# Patient Record
Sex: FEMALE | Race: WHITE
[De-identification: names, ages, dates, MRNs, and addresses within clinical notes are randomized per-mention and may not be internally consistent; named-entity substitution may affect disease eponyms.]

---

## 2017-06-06 NOTE — PCM.HP
H&P History of Present Illness





- General


Date of Service: 06/06/17


Source of Information: Provider


History Limitations: Reports: No Limitations





- History of Present Illness


Initial Comments - Free Text/Narative: 











84 year old SN[facility] resident apparently has had at least three episodes of N/V, can 

not exclude hematemesis.  No apparent fever or chills were noted;  appears to 

have associated with abdominal discomfort.  Information obtained using EMR, 

patient could not provide additional information.  There has been a lack of 

appetite.  A change in mental status with increasing lethargy.  Daughter of the 

patient requested minimal approach with treatment of UTI and dehydration for 

now.  Does not want a general surgery consult for now re: GI bleed.


Onset of Symptoms: Reports: Sudden


Symptom Onset Date: 06/06/17


Duration of Symptoms: Reports: Hour(s):, Getting Worse


Location: Reports: Abdomen


Severity: Moderate


Improves with: Reports: None


Worsens with: Reports: None


Associated Symptoms: Reports: Loss of Appetite, Nausea/Vomiting, Weakness





- Related Data


Allergies/Adverse Reactions: 


 Allergies











Allergy/AdvReac Type Severity Reaction Status Date / Time


 


hydrocodone Allergy  Vomiting Verified 04/25/14 08:33


 


meperidine [From Demerol] Allergy  Cannot Verified 06/06/17 15:11





   Remember  


 


tramadol Allergy  Dizziness Verified 04/25/14 08:33











Home Medications: 


 Home Meds





Acetaminophen [Tylenol Extra Strength] 500 mg PO Q4H PRN 04/25/14 [History]


Ascorbic Acid [Vitamin C] 500 mg PO DAILY 04/25/14 [History]


B2/Vit A,C & E/Lut/Zeaxanth/Mn [Icaps] 1 each PO DAILY 04/25/14 [History]


Cholecalciferol (Vitamin D3) [Vitamin D3] 1,000 units PO DAILY 04/25/14 [History

]


Cyanocobalamin (Vitamin B12) [Vitamin B12] 1,000 mcg PO DAILY 04/25/14 [History]


Ferrous Sulfate [Ferosul] 325 mg PO BID 04/25/14 [History]


Gabapentin 1 tab PO DAILY 04/25/14 [History]


Propranolol [Inderal] 10 mg PO BID 04/25/14 [History]


Citalopram Hydrobromide [Celexa] 20 mg PO BEDTIME 08/22/14 [History]


Acetaminophen [Tylenol] 650 mg PO BID 06/06/17 [History]


Memantine HCl [Namenda XR] 14 mg PO DAILY 06/06/17 [History]


Ondansetron [Zofran ODT] 4 mg PO Q4H PRN 06/06/17 [History]


Polyethylene Glycol 3350 [Miralax] 17 gram PO DAILY 06/06/17 [History]


Ranitidine HCl [Zantac] 150 mg PO DAILY 06/06/17 [History]


Sennosides/Docusate Sodium [Senna-Docusate Sodium] 8.6 - 50 mg PO BID 06/06/17 [

History]











Past Medical History


HEENT History: Reports: Macular Degeneration


Cardiovascular History: Reports: Hypertension


Gastrointestinal History: Reports: GERD


Genitourinary History: Reports: Chronic Renal Insuffiency


Psychiatric History: Reports: Anxiety, Dementia, Depression, Other (See Below)


Other Psychiatric History: no behavioral disturbances.


Hematologic History: Reports: Anemia





- Past Surgical History


GI Surgical History: Reports: Other (See Below)


Other GI Surgeries/Procedures: colitis





Social & Family History





- Tobacco Use


Smoking Status *Q: Never Smoker


Second Hand Smoke Exposure: No





- Alcohol Use


Days Per Week of Alcohol Use: 0





- Recreational Drug Use


Recreational Drug Use: No





- Living Situation & Occupation


Living situation: Reports: Extended Care Facility





H&P Review of Systems





- Review of Systems:


Review Of Systems: See Below


General: Reports: Weakness


HEENT: Reports: No Symptoms


Pulmonary: Reports: No Symptoms


Cardiovascular: Reports: Lightheadedness


Gastrointestinal: Reports: Abdominal Pain, Decreased Appetite, Nausea, Vomiting


Genitourinary: Reports: No Symptoms


Musculoskeletal: Reports: No Symptoms


Skin: Reports: No Symptoms


Psychiatric: Reports: No Symptoms


Neurological: Reports: No Symptoms


Hematologic/Lymphatic: Reports: No Symptoms


Immunologic: Reports: No Symptoms





Exam





- Exam


Exam: See Below





- Vital Signs


Vital Signs: 


 Last Vital Signs











Temp  36.1 C   06/06/17 15:03


 


Pulse  98   06/06/17 17:30


 


Resp  16   06/06/17 17:30


 


BP  137/78   06/06/17 17:30


 


Pulse Ox  97   06/06/17 17:30











Weight: 63.503 kg





- Exam


Quality Assessment: Supplemental Oxygen, DVT Prophylaxis


General: Sedated


HEENT: Nares Patent, Normal Nasal Septum, Pupils Equal, Pupils Reactive, PERRLA


Neck: Trachea Midline


Lungs: Normal Respiratory Effort, Decreased Breath Sounds


Cardiovascular: Regular Rate


Abdomen: Normal Bowel Sounds, Soft


 (Female) Exam: Deferred


Rectal (Female) Exam: Deferred


Back Exam: Normal Inspection


Extremities: Normal Pulses


Skin: Warm


Neurological: Cranial Nerves Intact


Neuro Extensive - Motor, Sensory, Reflexes: CN II-XII Intact





- Patient Data


Result Diagrams: 


 06/06/17 15:00





 06/06/17 15:00





*Q Meaningful Use (ADM)





- VTE *Q


VTE Criteria *Q: 








- Stroke *Q


Stroke Criteria *Q: 








- AMI *Q


AMI Criteria *Q: 








- Problem List


(1) Dehydration


SNOMED Code(s): 94248117


   ICD Code: E86.0 - DEHYDRATION   Status: Acute   Priority: High   Current 

Visit: Yes   





(2) UTI, Urinary tract infectious disease


SNOMED Code(s): 25882241


   ICD Code: N39.0 - URINARY TRACT INFECTION, SITE NOT SPECIFIED   Status: 

Acute   Priority: Medium   Current Visit: Yes   





(3) Hyperkalemia


SNOMED Code(s): 92025875


   ICD Code: E87.5 - HYPERKALEMIA   Status: Acute   Priority: High   Current 

Visit: No   





(4) Renal failure syndrome


SNOMED Code(s): 24747624


   ICD Code: N19 - UNSPECIFIED KIDNEY FAILURE   Status: Acute   Priority: High 

  Current Visit: No   





(5) Dementia


SNOMED Code(s): 49172031


   ICD Code: F03.90 - UNSPECIFIED DEMENTIA WITHOUT BEHAVIORAL DISTURBANCE   

Status: Chronic   Priority: Medium   Current Visit: No   Onset Date: 08/22/14   


Problem List Initiated/Reviewed/Updated: Yes


Orders Last 24hrs: 


 Active Orders 24 hr











 Category Date Time Status


 


 Antiembolic Devices [RC] PER UNIT ROUTINE Care  06/06/17 20:13 Ordered


 


 Aspiration Precautions [RC] ASDIRECTED Care  06/06/17 20:12 Ordered


 


 Bedrest [RC] ASDIRECTED Care  06/06/17 20:12 Ordered


 


 Vital Signs [RC] PER UNIT ROUTINE Care  06/06/17 20:11 Ordered


 


 NPO [Nothing Per Oral Diet] [DIET] Diet  06/06/17 Dinner Ordered


 


 CBC W/O DIFF,HEMOGRAM [HEME] MOTH@0700 Lab  06/08/17 07:00 Ordered


 


 CBC W/O DIFF,HEMOGRAM [HEME] MOTH@0700 Lab  06/12/17 07:00 Ordered


 


 CBC W/O DIFF,HEMOGRAM [HEME] MOTH@0700 Lab  06/15/17 07:00 Ordered


 


 CBC W/O DIFF,HEMOGRAM [HEME] MOTH@0700 Lab  06/19/17 07:00 Ordered


 


 CBC W/O DIFF,HEMOGRAM [HEME] MOTH@0700 Lab  06/22/17 07:00 Ordered


 


 CBC W/O DIFF,HEMOGRAM [HEME] MOTH@0700 Lab  06/26/17 07:00 Ordered


 


 Citalopram Hydrobromide [Celexa] Med  06/06/17 21:00 Ordered





 20 mg PO BEDTIME   


 


 Enoxaparin [Lovenox] Med  06/07/17 09:00 Ordered





 30 mg SUBCUT DAILY   


 


 Ferrous Sulfate Med  06/06/17 21:00 Ordered





 325 mg PO BID   


 


 Gabapentin [Neurontin] Med  06/07/17 09:00 Ordered





 1 tab PO DAILY   


 


 Memantine HCl Med  06/06/17 20:15 Ordered





 14 mg PO DAILY   


 


 Pantoprazole [ProTONIX IV***] Med  06/06/17 20:15 Ordered





 40 mg IVPUSH Q12H   


 


 Propranolol [Inderal] Med  06/06/17 21:00 Ordered





 10 mg PO BID   


 


 WENDY Hose [Antiembolic Hose] [OM.PC] Routine Oth  06/06/17 20:13 Ordered


 


 Code Status [Resuscitation Status] Routine Resus Stat  06/06/17 20:12 Ordered








 Medication Orders





Enoxaparin Sodium (Lovenox)  30 mg SUBCUT DAILY ANA


Ferrous Sulfate (Ferrous Sulfate)  325 mg PO BID ANA


Gabapentin (Neurontin)   mg PO DAILY ANA


Sodium Chloride (Normal Saline)  1,000 mls @ 100 mls/hr IV ONETIME ONE


   Stop: 06/07/17 01:13


   Last Admin: 06/06/17 15:45  Dose: 100 mls/hr


Non-Formulary Medication (Memantine Hcl)  14 mg PO DAILY ANA


Non-Formulary Medication (Citalopram Hydrobromide [Celexa])  20 mg PO BEDTIME 

ANA


Pantoprazole Sodium (Protonix Iv***)  40 mg IVPUSH Q12H ANA


Propranolol HCl (Inderal)  10 mg PO BID ANA


Sodium Chloride (Saline Flush)  10 ml FLUSH ASDIRECTED PRN


   PRN Reason: Keep Vein Open


   Last Admin: 06/06/17 15:00  Dose: 10 ml


Sodium Chloride (Saline Flush)  10 ml FLUSH ONETIME PRN


   PRN Reason: IV FLUSH








Assessment/Plan Comment:: 











Impression:





AMS with history of dementia





AUTI





Dehydration





CKD, stage III-IV





Abdominal pain with episodes of nausea, emesis is unknown,


presumed to be coffee ground based on history.

















Plan:





Continue IVF


Protonix


NGT if needed, will keep NPO except meds and ice chips


Declines Gen Surg eval at this time


Continue Rocephin


UC/BC are pending


SW/PT/OT as tolerated


DVT/GI prophylaxis


DNR/DNI





LOS expected <96 hours

## 2017-06-06 NOTE — EDM.PDOC
ED HPI GENERAL MEDICAL PROBLEM





- General


Chief Complaint: Abdominal Pain


Stated Complaint: DARK COLORED VOMIT


Time Seen by Provider: 06/06/17 15:00


Source of Information: Reports: Nursing Home Records


History Limitations: Reports: Other (patient has severe dementia and does not 

respond appropriately to questioning)





- History of Present Illness


INITIAL COMMENTS - FREE TEXT/NARRATIVE: 





84-year-old female presents for evaluation and treatment of possible 

hematemesis. History is provided by nursing home records as the patient has 

severe dementia and is unable to verbalize appropriately. Reports that she's 

had 3 episodes of dark brown/red vomiting today. Last bowel movement was 

yesterday. Reportedly this was a small bowel movement. She has refused all food 

and medications today. The question if she has some abdominal discomfort. No 

fevers.





Patient is a DNR/DNI.





- Related Data


 Allergies











Allergy/AdvReac Type Severity Reaction Status Date / Time


 


hydrocodone Allergy  Vomiting Verified 04/25/14 08:33


 


meperidine [From Demerol] Allergy  Cannot Verified 06/06/17 15:11





   Remember  


 


tramadol Allergy  Dizziness Verified 04/25/14 08:33











Home Meds: 


 Home Meds





Acetaminophen [Tylenol Extra Strength] 500 mg PO Q4H PRN 04/25/14 [History]


Ascorbic Acid [Vitamin C] 500 mg PO DAILY 04/25/14 [History]


B2/Vit A,C & E/Lut/Zeaxanth/Mn [Icaps] 1 each PO DAILY 04/25/14 [History]


Cholecalciferol (Vitamin D3) [Vitamin D3] 1,000 units PO DAILY 04/25/14 [History

]


Cyanocobalamin (Vitamin B12) [Vitamin B12] 1,000 mcg PO DAILY 04/25/14 [History]


Ferrous Sulfate [Ferosul] 325 mg PO BID 04/25/14 [History]


Gabapentin 1 tab PO DAILY 04/25/14 [History]


Propranolol [Inderal] 10 mg PO BID 04/25/14 [History]


Citalopram Hydrobromide [Celexa] 20 mg PO BEDTIME 08/22/14 [History]


Acetaminophen [Tylenol] 650 mg PO BID 06/06/17 [History]


Memantine HCl [Namenda XR] 14 mg PO DAILY 06/06/17 [History]


Ondansetron [Zofran ODT] 4 mg PO Q4H PRN 06/06/17 [History]


Polyethylene Glycol 3350 [Miralax] 17 gram PO DAILY 06/06/17 [History]


Ranitidine HCl [Zantac] 150 mg PO DAILY 06/06/17 [History]


Sennosides/Docusate Sodium [Senna-Docusate Sodium] 8.6 - 50 mg PO BID 06/06/17 [

History]











Social & Family History





- Tobacco Use


Smoking Status *Q: Never Smoker


Second Hand Smoke Exposure: No





- Alcohol Use


Days Per Week of Alcohol Use: 0





- Recreational Drug Use


Recreational Drug Use: No





- Living Situation & Occupation


Living situation: Reports: Extended Care Facility





ED ROS GENERAL





- Review of Systems


Review Of Systems: See Below


Constitutional: Denies: Fever


GI/Abdominal: Reports: Hematemesis (reports annmarie/brown emesis x 3 per nursing 

home), Nausea, Vomiting (x3 today).  Denies: Diarrhea





ED EXAM, GI/ABD





- Physical Exam


Exam: See Below


Exam Limited By: Other (patient has severe dementia)


General Appearance: Alert, Thin


Throat/Mouth: Normal Inspection, Normal Voice, No Airway Compromise


Respiratory/Chest: No Respiratory Distress, Lungs Clear, Normal Breath Sounds


Cardiovascular: Normal Peripheral Pulses, Regular Rate, Rhythm, No Edema, No 

Murmur


GI/Abdominal: Normal Bowel Sounds, Soft, Tenderness (minor tenderness to 

palpitation and then by the patient grimacing to the left lower quadrant.).  No

: Distention


Neurological: Alert


Psychiatric: Normal Affect, Normal Mood


Skin Exam: Warm, Dry, Normal Color





Course





- Vital Signs


Last Recorded V/S: 


 Last Vital Signs











Temp  36.1 C   06/06/17 15:03


 


Pulse  98   06/06/17 17:30


 


Resp  16   06/06/17 17:30


 


BP  137/78   06/06/17 17:30


 


Pulse Ox  97   06/06/17 17:30














- Orders/Labs/Meds


Orders: 


 Active Orders 24 hr











 Category Date Time Status


 


 Cardiac Monitoring [RC] .AS DIRECTED Care  06/06/17 15:17 Active


 


 Insert Marroquin Catheter [Insert Urinary Catheter] [OM.PC] Care  06/06/17 16:05 

Ordered





 Q24H   


 


 Peripheral IV Care [RC] .AS DIRECTED Care  06/06/17 15:13 Active


 


 Urinary Catheter Assessment [RC] ASDIRECTED Care  06/06/17 16:22 Active


 


 Abdomen 2V AP Flat Upright [CR] Stat Exams  06/06/17 15:15 Taken


 


 Chest 1V Frontal [CR] Stat Exams  06/06/17 15:17 Taken


 


 CULTURE BLOOD [BC] Stat Lab  06/06/17 16:55 Ordered


 


 CULTURE URINE [RM] Stat Lab  06/06/17 16:53 Ordered


 


 Sodium Chloride 0.9% [Normal Saline] 1,000 ml Med  06/06/17 15:14 Active





 IV ONETIME   


 


 Sodium Chloride 0.9% [Saline Flush] Med  06/06/17 15:13 Active





 10 ml FLUSH ASDIRECTED PRN   


 


 Sodium Chloride 0.9% [Saline Flush] Med  06/06/17 16:30 Active





 10 ml FLUSH ONETIME PRN   


 


 Blood Culture x2 Reflex Set [OM.PC] Stat Oth  06/06/17 16:55 Ordered


 


 Peripheral IV Insertion Adult [OM.PC] Routine Oth  06/06/17 15:12 Ordered








 Medication Orders





Sodium Chloride (Normal Saline)  1,000 mls @ 100 mls/hr IV ONETIME ONE


   Stop: 06/07/17 01:13


   Last Admin: 06/06/17 15:45  Dose: 100 mls/hr


Sodium Chloride (Saline Flush)  10 ml FLUSH ASDIRECTED PRN


   PRN Reason: Keep Vein Open


   Last Admin: 06/06/17 15:00  Dose: 10 ml


Sodium Chloride (Saline Flush)  10 ml FLUSH ONETIME PRN


   PRN Reason: IV FLUSH








Labs: 


 Laboratory Tests











  06/06/17 06/06/17 06/06/17 Range/Units





  15:00 15:00 15:00 


 


WBC   13.96 H   (3.98-10.04)  K/mm3


 


RBC   3.94 L   (3.98-5.22)  M/mm3


 


Hgb   12.0   (11.2-15.7)  gm/L


 


Hct   35.6   (34.1-44.9)  %


 


MCV   90.4   (79.4-94.8)  fl


 


MCH   30.5   (25.6-32.2)  pg


 


MCHC   33.7   (32.2-35.5)  g/dl


 


RDW Std Deviation   41.5   (36.4-46.3)  fL


 


Plt Count   327   (182-369)  K/mm3


 


MPV   10.7   (9.4-12.3)  fl


 


Neut % (Auto)   69.4   (34.0-71.1)  %


 


Lymph % (Auto)   22.8   (19.3-51.7)  %


 


Mono % (Auto)   7.2   (4.7-12.5)  %


 


Eos % (Auto)   0.3 L   (0.7-5.8)  


 


Baso % (Auto)   0.1   (0.1-1.2)  %


 


Neut # (Auto)   9.69 H   (1.56-6.13)  K/mm3


 


Lymph # (Auto)   3.18   (1.18-3.74)  K/mm3


 


Mono # (Auto)   1.00 H   (0.24-0.36)  K/mm3


 


Eos # (Auto)   0.04   (0.04-0.36)  K/mm3


 


Baso # (Auto)   0.02   (0.01-0.08)  K/mm3


 


Sodium  142    (136-145)  mEq/L


 


Potassium  4.3    (3.5-5.1)  mEq/L


 


Chloride  104    ()  mEq/L


 


Carbon Dioxide  22    (21-32)  mEq/L


 


Anion Gap  20.3 H    (5-15)  


 


BUN  23 H    (7-18)  mg/dL


 


Creatinine  1.6 H    (0.55-1.02)  mg/dL


 


Est Cr Clr Drug Dosing  21.65    mL/min


 


Estimated GFR (MDRD)  31    (>60)  mL/min


 


BUN/Creatinine Ratio  14.4    (14-18)  


 


Glucose  147 H    ()  mg/dL


 


Lactic Acid     (0.4-2.0)  mmol/L


 


Calcium  9.7    (8.5-10.1)  mg/dL


 


Total Bilirubin  1.0    (0.2-1.0)  mg/dL


 


AST  20    (15-37)  U/L


 


ALT  36    (14-59)  U/L


 


Alkaline Phosphatase  119 H    ()  U/L


 


C-Reactive Protein  2.2 H*    (<1.0)  mg/dL


 


B-Natriuretic Peptide    181 H  (0-100)  pg/mL


 


Total Protein  7.7    (6.4-8.2)  g/dl


 


Albumin  4.1    (3.4-5.0)  g/dl


 


Globulin  3.6    gm/dL


 


Albumin/Globulin Ratio  1.1    (1-2)  


 


Urine Color     (Yellow)  


 


Urine Appearance     (Clear)  


 


Urine pH     (5.0-8.0)  


 


Ur Specific Gravity     (1.005-1.030)  


 


Urine Protein     (Negative)  


 


Urine Glucose (UA)     (Negative)  


 


Urine Ketones     (Negative)  


 


Urine Occult Blood     (Negative)  


 


Urine Nitrite     (Negative)  


 


Urine Bilirubin     (Negative)  


 


Urine Urobilinogen     (0.2-1.0)  


 


Ur Leukocyte Esterase     (Negative)  


 


Urine RBC     (0-5)  /hpf


 


Urine WBC     (0-5)  /hpf


 


Ur Epithelial Cells     


 


Ur Squamous Epith Cells     (0-5)  /hpf


 


Urine Bacteria     (FEW)  /hpf


 


Urine Mucus     (FEW)  /hpf


 


Ketones     (0.0-0.3)  mM














  06/06/17 06/06/17 06/06/17 Range/Units





  15:00 16:10 16:43 


 


WBC     (3.98-10.04)  K/mm3


 


RBC     (3.98-5.22)  M/mm3


 


Hgb     (11.2-15.7)  gm/L


 


Hct     (34.1-44.9)  %


 


MCV     (79.4-94.8)  fl


 


MCH     (25.6-32.2)  pg


 


MCHC     (32.2-35.5)  g/dl


 


RDW Std Deviation     (36.4-46.3)  fL


 


Plt Count     (182-369)  K/mm3


 


MPV     (9.4-12.3)  fl


 


Neut % (Auto)     (34.0-71.1)  %


 


Lymph % (Auto)     (19.3-51.7)  %


 


Mono % (Auto)     (4.7-12.5)  %


 


Eos % (Auto)     (0.7-5.8)  


 


Baso % (Auto)     (0.1-1.2)  %


 


Neut # (Auto)     (1.56-6.13)  K/mm3


 


Lymph # (Auto)     (1.18-3.74)  K/mm3


 


Mono # (Auto)     (0.24-0.36)  K/mm3


 


Eos # (Auto)     (0.04-0.36)  K/mm3


 


Baso # (Auto)     (0.01-0.08)  K/mm3


 


Sodium     (136-145)  mEq/L


 


Potassium     (3.5-5.1)  mEq/L


 


Chloride     ()  mEq/L


 


Carbon Dioxide     (21-32)  mEq/L


 


Anion Gap     (5-15)  


 


BUN     (7-18)  mg/dL


 


Creatinine     (0.55-1.02)  mg/dL


 


Est Cr Clr Drug Dosing     mL/min


 


Estimated GFR (MDRD)     (>60)  mL/min


 


BUN/Creatinine Ratio     (14-18)  


 


Glucose     ()  mg/dL


 


Lactic Acid    2.4 H  (0.4-2.0)  mmol/L


 


Calcium     (8.5-10.1)  mg/dL


 


Total Bilirubin     (0.2-1.0)  mg/dL


 


AST     (15-37)  U/L


 


ALT     (14-59)  U/L


 


Alkaline Phosphatase     ()  U/L


 


C-Reactive Protein     (<1.0)  mg/dL


 


B-Natriuretic Peptide     (0-100)  pg/mL


 


Total Protein     (6.4-8.2)  g/dl


 


Albumin     (3.4-5.0)  g/dl


 


Globulin     gm/dL


 


Albumin/Globulin Ratio     (1-2)  


 


Urine Color   Yellow   (Yellow)  


 


Urine Appearance   Cloudy H   (Clear)  


 


Urine pH   7.5   (5.0-8.0)  


 


Ur Specific Gravity   1.020   (1.005-1.030)  


 


Urine Protein   2+ H   (Negative)  


 


Urine Glucose (UA)   Negative   (Negative)  


 


Urine Ketones   Negative   (Negative)  


 


Urine Occult Blood   Trace-lysed H   (Negative)  


 


Urine Nitrite   Positive H   (Negative)  


 


Urine Bilirubin   Negative   (Negative)  


 


Urine Urobilinogen   1.0   (0.2-1.0)  


 


Ur Leukocyte Esterase   1+ H   (Negative)  


 


Urine RBC   0-5   (0-5)  /hpf


 


Urine WBC   30-40 H   (0-5)  /hpf


 


Ur Epithelial Cells   Not Reportable   


 


Ur Squamous Epith Cells   0-5   (0-5)  /hpf


 


Urine Bacteria   Many H   (FEW)  /hpf


 


Urine Mucus   Few   (FEW)  /hpf


 


Ketones  0.25    (0.0-0.3)  mM











Meds: 


Medications











Generic Name Dose Route Start Last Admin





  Trade Name Freq  PRN Reason Stop Dose Admin


 


Sodium Chloride  1,000 mls @ 100 mls/hr  06/06/17 15:14  06/06/17 15:45





  Normal Saline  IV  06/07/17 01:13  100 mls/hr





  ONETIME ONE   Administration


 


Sodium Chloride  10 ml  06/06/17 15:13  06/06/17 15:00





  Saline Flush  FLUSH   10 ml





  ASDIRECTED PRN   Administration





  Keep Vein Open   


 


Sodium Chloride  10 ml  06/06/17 16:30  





  Saline Flush  FLUSH   





  ONETIME PRN   





  IV FLUSH   














Discontinued Medications














Generic Name Dose Route Start Last Admin





  Trade Name Lindsay  PRN Reason Stop Dose Admin


 


Ceftriaxone Sodium 1 gm/  100 mls @ 200 mls/hr  06/06/17 17:11  06/06/17 17:39





  Sodium Chloride  IV  06/06/17 17:40  200 mls/hr





  ONETIME ONE   Administration


 


Iopamidol  100 ml  06/06/17 16:30  





  Isovue-300 (61%)  IVPUSH  06/06/17 16:31  





  ONETIME ONE   


 


Lorazepam  0.5 mg  06/06/17 15:22  06/06/17 15:47





  Ativan  IVPUSH  06/06/17 15:23  0.5 mg





  ONETIME ONE   Administration


 


Lorazepam  0.5 mg  06/06/17 16:28  06/06/17 16:36





  Ativan  IVPUSH  06/06/17 16:29  0.5 mg





  ONETIME ONE   Administration


 


Ondansetron HCl  4 mg  06/06/17 15:15  06/06/17 15:55





  Zofran  IVPUSH  06/06/17 15:16  4 mg





  ONETIME ONE   Administration


 


Pantoprazole Sodium  40 mg  06/06/17 15:15  06/06/17 15:50





  Protonix Iv***  IVPUSH  06/06/17 15:16  40 mg





  ONETIME ONE   Administration














- Re-Assessments/Exams


Free Text/Narrative Re-Assessment/Exam: 





06/06/17 16:20


I spoke with the patient's daughter, Penny at [phone], regarding the 

patient. At this point it is not clear the exact etiology of her emesis this 

morning or if there was indeed blood. 





I reviewed the lab results.


Labs:


WBC elevated at 13.96, hgb is 12.0 and plts are 327


CRP is 2.2


lactic acid is 2.4


BNP is elevated at 181





We're waiting delay.





Discussed further intervention. We could CT her abdomen and pelvis. I am unsure 

how she will handle this. She has receive 0.5 mg IV Ativan thus far which has 

significantly calmed her. We could put an NG tube and small amount of saline 

and confirmed if this was blood. She may require an EGD for further 

intervention. The daughter does not want her under any sedation. She would like 

us to proceed with the CT but does not want an NG tube or an EGD at this time. 

I will call her with an update we receive more information.





06/06/17 18:01


UA has 2+ protein , trace lysed blood, + itrites, 1+ leuks and many bacteria. 





I reviewed the CT results with the daughter. Her urine has returned and suggest 

infections. Urine culture sent. Lab was only able to obtain one blood culture. 

I feel that she has a urinary tract infection which was likely causing the 

vomiting. She has not vomited since entering the ER and therefore I am unable 

to confirm if she was vomiting blood earlier. Her daughter would like us to 

admit for a urinary tract infection and treat with fluids and antibiotics at 

this time.





I spoke with Dr. Spicer regarding the patient, agrees to admission. Will admit 

to med/surg for dehydration and UTI. 





Departure





- Departure


Time of Disposition: 18:56


Disposition: Admitted As Inpatient 66


Condition: poor


Clinical Impression: 


 UTI, Urinary tract infectious disease, Dehydration








- Discharge Information


Forms:  ED Department Discharge


Additional Instructions: 


Patient admitted to med/surg for a UTI and dehydration. Dr. Spicer accepting. 





- My Orders


Last 24 Hours: 


My Active Orders





06/06/17 15:12


Peripheral IV Insertion Adult [OM.PC] Routine 





06/06/17 15:13


Peripheral IV Care [RC] .AS DIRECTED 


Sodium Chloride 0.9% [Saline Flush]   10 ml FLUSH ASDIRECTED PRN 





06/06/17 15:14


Sodium Chloride 0.9% [Normal Saline] 1,000 ml IV ONETIME 





06/06/17 15:15


Abdomen 2V AP Flat Upright [CR] Stat 





06/06/17 15:17


Cardiac Monitoring [RC] .AS DIRECTED 


Chest 1V Frontal [CR] Stat 





06/06/17 16:05


Insert Marroquin Catheter [Insert Urinary Catheter] [OM.PC] Q24H 





06/06/17 16:22


Urinary Catheter Assessment [RC] ASDIRECTED 





06/06/17 16:30


Sodium Chloride 0.9% [Saline Flush]   10 ml FLUSH ONETIME PRN 





06/06/17 16:53


CULTURE URINE [RM] Stat 





06/06/17 16:55


CULTURE BLOOD [BC] Stat 


Blood Culture x2 Reflex Set [OM.PC] Stat 














- Assessment/Plan


Last 24 Hours: 


My Active Orders





06/06/17 15:12


Peripheral IV Insertion Adult [OM.PC] Routine 





06/06/17 15:13


Peripheral IV Care [RC] .AS DIRECTED 


Sodium Chloride 0.9% [Saline Flush]   10 ml FLUSH ASDIRECTED PRN 





06/06/17 15:14


Sodium Chloride 0.9% [Normal Saline] 1,000 ml IV ONETIME 





06/06/17 15:15


Abdomen 2V AP Flat Upright [CR] Stat 





06/06/17 15:17


Cardiac Monitoring [RC] .AS DIRECTED 


Chest 1V Frontal [CR] Stat 





06/06/17 16:05


Insert Marroquin Catheter [Insert Urinary Catheter] [OM.PC] Q24H 





06/06/17 16:22


Urinary Catheter Assessment [RC] ASDIRECTED 





06/06/17 16:30


Sodium Chloride 0.9% [Saline Flush]   10 ml FLUSH ONETIME PRN 





06/06/17 16:53


CULTURE URINE [RM] Stat 





06/06/17 16:55


CULTURE BLOOD [BC] Stat 


Blood Culture x2 Reflex Set [OM.PC] Stat

## 2017-06-06 NOTE — CT
CT abdomen and pelvis

 

Technique: Multiple axial sections were obtained from above the dome 

of the diaphragm inferiorly through the pubic symphysis.  Intravenous 

contrast was utilized.  Delayed images were also obtained through the 

abdomen and pelvis.  No oral contrast has been given.

 

Findings: Moderately large hiatal hernia is seen.  Slight interstitial

 fibrosis is noted within the left lung base.

 

Cyst identified within the left lobe of the liver measuring 

approximately 3.7 cm.  Several very minimal low density areas are seen

 within the right lobe believed to represent very minimal cysts.  

 

Spleen is normal.  Adrenal glands show no nodule.  

 

Cyst identified within the left kidney measuring 7.6 cm.  Several 

smaller cysts are also seen within both kidneys.  Small fatty lesion 

is noted within the left kidney measuring about 5 mm compatible with 

angiomyolipoma which is incidental.  Delayed images shows contrast 

within the bladder as well as within nondilated ureters.  

 

Pancreas is within normal limits but atrophic.  Aorta shows 

atherosclerotic change which continues into the iliac vessels with no 

aneurysm.  Gallbladder shows no calcified gallstones.  No 

retroperitoneal adenopathy or mesenteric abnormalities are seen.  

 

No pelvic mass or adenopathy is seen.  Appendix not visualized with 

certainty.  No inflammatory change or free fluid is seen.

 

Bone window settings were reviewed which appear within normal limits 

for the patient's age.

 

Impression:

1.  Cyst within the liver.  Cysts within both kidneys.  Small and 

incidental angiomyolipoma within the left kidney.

2.  Moderately large hiatal hernia.

3.  Other incidental findings.  Nothing acute is appreciated on CT 

study of the abdomen and pelvis.

 

Diagnostic code #2

## 2017-06-07 NOTE — CR
Chest: Portable view of the chest was obtained.

 

Comparison: Previous chest x-ray of 08/26/14.

 

Heart size is slightly prominent with left ventricular configuration. 

 Mild tortuosity of the thoracic aorta is seen.  Lungs are clear.  

Mild scoliosis is present within the spine.  Bony structures are 

osteopenic.

 

Impression:

1.  Incidental findings as noted above.  Nothing acute is appreciated 

on portable chest x-ray.

 

Diagnostic code #2

## 2017-06-07 NOTE — PCM.PN
- General Info


Date of Service: 06/07/17


Subjective Update: 


Follow Up





Functional Status: Reports: pain controlled, urinating.  Denies: ambulating, 

new symptoms





- Review of Systems


General: Denies: Fever, Chills


HEENT: Denies: contact lenses


Pulmonary: Denies: shortness of breath, pleuritic chest pain


Cardiovascular: Denies: No Symptoms, Chest Pain, Palpitations


Gastrointestinal: Reports: Difficulty swallowing (has underlying dysphagia).  

Denies: Abdominal pain, Nausea, Vomiting


Genitourinary: Reports: no symptoms


Musculoskeletal: Reports: no symptoms


Skin: Denies: cyanosis, diaphoresis, bruising, pruritis


Neurological: Reports: Confusion, Pre-Existing Deficit.  Denies: Difficulty 

Walking, Weakness, Gait Disturbance


Psychiatric: Denies: depression, anxiety, agitation


Systems Review Comment:: 


No overnight or acute issues. Her UA cx shows a GNR organism. She has no acute 

issues. She is hypotensive this am with a BP of 85/46 mmHg. She is afebrile.








- Patient Data


Vitals - most recent: 


 Last Vital Signs











Temp  36.7 C   06/07/17 08:26


 


Pulse  59 L  06/07/17 08:26


 


Resp  14   06/07/17 08:26


 


BP  85/46 L  06/07/17 08:26


 


Pulse Ox  100   06/07/17 08:26











Weight - most recent: 47.763 kg


I&O - last 24 hours: 


 Intake & Output











 06/06/17 06/07/17 06/07/17





 22:59 06:59 14:59


 


Intake Total  1122 


 


Balance  1122 











Lab Results last 24 hrs: 


 Laboratory Results - last 24 hr











  06/06/17 06/07/17 Range/Units





  22:45 05:57 


 


Sodium   142  (136-145)  mEq/L


 


Potassium   3.8  (3.5-5.1)  mEq/L


 


Chloride   109 H  ()  mEq/L


 


Carbon Dioxide   22  (21-32)  mEq/L


 


Anion Gap   14.8  (5-15)  


 


BUN   20 H  (7-18)  mg/dL


 


Creatinine   1.3 H  (0.55-1.02)  mg/dL


 


Est Cr Clr Drug Dosing   24.29  mL/min


 


Estimated GFR (MDRD)   39  (>60)  mL/min


 


BUN/Creatinine Ratio   15.4  (14-18)  


 


Glucose   102  ()  mg/dL


 


Calcium   8.4 L  (8.5-10.1)  mg/dL


 


Magnesium   1.9  (1.8-2.4)  mg/dl


 


Troponin I   < 0.017  (0.00-0.056)  ng/mL


 


C-Reactive Protein   1.8 H*  (<1.0)  mg/dL


 


Triglycerides   145  (<150)  mg/dL


 


Cholesterol   129  (<200)  mg/dL


 


LDL Cholesterol Direct   68  (<100)  mg/dL


 


HDL Cholesterol   44.0  (40-59)  mg/dL


 


TSH 3rd Generation   1.199  (0.358-3.74)  uIU/mL


 


MRSA (PCR)  Negative   











Med Orders - Current: 


 Current Medications





Acetaminophen (Tylenol Solution)  650 mg PO Q6H PRN


   PRN Reason: Fever


Citalopram Hydrobromide (Celexa)  20 mg PO BEDTIME [facility]


   Last Admin: 06/06/17 22:28 Dose:  Not Given


Enoxaparin Sodium (Lovenox)  30 mg SUBCUT DAILY [facility]


Ferrous Sulfate (Ferrous Sulfate)  325 mg PO BID [facility]


   Last Admin: 06/06/17 22:28 Dose:  Not Given


Gabapentin (Neurontin)  600 mg PO DAILY [facility]


Ceftriaxone Sodium 1 gm/ (Sodium Chloride)  100 mls @ 200 mls/hr IV Q24H [facility]


Promethazine HCl 12.5 mg/ (Sodium Chloride)  50.5 mls @ 100 mls/hr IV Q6H PRN


   PRN Reason: Nausea/Vomiting


Sodium Chloride (Normal Saline)  1,000 mls @ 75 mls/hr IV ASDIRECTED [facility]


   Last Admin: 06/07/17 02:38 Dose:  75 mls/hr


Memantine Extended (Release 14mg)  14 mg PO DAILY [facility]


   Last Admin: 06/06/17 22:27 Dose:  Not Given


Ondansetron HCl (Zofran)  4 mg IVPUSH Q8H PRN


   PRN Reason: Nausea/Vomiting


Pantoprazole Sodium (Protonix Iv***)  40 mg IVPUSH Q12H [facility]


   Last Admin: 06/06/17 21:00 Dose:  40 mg


Propranolol HCl (Inderal)  10 mg PO BID [facility]


   Last Admin: 06/06/17 22:28 Dose:  Not Given


Sodium Chloride (Saline Flush)  10 ml FLUSH ASDIRECTED PRN


   PRN Reason: Keep Vein Open


   Last Admin: 06/06/17 15:00 Dose:  10 ml





Discontinued Medications





Sodium Chloride (Normal Saline)  1,000 mls @ 100 mls/hr IV ONETIME ONE


   Stop: 06/07/17 01:13


   Last Admin: 06/06/17 15:45 Dose:  100 mls/hr


Ceftriaxone Sodium 1 gm/ (Sodium Chloride)  100 mls @ 200 mls/hr IV ONETIME ONE


   Stop: 06/06/17 17:40


   Last Admin: 06/06/17 17:39 Dose:  200 mls/hr


Sodium Chloride (Normal Saline)  800 mls @ 100 mls/hr IV ASDIRECTED [facility]


   Stop: 06/06/17 23:13


Iopamidol (Isovue-300 (61%))  100 ml IVPUSH ONETIME ONE


   Stop: 06/06/17 16:31


   Last Admin: 06/06/17 20:19 Dose:  Not Given


Lorazepam (Ativan)  0.5 mg IVPUSH ONETIME ONE


   Stop: 06/06/17 15:23


   Last Admin: 06/06/17 15:47 Dose:  0.5 mg


Lorazepam (Ativan)  0.5 mg IVPUSH ONETIME ONE


   Stop: 06/06/17 16:29


   Last Admin: 06/06/17 16:36 Dose:  0.5 mg


Ondansetron HCl (Zofran)  4 mg IVPUSH ONETIME ONE


   Stop: 06/06/17 15:16


   Last Admin: 06/06/17 15:55 Dose:  4 mg


Pantoprazole Sodium (Protonix Iv***)  40 mg IVPUSH ONETIME ONE


   Stop: 06/06/17 15:16


   Last Admin: 06/06/17 15:50 Dose:  40 mg


Sodium Chloride (Saline Flush)  10 ml FLUSH ONETIME PRN


   PRN Reason: IV FLUSH











- Exam


Quality Assessment: No: supplemental oxygen


General: alert, no acute distress


HEENT: Pupils equal, Pupils reactive


Neck: supple, trachea midline, no JVD, no thyromegaly


Lungs: Clear to auscultation, Normal respiratory effort, Decreased breath sounds


Cardiovascular: Regular Rate


Abdomen: bowel sounds present, soft, no tenderness, no distension


 (Female) Exam: Deferred


Back Exam: Normal Inspection, Decreased Range of Motion


Extremities: no edema, normal pulses, no tenderness/swelling, no clubbing, no 

cyanosis, no calf tenderness


Peripheral Pulses: 2+: Dorsalis Pedis (L), Dorsalis Pedis (R)


Skin: warm, dry, intact


Neurological: no new focal deficit


Psy/Mental Status: alert, normal affect, normal mood





- Problem List Review


Problem List Initiated/Reviewed/Updated: Yes





- Plan


Plan:: 


Assessment/Plan:


Acute: 


AMS with history of Severe Dementia


   - She seems to be at baseline





AUTI


   - Risk factor: Urinary Incontinence


   - UA cx pos for GNR


   - She is responding to treatment 


   - CRP is 1.7 (2.2)


   - Continue current treatment with IV Rocephin





Moderately Large Hiatal Hernia


   - She is currently on IV Protonix BID


   - She was on H2B for home maintenance, will resume





Hypotension


   - 85/46 mmHg this am 


   - She appears clinically stable





CT scan Abdominal/Pelvic Findings


   - Cysts in Liver and Kidneys


 


Resolved:


S/p Dehydration


S/p Coffee Ground Emesis with Gastritis/GERD as risk factors, family refused 

any further work up to include possible EGD





Chronic:


HTN


CKD, Stage III


Anxiety and Depression


MAGGIE


Hx/o Gastritis


Severe Dementia 


Urinary Incontinence


Non-Verbal


Hx/o Gastritis/GERD


Vit D Deficiency


Constipation


Dysphagia 3





Plan:


She appears clinically stable and comfortable


SLP eval for swallowing


Resume po meal after above eval


Family declines Gen Surg eval at this time


Continue current treatment 


Repeat manual BP, if remains low consider volume resuscitative measures


BB with parameters


PT/OT as tolerated


DVT/GI prophylaxis


Code status: DNR/DNI





D/c pending identification of UA culture and sensitivity

## 2017-06-07 NOTE — CR
Abdomen: Supine and decubitus views of the abdomen were obtained.

 

Comparison: Previous abdominal x-ray of 08/24/14.

 

Bowel gas pattern is normal.  Mild compression deformity is noted 

within L4 which is an interval change from prior abdominal x-ray.  

Calcifications within the pelvis are likely due to phleboliths.  Bony 

structures are osteoporotic.

 

Impression:

1.  Mild compression deformity of L4 which is an interval change from 

prior abdominal x-ray.

2.  Osteopenia.  Normal-appearing bowel gas pattern with no free air.

 

Diagnostic code #3

## 2017-06-08 NOTE — PCM.PN
- General Info


Date of Service: 06/08/17


Subjective Update: 


Follow Up





Functional Status: Reports: pain controlled, urinating.  Denies: tolerating diet

, ambulating, new symptoms





- Review of Systems


General: Denies: Fever, Chills


HEENT: Reports: no symptoms


Pulmonary: Denies: shortness of breath


Cardiovascular: Denies: Chest Pain


Gastrointestinal: Denies: Abdominal pain, Nausea, Vomiting


Genitourinary: Reports: no symptoms


Musculoskeletal: Reports: no symptoms


Skin: Reports: no symptoms


Neurological: Reports: Confusion, Difficulty Walking, Weakness, Gait Disturbance


Psychiatric: Denies: depression, anxiety, agitation, hallucinations


Systems Review Comment:: 


No overnight or acute issues. She is refusing all her pills, and SLP eval. She 

carries a severe form of dementia. She is afebrile w/o leukocytosis. Her UA 

shows E. coli sensitive to Rocephin.








- Patient Data


Vitals - most recent: 


 Last Vital Signs











Temp  36.8 C   06/08/17 02:05


 


Pulse  66   06/08/17 02:05


 


Resp  18   06/08/17 02:05


 


BP  121/53 L  06/08/17 02:05


 


Pulse Ox  97   06/08/17 02:05











Weight - most recent: 47.4 kg


I&O - last 24 hours: 


 Intake & Output











 06/07/17 06/07/17 06/08/17





 14:59 22:59 06:59


 


Intake Total  1000 966


 


Balance  1000 966











Lab Results last 24 hrs: 


 Laboratory Results - last 24 hr











  06/07/17 Range/Units





  05:57 


 


Sodium  142  (136-145)  mEq/L


 


Potassium  3.8  (3.5-5.1)  mEq/L


 


Chloride  109 H  ()  mEq/L


 


Carbon Dioxide  22  (21-32)  mEq/L


 


Anion Gap  14.8  (5-15)  


 


BUN  20 H  (7-18)  mg/dL


 


Creatinine  1.3 H  (0.55-1.02)  mg/dL


 


Est Cr Clr Drug Dosing  24.29  mL/min


 


Estimated GFR (MDRD)  39  (>60)  mL/min


 


BUN/Creatinine Ratio  15.4  (14-18)  


 


Glucose  102  ()  mg/dL


 


Calcium  8.4 L  (8.5-10.1)  mg/dL


 


Magnesium  1.9  (1.8-2.4)  mg/dl


 


Troponin I  < 0.017  (0.00-0.056)  ng/mL


 


C-Reactive Protein  1.8 H*  (<1.0)  mg/dL


 


Triglycerides  145  (<150)  mg/dL


 


Cholesterol  129  (<200)  mg/dL


 


LDL Cholesterol Direct  68  (<100)  mg/dL


 


HDL Cholesterol  44.0  (40-59)  mg/dL


 


TSH 3rd Generation  1.199  (0.358-3.74)  uIU/mL











Med Orders - Current: 


 Current Medications





Acetaminophen (Tylenol Solution)  650 mg PO Q6H PRN


   PRN Reason: Fever


Citalopram Hydrobromide (Celexa)  20 mg PO BEDTIME [facility]


   Last Admin: 06/07/17 20:40 Dose:  Not Given


Enoxaparin Sodium (Lovenox)  30 mg SUBCUT DAILY [facility]


   Last Admin: 06/07/17 10:01 Dose:  30 mg


Famotidine (Pepcid)  20 mg PO DAILY [facility]


Ferrous Sulfate (Ferrous Sulfate)  325 mg PO BID [facility]


   Last Admin: 06/07/17 20:40 Dose:  Not Given


Gabapentin (Neurontin)  600 mg PO DAILY [facility]


   Last Admin: 06/07/17 10:33 Dose:  Not Given


Ceftriaxone Sodium 1 gm/ (Sodium Chloride)  100 mls @ 200 mls/hr IV Q24H [facility]


   Last Admin: 06/07/17 15:25 Dose:  200 mls/hr


Promethazine HCl 12.5 mg/ (Sodium Chloride)  50.5 mls @ 100 mls/hr IV Q6H PRN


   PRN Reason: Nausea/Vomiting


Sodium Chloride (Normal Saline)  1,000 mls @ 75 mls/hr IV ASDIRECTED [facility]


   Last Admin: 06/08/17 04:33 Dose:  75 mls/hr


Memantine Extended (Release 14mg)  14 mg PO DAILY [facility]


   Last Admin: 06/06/17 22:27 Dose:  Not Given


Ondansetron HCl (Zofran)  4 mg IVPUSH Q8H PRN


   PRN Reason: Nausea/Vomiting


Pantoprazole Sodium (Protonix Iv***)  40 mg IVPUSH Q12H [facility]


   Last Admin: 06/07/17 20:31 Dose:  40 mg


Propranolol HCl (Inderal)  10 mg PO BID [facility]


   Last Admin: 06/07/17 20:40 Dose:  Not Given


Sodium Chloride (Saline Flush)  10 ml FLUSH ASDIRECTED PRN


   PRN Reason: Keep Vein Open


   Last Admin: 06/06/17 15:00 Dose:  10 ml





Discontinued Medications





Sodium Chloride (Normal Saline)  1,000 mls @ 100 mls/hr IV ONETIME ONE


   Stop: 06/07/17 01:13


   Last Admin: 06/06/17 15:45 Dose:  100 mls/hr


Ceftriaxone Sodium 1 gm/ (Sodium Chloride)  100 mls @ 200 mls/hr IV ONETIME ONE


   Stop: 06/06/17 17:40


   Last Admin: 06/06/17 17:39 Dose:  200 mls/hr


Sodium Chloride (Normal Saline)  800 mls @ 100 mls/hr IV ASDIRECTED [facility]


   Stop: 06/06/17 23:13


Iopamidol (Isovue-300 (61%))  100 ml IVPUSH ONETIME ONE


   Stop: 06/06/17 16:31


   Last Admin: 06/06/17 20:19 Dose:  Not Given


Iopamidol (Isovue-300 (61%))  100 ml IVPUSH ONETIME ONE


   Stop: 06/07/17 09:46


   Last Admin: 06/06/17 17:00 Dose:  80 ml


Lorazepam (Ativan)  0.5 mg IVPUSH ONETIME ONE


   Stop: 06/06/17 15:23


   Last Admin: 06/06/17 15:47 Dose:  0.5 mg


Lorazepam (Ativan)  0.5 mg IVPUSH ONETIME ONE


   Stop: 06/06/17 16:29


   Last Admin: 06/06/17 16:36 Dose:  0.5 mg


Ondansetron HCl (Zofran)  4 mg IVPUSH ONETIME ONE


   Stop: 06/06/17 15:16


   Last Admin: 06/06/17 15:55 Dose:  4 mg


Pantoprazole Sodium (Protonix Iv***)  40 mg IVPUSH ONETIME ONE


   Stop: 06/06/17 15:16


   Last Admin: 06/06/17 15:50 Dose:  40 mg


Sodium Chloride (Saline Flush)  10 ml FLUSH ONETIME PRN


   PRN Reason: IV FLUSH


Sodium Chloride (Saline Flush)  10 ml FLUSH ONETIME PRN


   PRN Reason: IV FLUSH


   Stop: 06/07/17 11:00


   Last Admin: 06/06/17 17:05 Dose:  10 ml











- Exam


General: alert, no acute distress


HEENT: Pupils equal, Pupils reactive


Lungs: Clear to auscultation, Normal respiratory effort


Cardiovascular: Regular Rate, Regular Rhythm


Abdomen: bowel sounds present, soft, no tenderness, no distension


 (Female) Exam: Deferred


Back Exam: Normal Inspection, Decreased Range of Motion


Extremities: no edema, normal pulses, no tenderness/swelling, no clubbing, no 

cyanosis, no calf tenderness


Peripheral Pulses: 2+: Dorsalis Pedis (L), Dorsalis Pedis (R)


Skin: warm, dry, intact


Neurological: no new focal deficit


Psy/Mental Status: alert, normal affect, normal mood





- Problem List Review


Problem List Initiated/Reviewed/Updated: Yes





- My Orders


Last 24 Hours: 


My Active Orders





06/07/17 11:59


Consult to Speech Language Pathology [SLP Evaluation and Treatment] [CONS] 

Routine 





06/08/17 09:00


Famotidine [Pepcid]   20 mg PO DAILY 














- Plan


Plan:: 


Assessment/Plan:


Acute: 


AMS with history of Severe Dementia


   - She seems to be at baseline





AUTI


   - Risk factor: Urinary Incontinence


   - UA cx pos for E. coli


   - She is responding to treatment 


   - CRP is 1.7 (2.2)


   - Continue current treatment with IV Rocephin





Moderately Large Hiatal Hernia


   - She is currently on IV Protonix BID


   - She was on H2B for home maintenance, will resume





Anemia


   - Hgb 12 ---> 9.4


   - This could be 2/2 Hemodilution


   - Again, family is refusing further GI work up





CT scan Abdominal/Pelvic Findings


   - Cysts in Liver and Kidneys


 


Resolved:


S/p Dehydration


S/p Coffee Ground Emesis with Gastritis/GERD as risk factors, family refused 

any further work up to include possible EGD


S/p Hypotension


   - 85/46 mmHg this am 


   - She appears clinically stable





Chronic:


HTN


CKD, Stage III


Anxiety and Depression


MAGGIE


Hx/o Gastritis


Severe Dementia 


Urinary Incontinence


Non-Verbal


Hx/o Gastritis/GERD


Vit D Deficiency


Constipation


Dysphagia 3





Plan:


She remains clinically stable and comfortable


SLP eval for swallowing


Resume po meal after above eval


Continue current treatment 


PT/OT as tolerated


DVT/GI prophylaxis


Code status: DNR/DNI


Possible discharge in 1-2 days

## 2017-06-09 NOTE — [id number]
**Discharge Summary





- Hospital Course


Brief History: This is an 84-year-old white female with past medical history of 

advanced dementia, iron deficiency anemia, hypertension, chronic diarrhea, 

gastritis, GERD, urinary urinary incontinence, generalized pain, anxiety, 

depression, and vitamin C deficiency, who comes in to the emergency department 

with complains of 3 episode of coffee ground emesis associated with worsening 

mental status and was found to have urinary tract infection.





- Discharge Data


Discharge Date: 06/09/17


Discharge Disposition: DC/Tfer to [facility] [place] [address]


Condition: Good





- Discharge Diagnosis/Problem(s)


(1) UTI, Urinary tract infectious disease


SNOMED Code(s): 69198359


   ICD Code: N39.0 - URINARY TRACT INFECTION, SITE NOT SPECIFIED   Status: 

Acute   Priority: Medium   





(2) Hiatal hernia with gastroesophageal reflux


SNOMED Code(s): 004930460


   ICD Code: K21.9 - GASTRO-ESOPHAGEAL REFLUX DISEASE WITHOUT ESOPHAGITIS; 

K44.9 - DIAPHRAGMATIC HERNIA WITHOUT OBSTRUCTION OR GANGRENE   Status: Chronic 

  





(3) Dementia


SNOMED Code(s): 31945023


   ICD Code: F03.90 - UNSPECIFIED DEMENTIA WITHOUT BEHAVIORAL DISTURBANCE   

Status: Chronic   Priority: Medium   Onset Date: 08/22/14   





(4) Anemia


SNOMED Code(s): 705570167


   ICD Code: D64.9 - ANEMIA, UNSPECIFIED   Status: Acute   


Qualifiers: 


   Anemia type: iron deficiency   Iron deficiency anemia type: unspecified iron 

deficiency   Qualified Code(s): D50.9 - Iron deficiency anemia, unspecified   





(5) Coffee ground emesis


SNOMED Code(s): 831021939, 488816250


   ICD Code: K92.0 - HEMATEMESIS   Status: Resolved   





(6) Dehydration


SNOMED Code(s): 03852832


   ICD Code: E86.0 - DEHYDRATION   Status: Resolved   Priority: High   





(7) Comfort measures only status


SNOMED Code(s): 70255066601342


   ICD Code: Z51.5 - ENCOUNTER FOR PALLIATIVE CARE   Status: Acute   





- Patient Summary/Data


Operative Procedure(s) Performed: None


Complications: None


Consults: 


 Consultations





06/07/17 09:00


Consult to  [CONS] Routine 


OT Evaluation and Treatment [CONS] Routine 


PT Evaluation and Treatment [CONS] Routine 





06/07/17 11:59


Consult to Speech Language Pathology [SLP Evaluation and Treatment] [CONS] 

Routine 











Recommended Follow-up Testing/Procedures: 


BMP and Mg next week for e-lyte abnormality





Hospital Course: 


Patient was primarily admitted for medical management of urinary tract 

infection along with dehydration. She was provided supportive care and 

appropriate antibiotic. Blood culture was negative for any organism growth. 

However her urine was positive for UTI and she grew Escherichia coli on her 

culture. Once her sensitivity came back, her antibiotic was then descalated. 

She will be discharged with additional course to complete her treatment.





As for her coffee-ground emesis, patient carried a history of iron deficiency 

anemia with chronic gastritis and peptic ulcers disease. However her daughter 

deferred any further workup related to her anemia.





Her hospital course was uncomplicated. However in the setting of her severe 

dementia the patient refused to eat and take her scheduled pills. Speech 

language pathologist was consulted to assess her swallowing. Multiple attempts 

were tried unfortunately without any success. Patient had suffered dehydration 

along with electrolyte imbalances due to poor oral intake. We discussed this 

with her daughter, and finally on the day of discharge, she was made comfort 

care only.





Patient is now ready for discharge. Her vitals are stable. However her overall 

prognosis remains poor in the setting of her advanced dementia. PEG tube 

placement was offered her daughter refused any kind of interventions. Therefore 

she will now be discharged back to local nursing home. Her primary care 

physician has been contacted and discharge care plan was conveyed to him.





At this point the patient will now be comfort care measures. Comfort care 

orders will be done by her primary care physician as soon as she gets to the 

facility.








- Patient Instructions


Diet: Usual Diet as Tolerated


Activity: As Tolerated


Driving: Do Not Drive


Showering/Bathing: May Shower, No Tub Bathing/Swimming


Notify Provider of: Fever, Increased Pain, Nausea and/or Vomiting


Other/Special Instructions: - Please take all medication as directed.  - BMP 

and Mg level check next week for electrolyte abnormality.  - Keep you follow up 

appointment with your doctor.  - Call your doctor for any questions or concerns





- Discharge Plan


Prescriptions/Med Rec: 


Cefuroxime [Ceftin] 250 mg PO BID #6 tablet


Lactobacillus Cmb#7/Fos/Inulin [Probiotic Complex Tablet] 1 each PO BID #6 

tablet


Home Medications: 


 Home Meds





Acetaminophen [Tylenol Extra Strength] 500 mg PO Q4H PRN 04/25/14 [History]


Ascorbic Acid [Vitamin C] 500 mg PO DAILY 04/25/14 [History]


B2/Vit A,C & E/Lut/Zeaxanth/Mn [Icaps] 1 each PO DAILY 04/25/14 [History]


Cholecalciferol (Vitamin D3) [Vitamin D3] 1,000 units PO DAILY 04/25/14 [History

]


Cyanocobalamin (Vitamin B12) [Vitamin B12] 1,000 mcg PO DAILY 04/25/14 [History]


Ferrous Sulfate [Ferosul] 325 mg PO BID 04/25/14 [History]


Gabapentin 1 tab PO DAILY 04/25/14 [History]


Propranolol [Inderal] 10 mg PO BID 04/25/14 [History]


Citalopram Hydrobromide [Celexa] 20 mg PO BEDTIME 08/22/14 [History]


Acetaminophen [Tylenol] 650 mg PO BID 06/06/17 [History]


Memantine HCl [Namenda XR] 14 mg PO DAILY 06/06/17 [History]


Ondansetron [Zofran ODT] 4 mg PO Q4H PRN 06/06/17 [History]


Polyethylene Glycol 3350 [Miralax] 17 gram PO DAILY 06/06/17 [History]


Ranitidine HCl [Zantac] 150 mg PO DAILY 06/06/17 [History]


Sennosides/Docusate Sodium [Senna-Docusate Sodium] 8.6 - 50 mg PO BID 06/06/17 [

History]


Cefuroxime [Ceftin] 250 mg PO BID #6 tablet 06/09/17 [Rx]


Lactobacillus Cmb#7/Fos/Inulin [Probiotic Complex Tablet] 1 each PO BID #6 

tablet 06/09/17 [Rx]








Patient Handouts:  Urinary Tract Infection, Adult, Easy-to-Read


Referrals: 


Alexander Kline MD [Primary Care Provider] - 





- Discharge Summary/Plan Comment


DC Time >30 min.: Yes (45 mins)


Discharge Summary/Plan Comment: 


Discharge to local NH








- General Info


Date of Service: 06/09/17


Subjective Update: 


Follow Up





Functional Status: Reports: pain controlled.  Denies: tolerating diet, 

ambulating, urinating, new symptoms





- Review of Systems


General: Denies: Fever, Weakness, Fatigue, Malaise, Chills


HEENT: Reports: no symptoms


Pulmonary: Denies: shortness of breath


Cardiovascular: Denies: Chest Pain


Gastrointestinal: Denies: Abdominal pain, Nausea, Vomiting


Genitourinary: Reports: no symptoms


Musculoskeletal: Reports: no symptoms


Skin: Reports: no symptoms


Neurological: Reports: Confusion, Pre-Existing Deficit


Psychiatric: Denies: depression, anxiety


Systems Review Comment: 


No overnight or acute issues. She is essentially the same. Her UA is pos for E. 

coli.








- Patient Data


Vitals - Most Recent: 


 Last Vital Signs











Temp  37.0 C   06/09/17 07:35


 


Pulse  76   06/09/17 08:08


 


Resp  12   06/09/17 07:35


 


BP  113/82   06/09/17 08:08


 


Pulse Ox  95   06/09/17 07:35











Weight - Most Recent: 47.627 kg


I&O - Last 24 hours: 


 Intake & Output











 06/08/17 06/09/17 06/09/17





 22:59 06:59 14:59


 


Intake Total 1250 351 


 


Balance 1250 351 











Lab Results - Last 24 hrs: 


 Laboratory Results - last 24 hr











  06/09/17 Range/Units





  06:13 


 


Sodium  141  (136-145)  mEq/L


 


Potassium  2.9 L  (3.5-5.1)  mEq/L


 


Chloride  109 H  ()  mEq/L


 


Carbon Dioxide  18 L  (21-32)  mEq/L


 


Anion Gap  16.9 H  (5-15)  


 


BUN  10  (7-18)  mg/dL


 


Creatinine  1.1 H  (0.55-1.02)  mg/dL


 


Est Cr Clr Drug Dosing  28.62  mL/min


 


Estimated GFR (MDRD)  47  (>60)  mL/min


 


BUN/Creatinine Ratio  9.1 L  (14-18)  


 


Glucose  103  ()  mg/dL


 


Calcium  8.2 L  (8.5-10.1)  mg/dL


 


Magnesium  2.1  (1.8-2.4)  mg/dl


 


C-Reactive Protein  0.9  (<1.0)  mg/dL











Med Orders - Current: 


 Current Medications





Acetaminophen (Tylenol Solution)  650 mg PO Q6H PRN


   PRN Reason: Fever


Citalopram Hydrobromide (Celexa)  20 mg PO BEDTIME [facility]


   Last Admin: 06/08/17 20:03 Dose:  Not Given


Enoxaparin Sodium (Lovenox)  30 mg SUBCUT DAILY [facility]


   Last Admin: 06/09/17 08:17 Dose:  30 mg


Famotidine (Pepcid)  20 mg PO DAILY [facility]


   Last Admin: 06/09/17 08:08 Dose:  Not Given


Ferrous Sulfate (Ferrous Sulfate)  325 mg PO BID [facility]


   Last Admin: 06/09/17 08:08 Dose:  Not Given


Gabapentin (Neurontin)  600 mg PO DAILY [facility]


   Last Admin: 06/09/17 08:08 Dose:  Not Given


Ceftriaxone Sodium 1 gm/ (Sodium Chloride)  100 mls @ 200 mls/hr IV Q24H [facility]


   Last Admin: 06/08/17 16:03 Dose:  200 mls/hr


Promethazine HCl 12.5 mg/ (Sodium Chloride)  50.5 mls @ 100 mls/hr IV Q6H PRN


   PRN Reason: Nausea/Vomiting


Dextrose/Sodium Chloride (Dextrose 5%-Normal Saline)  1,000 mls @ 75 mls[facility]hr [facility] 

[facility] [place]


   Last Admin: 06/08/17 22:45 Dose:  75 mls/hr


Potassium Chloride 10 meq/ (Premix)  100 mls @ 100 mls/hr IV Q1H [facility]


   Stop: 06/09/17 13:44


   Last Admin: 06/09/17 09:35 Dose:  100 mls/hr


Magnesium Sulfate (Pharmacy To Dose - Magnesium Replacement)  1 dose .XX 

ASDIRECTED [facility]


Memantine Extended (Release 14mg)  14 mg PO DAILY [facility]


   Last Admin: 06/06/17 22:27 Dose:  Not Given


Ondansetron HCl (Zofran)  4 mg IVPUSH Q8H PRN


   PRN Reason: Nausea/Vomiting


Potassium Chloride (Pharmacy To Dose - Potassium Replacement)  1 dose .XX 

ASDIRECTED [facility]


Propranolol HCl (Inderal)  10 mg PO BID [facility]


   Last Admin: 06/09/17 08:08 Dose:  Not Given





Discontinued Medications





Sodium Chloride (Normal Saline)  1,000 mls @ 100 mls/hr IV ONETIME ONE


   Stop: 06/07/17 01:13


   Last Admin: 06/06/17 15:45 Dose:  100 mls/hr


Ceftriaxone Sodium 1 gm/ (Sodium Chloride)  100 mls @ 200 mls/hr IV ONETIME ONE


   Stop: 06/06/17 17:40


   Last Admin: 06/06/17 17:39 Dose:  200 mls/hr


Sodium Chloride (Normal Saline)  800 mls @ 100 mls/hr IV ASDIRECTED [facility]


   Stop: 06/06/17 23:13


Sodium Chloride (Normal Saline)  1,000 mls @ 75 mls/hr IV ASDIRECTED [facility]


   Last Admin: 06/08/17 17:50 Dose:  75 mls/hr


Magnesium Sulfate 2 gm/ Premix  50 mls @ 25 mls/hr IV ONETIME ONE


   Stop: 06/08/17 10:59


   Last Admin: 06/08/17 10:18 Dose:  25 mls/hr


Potassium Chloride 10 meq/ (Premix)  100 mls @ 100 mls/hr IV Q1H [facility]


   Stop: 06/08/17 13:59


   Last Admin: 06/08/17 16:23 Dose:  100 mls/hr


Iopamidol (Isovue-300 (61%))  100 ml IVPUSH ONETIME ONE


   Stop: 06/06/17 16:31


   Last Admin: 06/06/17 20:19 Dose:  Not Given


Iopamidol (Isovue-300 (61%))  100 ml IVPUSH ONETIME ONE


   Stop: 06/07/17 09:46


   Last Admin: 06/06/17 17:00 Dose:  80 ml


Lorazepam (Ativan)  0.5 mg IVPUSH ONETIME ONE


   Stop: 06/06/17 15:23


   Last Admin: 06/06/17 15:47 Dose:  0.5 mg


Lorazepam (Ativan)  0.5 mg IVPUSH ONETIME ONE


   Stop: 06/06/17 16:29


   Last Admin: 06/06/17 16:36 Dose:  0.5 mg


Ondansetron HCl (Zofran)  4 mg IVPUSH ONETIME ONE


   Stop: 06/06/17 15:16


   Last Admin: 06/06/17 15:55 Dose:  4 mg


Pantoprazole Sodium (Protonix Iv***)  40 mg IVPUSH ONETIME ONE


   Stop: 06/06/17 15:16


   Last Admin: 06/06/17 15:50 Dose:  40 mg


Pantoprazole Sodium (Protonix Iv***)  40 mg IVPUSH Q12H [facility]


   Last Admin: 06/08/17 20:02 Dose:  40 mg


Potassium Chloride (Pharmacy To Dose - Potassium Replacement)  1 dose .XX 

ASDIRECTED [facility]


Potassium Chloride (Potassium Chloride)  40 meq PO Q4H [facility]


   Stop: 06/08/17 13:01


   Last Admin: 06/08/17 10:21 Dose:  Not Given


Sodium Chloride (Saline Flush)  10 ml FLUSH ASDIRECTED PRN


   PRN Reason: Keep Vein Open


   Last Admin: 06/06/17 15:00 Dose:  10 ml


Sodium Chloride (Saline Flush)  10 ml FLUSH ONETIME PRN


   PRN Reason: IV FLUSH


Sodium Chloride (Saline Flush)  10 ml FLUSH ONETIME PRN


   PRN Reason: IV FLUSH


   Stop: 06/07/17 11:00


   Last Admin: 06/06/17 17:05 Dose:  10 ml











- Exam


General: Reports: no acute distress, other (Awake).  Denies: cooperative


HEENT: Reports: Pupils equal, Pupils reactive


Neck: Reports: supple, trachea midline, no JVD


Lungs: Reports: Clear to auscultation, Normal respiratory effort


Cardiovascular: Reports: Regular Rate, Regular Rhythm


Abdomen: Reports: bowel sounds present, soft, no tenderness, no distension


 (Female) Exam: Deferred


Rectal (Female) Exam: Deferred


Back Exam: Reports: Normal Inspection, Decreased Range of Motion


Extremities: Reports: no edema, normal pulses, no tenderness/swelling, no 

clubbing, no cyanosis, no calf tenderness


Skin: Reports: warm, dry, intact


Neurological: Reports: no new focal deficit


Psy/Mental Status: Reports: alert, normal affect, normal mood





*Q Meaningful Use (DIS)





- VTE *Q


VTE Criteria *Q: 








- Stroke *Q


Stroke Criteria *Q: 








- AMI *Q


AMI Criteria *Q:

## 2021-04-06 ENCOUNTER — HOSPITAL ENCOUNTER (INPATIENT)
Dept: HOSPITAL 41 - JD.ED | Age: 86
LOS: 3 days | Discharge: HOME | DRG: 872 | End: 2021-04-09
Attending: FAMILY MEDICINE | Admitting: FAMILY MEDICINE
Payer: MEDICARE

## 2021-04-06 DIAGNOSIS — A41.51: ICD-10-CM

## 2021-04-06 DIAGNOSIS — I50.9: ICD-10-CM

## 2021-04-06 DIAGNOSIS — R73.9: ICD-10-CM

## 2021-04-06 DIAGNOSIS — F03.90: ICD-10-CM

## 2021-04-06 DIAGNOSIS — A41.9: Primary | ICD-10-CM

## 2021-04-06 DIAGNOSIS — T17.990A: ICD-10-CM

## 2021-04-06 DIAGNOSIS — Z88.6: ICD-10-CM

## 2021-04-06 DIAGNOSIS — N17.9: ICD-10-CM

## 2021-04-06 DIAGNOSIS — D63.1: ICD-10-CM

## 2021-04-06 DIAGNOSIS — F32.9: ICD-10-CM

## 2021-04-06 DIAGNOSIS — Z79.899: ICD-10-CM

## 2021-04-06 DIAGNOSIS — E87.0: ICD-10-CM

## 2021-04-06 DIAGNOSIS — E55.9: ICD-10-CM

## 2021-04-06 DIAGNOSIS — I12.9: ICD-10-CM

## 2021-04-06 DIAGNOSIS — Z20.822: ICD-10-CM

## 2021-04-06 DIAGNOSIS — R73.03: ICD-10-CM

## 2021-04-06 DIAGNOSIS — J98.11: ICD-10-CM

## 2021-04-06 DIAGNOSIS — K21.9: ICD-10-CM

## 2021-04-06 DIAGNOSIS — N30.00: ICD-10-CM

## 2021-04-06 DIAGNOSIS — H35.30: ICD-10-CM

## 2021-04-06 DIAGNOSIS — D64.9: ICD-10-CM

## 2021-04-06 DIAGNOSIS — F41.9: ICD-10-CM

## 2021-04-06 DIAGNOSIS — Z66: ICD-10-CM

## 2021-04-06 DIAGNOSIS — N18.9: ICD-10-CM

## 2021-04-06 DIAGNOSIS — R65.20: ICD-10-CM

## 2021-04-06 DIAGNOSIS — I13.0: ICD-10-CM

## 2021-04-06 LAB
HBA1C BLD-MCNC: 8.5 %
SARS-COV-2 RNA RESP QL NAA+PROBE: NEGATIVE

## 2021-04-06 PROCEDURE — 87040 BLOOD CULTURE FOR BACTERIA: CPT

## 2021-04-06 PROCEDURE — 96365 THER/PROPH/DIAG IV INF INIT: CPT

## 2021-04-06 PROCEDURE — 83735 ASSAY OF MAGNESIUM: CPT

## 2021-04-06 PROCEDURE — 92610 EVALUATE SWALLOWING FUNCTION: CPT

## 2021-04-06 PROCEDURE — 87086 URINE CULTURE/COLONY COUNT: CPT

## 2021-04-06 PROCEDURE — 84443 ASSAY THYROID STIM HORMONE: CPT

## 2021-04-06 PROCEDURE — 82962 GLUCOSE BLOOD TEST: CPT

## 2021-04-06 PROCEDURE — 86140 C-REACTIVE PROTEIN: CPT

## 2021-04-06 PROCEDURE — 83605 ASSAY OF LACTIC ACID: CPT

## 2021-04-06 PROCEDURE — U0002 COVID-19 LAB TEST NON-CDC: HCPCS

## 2021-04-06 PROCEDURE — 84100 ASSAY OF PHOSPHORUS: CPT

## 2021-04-06 PROCEDURE — 80053 COMPREHEN METABOLIC PANEL: CPT

## 2021-04-06 PROCEDURE — 36415 COLL VENOUS BLD VENIPUNCTURE: CPT

## 2021-04-06 PROCEDURE — C9113 INJ PANTOPRAZOLE SODIUM, VIA: HCPCS

## 2021-04-06 PROCEDURE — 51702 INSERT TEMP BLADDER CATH: CPT

## 2021-04-06 PROCEDURE — 81001 URINALYSIS AUTO W/SCOPE: CPT

## 2021-04-06 PROCEDURE — 71045 X-RAY EXAM CHEST 1 VIEW: CPT

## 2021-04-06 PROCEDURE — 99285 EMERGENCY DEPT VISIT HI MDM: CPT

## 2021-04-06 PROCEDURE — 83036 HEMOGLOBIN GLYCOSYLATED A1C: CPT

## 2021-04-06 PROCEDURE — 85025 COMPLETE CBC W/AUTO DIFF WBC: CPT

## 2021-04-06 PROCEDURE — 84145 PROCALCITONIN (PCT): CPT

## 2021-04-06 PROCEDURE — 0240U: CPT

## 2021-04-06 PROCEDURE — 87641 MR-STAPH DNA AMP PROBE: CPT

## 2021-04-06 PROCEDURE — 94640 AIRWAY INHALATION TREATMENT: CPT

## 2021-04-06 PROCEDURE — 82009 KETONE BODYS QUAL: CPT

## 2021-04-06 PROCEDURE — 94761 N-INVAS EAR/PLS OXIMETRY MLT: CPT

## 2021-04-06 PROCEDURE — 83880 ASSAY OF NATRIURETIC PEPTIDE: CPT

## 2021-04-06 PROCEDURE — 80048 BASIC METABOLIC PNL TOTAL CA: CPT

## 2021-04-06 RX ADMIN — HEPARIN SODIUM SCH UNITS: 5000 INJECTION, SOLUTION INTRAVENOUS; SUBCUTANEOUS at 18:05

## 2021-04-06 NOTE — CR
Chest: Portable view of the chest was obtained.

 

Comparison: Prior chest x-ray of 06/06/17.

 

Heart size and mediastinum are within normal limits for portable 

technique.  Lungs show no definite acute parenchymal change.  Bony 

structures are osteopenic.

 

Impression:

1.  Nothing acute is definitely seen on portable chest x-ray.

 

Diagnostic code #2

## 2021-04-06 NOTE — PCM.HP.2
H&P History of Present Illness





- General


Date of Service: 04/06/21


Admit Problem/Dx: 


                           Admission Diagnosis/Problem





Admission Diagnosis/Problem      Sepsis











- History of Present Illness


Initial Comments - Free Text/Narative: 





88-year-old female with severe dementia presents via EMS from her residence at 

[facility] with a fever, decreased oral intake, and congestion 

starting this morning.  Per her daughter and the emergency department notes this

morning she had decreasing appetite and oral intake, increased heart rate and 

fever.  Patient was sent to the emergency department where she was found to have

a fever of 102, heart rate of 110, respiratory rate of 36, and oxygen 

saturations of 91% on 4 L.  Patient is nonverbal and unable to communicate 

secondary to severe dementia at baseline.


In the emergency department patient was found to have a severe UTI with WBCs 

greater than 100 on UA.  Chest x-ray was clear.  Other labs of significance were

normal WBC of 8.35 with no left shift.  Anion gap was significantly elevated at 

21 and kidney function was significantly reduced with an estimated GFR of 21, 

creatinine 2.2, BUN 43.  She was acidotic with a bicarb of 20.  Lactic acid was 

5.2.  Of note her glucose was 489.  Her daughter states that they just recently 

diagnosed her with prediabetes and apparently she was started on Glucophage.  

Patient was given a fluid bolus of 30 mL/kg and started on Rocephin 2 g.  Repeat

lactic acid 3 hours later was 2.8.





- Related Data


Allergies/Adverse Reactions: 


                                    Allergies











Allergy/AdvReac Type Severity Reaction Status Date / Time


 


meperidine [From Demerol] Allergy  Cannot Verified 06/06/17 15:11





   Remember  


 


hydrocodone AdvReac  Vomiting Verified 06/08/17 08:43


 


tramadol AdvReac  Dizziness Verified 06/08/17 08:43











Home Medications: 


                                    Home Meds





Acetaminophen 500 mg PO TID PRN 04/06/21 [History]


Citalopram Hydrobromide [Citalopram HBr] 10 mg PO BEDTIME 04/06/21 [History]


Cyanocobalamin (Vitamin B-12) [B-12] 1,000 mcg PO BEDTIME 04/06/21 [History]


Docusate Sodium/Sennosides [Senna Plus] 1 tab PO BID 04/06/21 [History]


Ferrous Sulfate 325 mg PO BEDTIME 04/06/21 [History]


Gabapentin 600 mg PO BEDTIME 04/06/21 [History]


Lactobacillus Acidophilus/Fos [Acidophilus Probiotic Tablet] 1 tab PO BID 

04/06/21 [History]


Magnesium Hydroxide [Milk of Magnesia] 30 ml PO TID PRN 04/06/21 [History]


Memantine [Namenda Xr] 14 mg PO DAILY 04/06/21 [History]


Omeprazole 20 mg PO ACBREAKFAST 04/06/21 [History]


Ondansetron [Zofran ODT] 4 mg PO Q4H PRN 04/06/21 [History]


bisacodyL [Dulcolax] 10 mg PO DAILY PRN 04/06/21 [History]


bisacodyL [Dulcolax] 10 mg RECTAL DAILY PRN 04/06/21 [History]


metFORMIN [Glucophage] 250 mg PO BID 04/06/21 [History]


polyethylene glycoL 3350 [MiraLAX] 17 gm PO DAILY 04/06/21 [History]











Past Medical History


HEENT History: Reports: Macular Degeneration


Cardiovascular History: Reports: Hypertension


Respiratory History: Reports: None


Gastrointestinal History: Reports: GERD


Other Gastrointestinal History: noninfective gastroenteritis, colitis, diarrhea,

 nausea


Genitourinary History: Reports: Chronic Renal Insuffiency


OB/GYN History: Reports: Pregnancy


Psychiatric History: Reports: Anxiety, Dementia, Depression, Other (See Below)


Other Psychiatric History: no behavioral disturbances.


Endocrine/Metabolic History: Reports: Vitamin D Deficiency


Hematologic History: Reports: Anemia





- Past Surgical History


GI Surgical History: Reports: Other (See Below)


Other GI Surgeries/Procedures: colitis


Female  Surgical History: Reports: Mastectomy


Other Female  Surgeries/Procedures: Left sided mastectomy





Social & Family History





- Family History


Family Medical History: No Pertinent Family History





- Tobacco Use


Tobacco Use Status *Q: Never Tobacco User





- Caffeine Use


Caffeine Use: Reports: None





- Recreational Drug Use


Recreational Drug Use: No





- Living Situation & Occupation


Living situation: Reports: Extended Care Facility





H&P Review of Systems





- Review of Systems:


Review Of Systems: Unable To Obtain


Reason Not Obtained: Secondary to severe dementia





Exam





- Exam


Exam: See Below





- Vital Signs


Vital Signs: 


                                Last Vital Signs











Temp  98.1 F   04/06/21 15:45


 


Pulse  92   04/06/21 16:30


 


Resp  18   04/06/21 15:45


 


BP  150/75 H  04/06/21 16:30


 


Pulse Ox  90 L  04/06/21 15:45











Weight: 131 lb 11.2 oz





- Exam


Quality Assessment: Supplemental Oxygen


General: Alert, Oriented, 4


HEENT: Conjunctiva Clear, Mucosa Moist & Pink, Normal Nasal Septum


Lungs: Clear to Auscultation, Decreased Breath Sounds.  No: Normal Respiratory 

Effort (Increased rate and effort)


Cardiovascular: Regular Rate, Regular Rhythm


GI/Abdominal Exam: Normal Bowel Sounds, Soft, Non-Tender, No Organomegaly, No 

Distention, No Abnormal Bruit


Extremities: Normal Inspection, Normal Range of Motion, Non-Tender, No Pedal 

Edema, Normal Capillary Refill


Peripheral Pulses: 2+: Posterior Tibial (L), Posterior Tibial (R), Dorsalis 

Pedis (L), Dorsalis Pedis (R)


Skin: Warm, Dry, Intact


Psychiatric: Alert, Normal Affect, Normal Mood





- Patient Data


Lab Results Last 24 hrs: 


                         Laboratory Results - last 24 hr











  04/06/21 04/06/21 04/06/21 Range/Units





  12:55 12:55 12:55 


 


WBC  8.35    (3.98-10.04)  K/mm3


 


RBC  4.54    (3.98-5.22)  M/mm3


 


Hgb  13.4  D    (11.2-15.7)  gm/dl


 


Hct  43.5    (34.1-44.9)  %


 


MCV  95.8 H D    (79.4-94.8)  fl


 


MCH  29.5    (25.6-32.2)  pg


 


MCHC  30.8 L    (32.2-35.5)  g/dl


 


RDW Std Deviation  50.5 H    (36.4-46.3)  fL


 


Plt Count  345  D    (182-369)  K/mm3


 


MPV  11.9    (9.4-12.3)  fl


 


Neut % (Auto)  65.6    (34.0-71.1)  %


 


Lymph % (Auto)  18.8 L    (19.3-51.7)  %


 


Mono % (Auto)  14.6 H    (4.7-12.5)  %


 


Eos % (Auto)  0 L    (0.7-5.8)  


 


Baso % (Auto)  0.4    (0.1-1.2)  %


 


Neut # (Auto)  5.48    (1.56-6.13)  K/mm3


 


Lymph # (Auto)  1.57    (1.18-3.74)  K/mm3


 


Mono # (Auto)  1.22 H    (0.24-0.36)  K/mm3


 


Eos # (Auto)  0.00 L    (0.04-0.36)  K/mm3


 


Baso # (Auto)  0.03    (0.01-0.08)  K/mm3


 


Sodium   145   (136-145)  mEq/L


 


Potassium   4.2   (3.5-5.1)  mEq/L


 


Chloride   108 H   ()  mEq/L


 


Carbon Dioxide   20 L   (21-32)  mEq/L


 


Anion Gap   21.2 H   (5-15)  


 


BUN   43 H D   (7-18)  mg/dL


 


Creatinine   2.2 H   (0.55-1.02)  mg/dL


 


Est Cr Clr Drug Dosing   15.26   mL/min


 


Estimated GFR (MDRD)   21   (>60)  mL/min


 


BUN/Creatinine Ratio   19.5 H   (14-18)  


 


Glucose   489 H   ()  mg/dL


 


Lactic Acid     (0.4-2.0)  mmol/L


 


Calcium   9.3   (8.5-10.1)  mg/dL


 


Total Bilirubin   1.1 H   (0.2-1.0)  mg/dL


 


AST   30   (15-37)  U/L


 


ALT   60 H   (14-59)  U/L


 


Alkaline Phosphatase   104   ()  U/L


 


C-Reactive Protein   19.2 H*   (<1.0)  mg/dL


 


NT-Pro-B Natriuret Pep    8711 H  (0-450)  pg/mL


 


Total Protein   8.5 H   (6.4-8.2)  g/dl


 


Albumin   3.4   (3.4-5.0)  g/dl


 


Globulin   5.1   gm/dL


 


Albumin/Globulin Ratio   0.7 L   (1-2)  


 


Urine Color     (Yellow)  


 


Urine Appearance     (Clear)  


 


Urine pH     (5.0-8.0)  


 


Ur Specific Gravity     (1.005-1.030)  


 


Urine Protein     (Negative)  


 


Urine Glucose (UA)     (Negative)  


 


Urine Ketones     (Negative)  


 


Urine Occult Blood     (Negative)  


 


Urine Nitrite     (Negative)  


 


Urine Bilirubin     (Negative)  


 


Urine Urobilinogen     (0.2-1.0)  


 


Ur Leukocyte Esterase     (Negative)  


 


Urine RBC     (0-5)  /hpf


 


Urine WBC     (0-5)  /hpf


 


Ur Squamous Epith Cells     (0-5)  /hpf


 


Amorphous Sediment     (NOT SEEN)  /hpf


 


Urine Bacteria     (FEW)  /hpf


 


Urine Mucus     (FEW)  /hpf


 


Influenza Type A RNA     (NEGATIVE)  


 


Influenza Type B RNA     (NEGATIVE)  


 


SARS-CoV-2 RNA (YASH)     (NEGATIVE)  














  04/06/21 04/06/21 04/06/21 Range/Units





  12:55 13:08 13:10 


 


WBC     (3.98-10.04)  K/mm3


 


RBC     (3.98-5.22)  M/mm3


 


Hgb     (11.2-15.7)  gm/dl


 


Hct     (34.1-44.9)  %


 


MCV     (79.4-94.8)  fl


 


MCH     (25.6-32.2)  pg


 


MCHC     (32.2-35.5)  g/dl


 


RDW Std Deviation     (36.4-46.3)  fL


 


Plt Count     (182-369)  K/mm3


 


MPV     (9.4-12.3)  fl


 


Neut % (Auto)     (34.0-71.1)  %


 


Lymph % (Auto)     (19.3-51.7)  %


 


Mono % (Auto)     (4.7-12.5)  %


 


Eos % (Auto)     (0.7-5.8)  


 


Baso % (Auto)     (0.1-1.2)  %


 


Neut # (Auto)     (1.56-6.13)  K/mm3


 


Lymph # (Auto)     (1.18-3.74)  K/mm3


 


Mono # (Auto)     (0.24-0.36)  K/mm3


 


Eos # (Auto)     (0.04-0.36)  K/mm3


 


Baso # (Auto)     (0.01-0.08)  K/mm3


 


Sodium     (136-145)  mEq/L


 


Potassium     (3.5-5.1)  mEq/L


 


Chloride     ()  mEq/L


 


Carbon Dioxide     (21-32)  mEq/L


 


Anion Gap     (5-15)  


 


BUN     (7-18)  mg/dL


 


Creatinine     (0.55-1.02)  mg/dL


 


Est Cr Clr Drug Dosing     mL/min


 


Estimated GFR (MDRD)     (>60)  mL/min


 


BUN/Creatinine Ratio     (14-18)  


 


Glucose     ()  mg/dL


 


Lactic Acid  5.2 H*    (0.4-2.0)  mmol/L


 


Calcium     (8.5-10.1)  mg/dL


 


Total Bilirubin     (0.2-1.0)  mg/dL


 


AST     (15-37)  U/L


 


ALT     (14-59)  U/L


 


Alkaline Phosphatase     ()  U/L


 


C-Reactive Protein     (<1.0)  mg/dL


 


NT-Pro-B Natriuret Pep     (0-450)  pg/mL


 


Total Protein     (6.4-8.2)  g/dl


 


Albumin     (3.4-5.0)  g/dl


 


Globulin     gm/dL


 


Albumin/Globulin Ratio     (1-2)  


 


Urine Color    Dark yellow  (Yellow)  


 


Urine Appearance    Cloudy H  (Clear)  


 


Urine pH    6.0  (5.0-8.0)  


 


Ur Specific Gravity    > or = 1.030  (1.005-1.030)  


 


Urine Protein    3+ H  (Negative)  


 


Urine Glucose (UA)    2+ H  (Negative)  


 


Urine Ketones    Trace H  (Negative)  


 


Urine Occult Blood    3+ H  (Negative)  


 


Urine Nitrite    Negative  (Negative)  


 


Urine Bilirubin    1+ H  (Negative)  


 


Urine Urobilinogen    1.0  (0.2-1.0)  


 


Ur Leukocyte Esterase    3+ H  (Negative)  


 


Urine RBC    20-30 H  (0-5)  /hpf


 


Urine WBC    >100 H  (0-5)  /hpf


 


Ur Squamous Epith Cells    0-5  (0-5)  /hpf


 


Amorphous Sediment    Moderate H  (NOT SEEN)  /hpf


 


Urine Bacteria    Many H  (FEW)  /hpf


 


Urine Mucus    Not seen  (FEW)  /hpf


 


Influenza Type A RNA   Negative   (NEGATIVE)  


 


Influenza Type B RNA   Negative   (NEGATIVE)  


 


SARS-CoV-2 RNA (YASH)   Negative   (NEGATIVE)  














  04/06/21 Range/Units





  16:02 


 


WBC   (3.98-10.04)  K/mm3


 


RBC   (3.98-5.22)  M/mm3


 


Hgb   (11.2-15.7)  gm/dl


 


Hct   (34.1-44.9)  %


 


MCV   (79.4-94.8)  fl


 


MCH   (25.6-32.2)  pg


 


MCHC   (32.2-35.5)  g/dl


 


RDW Std Deviation   (36.4-46.3)  fL


 


Plt Count   (182-369)  K/mm3


 


MPV   (9.4-12.3)  fl


 


Neut % (Auto)   (34.0-71.1)  %


 


Lymph % (Auto)   (19.3-51.7)  %


 


Mono % (Auto)   (4.7-12.5)  %


 


Eos % (Auto)   (0.7-5.8)  


 


Baso % (Auto)   (0.1-1.2)  %


 


Neut # (Auto)   (1.56-6.13)  K/mm3


 


Lymph # (Auto)   (1.18-3.74)  K/mm3


 


Mono # (Auto)   (0.24-0.36)  K/mm3


 


Eos # (Auto)   (0.04-0.36)  K/mm3


 


Baso # (Auto)   (0.01-0.08)  K/mm3


 


Sodium   (136-145)  mEq/L


 


Potassium   (3.5-5.1)  mEq/L


 


Chloride   ()  mEq/L


 


Carbon Dioxide   (21-32)  mEq/L


 


Anion Gap   (5-15)  


 


BUN   (7-18)  mg/dL


 


Creatinine   (0.55-1.02)  mg/dL


 


Est Cr Clr Drug Dosing   mL/min


 


Estimated GFR (MDRD)   (>60)  mL/min


 


BUN/Creatinine Ratio   (14-18)  


 


Glucose   ()  mg/dL


 


Lactic Acid  2.8 H*  (0.4-2.0)  mmol/L


 


Calcium   (8.5-10.1)  mg/dL


 


Total Bilirubin   (0.2-1.0)  mg/dL


 


AST   (15-37)  U/L


 


ALT   (14-59)  U/L


 


Alkaline Phosphatase   ()  U/L


 


C-Reactive Protein   (<1.0)  mg/dL


 


NT-Pro-B Natriuret Pep   (0-450)  pg/mL


 


Total Protein   (6.4-8.2)  g/dl


 


Albumin   (3.4-5.0)  g/dl


 


Globulin   gm/dL


 


Albumin/Globulin Ratio   (1-2)  


 


Urine Color   (Yellow)  


 


Urine Appearance   (Clear)  


 


Urine pH   (5.0-8.0)  


 


Ur Specific Gravity   (1.005-1.030)  


 


Urine Protein   (Negative)  


 


Urine Glucose (UA)   (Negative)  


 


Urine Ketones   (Negative)  


 


Urine Occult Blood   (Negative)  


 


Urine Nitrite   (Negative)  


 


Urine Bilirubin   (Negative)  


 


Urine Urobilinogen   (0.2-1.0)  


 


Ur Leukocyte Esterase   (Negative)  


 


Urine RBC   (0-5)  /hpf


 


Urine WBC   (0-5)  /hpf


 


Ur Squamous Epith Cells   (0-5)  /hpf


 


Amorphous Sediment   (NOT SEEN)  /hpf


 


Urine Bacteria   (FEW)  /hpf


 


Urine Mucus   (FEW)  /hpf


 


Influenza Type A RNA   (NEGATIVE)  


 


Influenza Type B RNA   (NEGATIVE)  


 


SARS-CoV-2 RNA (YASH)   (NEGATIVE)  











Result Diagrams: 


                                 04/06/21 12:55





                                 04/06/21 12:55





Sepsis Event Note





- Evaluation


Sepsis Screening Result: Severe Sepsis Risk





- Focused Exam


Vital Signs: 


                                   Vital Signs











  Temp Temp Pulse Pulse Resp BP Pulse Ox


 


 04/06/21 16:30    92    150/75 H 


 


 04/06/21 16:10    89    153/79 H 


 


 04/06/21 15:59    88    147/71 H 


 


 04/06/21 15:45   98.1 F  96   18  149/79 H  90 L


 


 04/06/21 15:05  102.4 F H      


 


 04/06/21 12:42   102.4 F H   110 H  36 H  146/80 H  91 L














- Problem List


(1) Severe sepsis


SNOMED Code(s): 21711768


   ICD Code: A41.9 - SEPSIS, UNSPECIFIED ORGANISM; R65.20 - SEVERE SEPSIS 

WITHOUT SEPTIC SHOCK   Status: Acute   Current Visit: Yes   





(2) Acute on chronic renal failure


SNOMED Code(s): 002500646


   ICD Code: N17.9 - ACUTE KIDNEY FAILURE, UNSPECIFIED; N18.9 - CHRONIC KIDNEY 

DISEASE, UNSPECIFIED   Status: Acute   Current Visit: Yes   





(3) Hyperglycemia


SNOMED Code(s): 18240498


   ICD Code: R73.9 - HYPERGLYCEMIA, UNSPECIFIED   Status: Acute   Current Visit:

 Yes   





(4) Advanced dementia


SNOMED Code(s): 55033757


   ICD Code: F03.90 - UNSPECIFIED DEMENTIA WITHOUT BEHAVIORAL DISTURBANCE   

Status: Acute   Current Visit: Yes   





(5) Renal insufficiency


SNOMED Code(s): 283734121, 115562601


   ICD Code: N28.9 - DISORDER OF KIDNEY AND URETER, UNSPECIFIED   Status: Acute 

  Current Visit: Yes   


Problem List Initiated/Reviewed/Updated: Yes


Orders Last 24hrs: 


                               Active Orders 24 hr











 Category Date Time Status


 


 Admission Status [Patient Status] [ADT] Routine ADT  04/06/21 14:16 Active


 


 Antiembolic Devices [RC] PER UNIT ROUTINE Care  04/06/21 17:06 Ordered


 


 Bedrest Bedside Commode [RC] ASDIRECTED Care  04/06/21 16:55 Ordered


 


 Insert Marroquin Catheter [Insert Urinary Catheter] [OM.PC] Care  04/06/21 13:10 

Ordered





 Stat   


 


 Oxygen Therapy Adult [Oxygen Therapy, ED] [RC] Care  04/06/21 12:42 Active





 ASDIRECTED   


 


 Oxygen Therapy [RC] PRN Care  04/06/21 16:56 Ordered


 


 Peripheral IV Care [RC] .AS DIRECTED Care  04/06/21 13:07 Active


 


 RT Aerosol Therapy [RC] ASDIRECTED Care  04/06/21 17:06 Ordered


 


 Urinary Catheter Assessment [RC] ASDIRECTED Care  04/06/21 13:10 Active


 


 VTE/DVT Education [RC] PER UNIT ROUTINE Care  04/06/21 16:56 Ordered


 


 Vital Signs [RC] Q4H Care  04/06/21 16:56 Ordered


 


 Regular Diet [DIET] Diet  04/06/21 Dinner Ordered


 


 CULTURE BLOOD [BC] Stat Lab  04/06/21 13:04 Ordered


 


 CULTURE BLOOD [BC] Stat Lab  04/06/21 13:04 Ordered


 


 CULTURE URINE [RM] Stat Lab  04/06/21 13:10 Received


 


 Acetaminophen [TylenoL] Med  04/06/21 17:04 Ordered





 650 mg PO Q4H PRN   


 


 Acetaminophen [Tylenol] Med  04/06/21 17:04 Ordered





 650 mg RECTAL Q4H PRN   


 


 Albuterol [Proventil Neb Soln] Med  04/06/21 17:04 Ordered





 2.5 mg NEB Q2H PRN   


 


 Albuterol/Ipratropium [DuoNeb 3.0-0.5 MG/3 ML] Med  04/06/21 17:04 Ordered





 3 ml NEB Q4H PRN   


 


 Heparin Sodium Med  04/06/21 17:15 Ordered





 5,000 units SUBCUT Q8H   


 


 Insulin Lispro [HumaLOG] Med  04/06/21 17:15 Once





 8 unit SUBCUT ONETIME ONE   


 


 Omeprazole Med  04/07/21 06:00 Ordered





 20 mg PO ACBREAKFAST   


 


 Sodium Chloride 0.9% [Normal Saline] 1,000 ml Med  04/06/21 16:45 Active





 IV ASDIRECTED   


 


 Sodium Chloride 0.9% [Saline Flush] Med  04/06/21 13:07 Active





 10 ml FLUSH ASDIRECTED PRN   


 


 cefTRIAXone [Rocephin] 2 gm Med  04/07/21 13:00 Ordered





 Sodium Chloride 0.9% [Normal Saline] 100 ml   





 IV Q24H   


 


 Blood Culture x2 Reflex Set [OM.PC] Stat Oth  04/06/21 13:04 Ordered


 


 Isolation [COMM] Routine Oth  04/06/21 16:23 Ordered


 


 Peripheral IV Insertion Adult [OM.PC] Routine Oth  04/06/21 13:07 Ordered


 


 Sequential Compression Device [OM.PC] Per Unit Routine Ot  04/06/21 16:59 

Ordered


 


 Resuscitation Status Routine Resus Stat  04/06/21 16:55 Ordered








                                Medication Orders





Acetaminophen (Acetaminophen 325 Mg Tab)  650 mg PO Q4H PRN


   PRN Reason: Pain (Mild 1-3)/fever


Acetaminophen (Acetaminophen 650 Mg Supp)  650 mg RECTAL Q4H PRN


   PRN Reason: Pain (mild 1-3)


Albuterol (Albuterol 0.083% 2.5 Mg/3 Ml Neb Soln)  2.5 mg NEB Q2H PRN


   PRN Reason: Shortness Of Breath/wheezing


Albuterol/Ipratropium (Albuterol/Ipratropium 3.0-0.5 Mg/3 Ml Neb Soln)  3 ml NEB

 Q4H PRN


   PRN Reason: Shortness Of Breath/wheezing


Heparin Sodium (Porcine) (Heparin Sodium 5,000 Units/Ml Vial)  5,000 units 

SUBCUT Q8H [facility]


Sodium Chloride (Normal Saline)  1,000 mls @ 125 mls/hr IV ASDIRECTED ANA


Ceftriaxone Sodium 2 gm/ (Sodium Chloride)  100 mls @ 200 mls/hr IV Q24H [facility]


Insulin Human Lispro (Insulin Lispro 100 Unit/Ml)  8 unit SUBCUT ONETIME ONE


   Stop: 04/06/21 17:16


Pantoprazole Sodium (Pantoprazole 40 Mg Tab.Cr)  40 mg PO DAILY@0700 [facility]


Sodium Chloride (Sodium Chloride 0.9% 10 Ml Syringe)  10 ml FLUSH ASDIRECTED PRN


   PRN Reason: Keep Vein Open


   Last Admin: 04/06/21 13:11  Dose: 10 ml


   Documented by: MADDIE








Assessment/Plan Comment:: 





Assessment


88-year-old female with severe dementia presents with severe sepsis secondary to

 UTI.


Severe sepsis


Complicated UTI


* On presentation to the emergency department patient was febrile, tachycardic, 

  tachypneic, and hypoxemia


* Initial lactic acid was 5.2 with a repeat of 2.8 3 hours later and after fluid

   bolus.


* Acute on chronic renal insufficiency, lactic acid, elevated BNP showing organ 

  dysfunction


* Anion gap elevated at 21 and serum bicarb of 20


* Normal white count of 8.35, C-reactive protein 19.2


* UA: WBC greater than 100


* Started on Rocephin in the ER after getting blood cultures and urine culture


* Given 30 mL/kg IV fluid bolus


Plan


* Admit to ICU


* Follow lactic acid


* Normal saline at 125 mL/h


* Rocephin 2 g every 24 hours


* Await blood and urine cultures


* Discussed with daughter high mortality rate with severe sepsis and 88-year-old

   with multiple medical problems.  We will keep her informed of any 

  complications occur during treatment.


Acute on chronic renal insufficiency


* Secondary to sepsis


* Creatinine 2.2 and estimated GFR of 21


* No recent renal function available, but in June 2017 estimated GFR was 47


Plan


* Continue IV fluids


* Follow kidney function closely


* Renally dose medications and avoid nephrotoxic medications


Hyperglycemia, history of prediabetes


* Blood sugar 489 on presentation


* Unknown hemoglobin A1c


* Fluid bolus done in ER


Plan


* Sliding scale insulin


* Bedside glucose monitoring 4 times daily


* Get hemoglobin A1c


CHF


* proBNP is 8711


* Chest x-ray shows no acute findings


* proBNP likely artificially elevated secondary to renal dysfunction, but still 

  significantly elevated


* Fluid bolus secondary to sepsis going to exacerbate CHF


* Patient is at high risk for pulmonary edema


Plan


* Currently on 4 L nasal cannula


* Marroquin catheter for strict I's and O's will for sepsis and CHF


* Monitor respiratory status closely





Chronic: Hypertension, GERD, colitis, chronic renal insufficiency, anxiety, 

dementia, depression, vitamin D deficiencies, behavioral disturbances, anemia





VTE prophylaxis with heparin


CODE STATUS: DNR/DNI


Prognosis is very poor.  Discussed with daughter who is POA and she voices 

understanding.





- Mortality Measure


Prognosis:: Poor (High risk for mortality)

## 2021-04-06 NOTE — EDM.PDOC
ED HPI GENERAL MEDICAL PROBLEM





- General


Chief Complaint: Respiratory Problem


Stated Complaint: [facility]


Time Seen by Provider: 04/06/21 12:48


Source of Information: Reports: EMS, Family, Nursing Home Records


History Limitations: Reports: Altered Mental Status





- History of Present Illness


INITIAL COMMENTS - FREE TEXT/NARRATIVE: 





The patient presents by [facility] from [facility] for a 

fever and congestion.  The patient was doing fine yesterday.  This morning she 

did not want to eat.  She has advanced dementia and she does not talk and she is

mostly in bed.  She then developed some course lung sounds and a fever.  Her 

oxygen saturations were low and she needed some oxygen.  She has no cough and no

vomiting.


Onset: Gradual


Duration: Hour(s):


Severity: Moderate


Improves with: Reports: None


Worsens with: Reports: None


Associated Symptoms: Reports: Fever/Chills, Shortness of Breath.  Denies: 

Nausea/Vomiting





- Related Data


                                    Allergies











Allergy/AdvReac Type Severity Reaction Status Date / Time


 


meperidine [From Demerol] Allergy  Cannot Verified 06/06/17 15:11





   Remember  


 


hydrocodone AdvReac  Vomiting Verified 06/08/17 08:43


 


tramadol AdvReac  Dizziness Verified 06/08/17 08:43














Past Medical History


HEENT History: Reports: Macular Degeneration


Cardiovascular History: Reports: Hypertension


Respiratory History: Reports: None


Gastrointestinal History: Reports: GERD


Other Gastrointestinal History: noninfective gastroenteritis, colitis, diarrhea,

nausea


Genitourinary History: Reports: Chronic Renal Insuffiency


OB/GYN History: Reports: Pregnancy


Psychiatric History: Reports: Anxiety, Dementia, Depression, Other (See Below)


Other Psychiatric History: no behavioral disturbances.


Endocrine/Metabolic History: Reports: Vitamin D Deficiency


Hematologic History: Reports: Anemia





- Past Surgical History


GI Surgical History: Reports: Other (See Below)


Other GI Surgeries/Procedures: colitis





Social & Family History





- Family History


Family Medical History: Unobtainable





- Tobacco Use


Tobacco Use Status *Q: Never Tobacco User





- Caffeine Use


Caffeine Use: Reports: None





- Recreational Drug Use


Recreational Drug Use: No





- Living Situation & Occupation


Living situation: Reports: Extended Care Facility





ED ROS GENERAL





- Review of Systems


Review Of Systems: See Below


Constitutional: Reports: Fever


HEENT: Reports: No Symptoms


Respiratory: Reports: Shortness of Breath, Other (course lung sounds)


Cardiovascular: Reports: No Symptoms


Endocrine: Reports: No Symptoms


GI/Abdominal: Reports: No Symptoms





ED EXAM, GENERAL





- Physical Exam


Exam: See Below


Exam Limited By: Altered Mental Status


General Appearance: No Apparent Distress, Other (Sleepy)


Ears: Normal External Exam


Nose: Normal Inspection


Head: Atraumatic, Normocephalic


Neck: Normal Inspection


Respiratory/Chest: No Respiratory Distress, Rhonchi


Cardiovascular: Regular Rate, Rhythm, No Edema, No Murmur


GI/Abdominal: Soft, Non-Tender, No Organomegaly, No Mass


Extremities: Normal Inspection


Neurological: Other (sleepy)





Course





- Vital Signs


Last Recorded V/S: 


                                Last Vital Signs











Temp  102.4 F H  04/06/21 12:42


 


Pulse  110 H  04/06/21 12:42


 


Resp  36 H  04/06/21 12:42


 


BP  146/80 H  04/06/21 12:42


 


Pulse Ox  91 L  04/06/21 12:42














- Orders/Labs/Meds


Orders: 


                               Active Orders 24 hr











 Category Date Time Status


 


 Insert Marroquin Catheter [Insert Urinary Catheter] [OM.PC] Care  04/06/21 13:10 

Ordered





 Stat   


 


 Peripheral IV Care [RC] .AS DIRECTED Care  04/06/21 13:07 Active


 


 Urinary Catheter Assessment [RC] ASDIRECTED Care  04/06/21 13:10 Active


 


 CULTURE BLOOD [BC] Stat Lab  04/06/21 13:04 Ordered


 


 CULTURE BLOOD [BC] Stat Lab  04/06/21 13:04 Ordered


 


 CULTURE URINE [RM] Stat Lab  04/06/21 13:10 Received


 


 REFLEX LACTIC ACID YES OR NO [CHEM] Routine Lab  04/06/21 13:55 Received


 


 Sodium Chloride 0.9% [Normal Saline] 1,000 ml Med  04/06/21 13:15 Active





 IV ASDIRECTED   


 


 Sodium Chloride 0.9% [Normal Saline] 1,000 ml Med  04/06/21 13:56 Active





 IV ONETIME   


 


 Sodium Chloride 0.9% [Saline Flush] Med  04/06/21 13:07 Active





 10 ml FLUSH ASDIRECTED PRN   


 


 Blood Culture x2 Reflex Set [OM.PC] Stat Oth  04/06/21 13:04 Ordered


 


 Peripheral IV Insertion Adult [OM.PC] Routine Oth  04/06/21 13:07 Ordered








                                Medication Orders





Sodium Chloride (Normal Saline)  1,000 mls @ 125 mls/hr IV ASDIRECTED ANA


   Last Infusion: 04/06/21 14:00  Dose: 999 mls/hr


   Documented by: MADDIE


   Admin: 04/06/21 13:11  Dose: 125 mls/hr


   Documented by: MADDIE


Sodium Chloride (Normal Saline)  1,000 mls @ 1,000 mls/hr IV ONETIME ONE


   Stop: 04/06/21 14:55


Sodium Chloride (Sodium Chloride 0.9% 10 Ml Syringe)  10 ml FLUSH ASDIRECTED PRN


   PRN Reason: Keep Vein Open


   Last Admin: 04/06/21 13:11  Dose: 10 ml


   Documented by: MADDIE








Labs: 


                                Laboratory Tests











  04/06/21 04/06/21 04/06/21 Range/Units





  12:55 12:55 12:55 


 


WBC  8.35    (3.98-10.04)  K/mm3


 


RBC  4.54    (3.98-5.22)  M/mm3


 


Hgb  13.4  D    (11.2-15.7)  gm/dl


 


Hct  43.5    (34.1-44.9)  %


 


MCV  95.8 H D    (79.4-94.8)  fl


 


MCH  29.5    (25.6-32.2)  pg


 


MCHC  30.8 L    (32.2-35.5)  g/dl


 


RDW Std Deviation  50.5 H    (36.4-46.3)  fL


 


Plt Count  345  D    (182-369)  K/mm3


 


MPV  11.9    (9.4-12.3)  fl


 


Neut % (Auto)  65.6    (34.0-71.1)  %


 


Lymph % (Auto)  18.8 L    (19.3-51.7)  %


 


Mono % (Auto)  14.6 H    (4.7-12.5)  %


 


Eos % (Auto)  0 L    (0.7-5.8)  


 


Baso % (Auto)  0.4    (0.1-1.2)  %


 


Neut # (Auto)  5.48    (1.56-6.13)  K/mm3


 


Lymph # (Auto)  1.57    (1.18-3.74)  K/mm3


 


Mono # (Auto)  1.22 H    (0.24-0.36)  K/mm3


 


Eos # (Auto)  0.00 L    (0.04-0.36)  K/mm3


 


Baso # (Auto)  0.03    (0.01-0.08)  K/mm3


 


Sodium   145   (136-145)  mEq/L


 


Potassium   4.2   (3.5-5.1)  mEq/L


 


Chloride   108 H   ()  mEq/L


 


Carbon Dioxide   20 L   (21-32)  mEq/L


 


Anion Gap   21.2 H   (5-15)  


 


BUN   43 H D   (7-18)  mg/dL


 


Creatinine   2.2 H   (0.55-1.02)  mg/dL


 


Est Cr Clr Drug Dosing   15.26   mL/min


 


Estimated GFR (MDRD)   21   (>60)  mL/min


 


BUN/Creatinine Ratio   19.5 H   (14-18)  


 


Glucose   489 H   ()  mg/dL


 


Lactic Acid     (0.4-2.0)  mmol/L


 


Calcium   9.3   (8.5-10.1)  mg/dL


 


Total Bilirubin   1.1 H   (0.2-1.0)  mg/dL


 


AST   30   (15-37)  U/L


 


ALT   60 H   (14-59)  U/L


 


Alkaline Phosphatase   104   ()  U/L


 


C-Reactive Protein   19.2 H*   (<1.0)  mg/dL


 


NT-Pro-B Natriuret Pep    8711 H  (0-450)  pg/mL


 


Total Protein   8.5 H   (6.4-8.2)  g/dl


 


Albumin   3.4   (3.4-5.0)  g/dl


 


Globulin   5.1   gm/dL


 


Albumin/Globulin Ratio   0.7 L   (1-2)  


 


Urine Color     (Yellow)  


 


Urine Appearance     (Clear)  


 


Urine pH     (5.0-8.0)  


 


Ur Specific Gravity     (1.005-1.030)  


 


Urine Protein     (Negative)  


 


Urine Glucose (UA)     (Negative)  


 


Urine Ketones     (Negative)  


 


Urine Occult Blood     (Negative)  


 


Urine Nitrite     (Negative)  


 


Urine Bilirubin     (Negative)  


 


Urine Urobilinogen     (0.2-1.0)  


 


Ur Leukocyte Esterase     (Negative)  


 


Urine RBC     (0-5)  /hpf


 


Urine WBC     (0-5)  /hpf


 


Ur Squamous Epith Cells     (0-5)  /hpf


 


Amorphous Sediment     (NOT SEEN)  /hpf


 


Urine Bacteria     (FEW)  /hpf


 


Urine Mucus     (FEW)  /hpf


 


Influenza Type A RNA     (NEGATIVE)  


 


Influenza Type B RNA     (NEGATIVE)  


 


SARS-CoV-2 RNA (YASH)     (NEGATIVE)  














  04/06/21 04/06/21 04/06/21 Range/Units





  12:55 13:08 13:10 


 


WBC     (3.98-10.04)  K/mm3


 


RBC     (3.98-5.22)  M/mm3


 


Hgb     (11.2-15.7)  gm/dl


 


Hct     (34.1-44.9)  %


 


MCV     (79.4-94.8)  fl


 


MCH     (25.6-32.2)  pg


 


MCHC     (32.2-35.5)  g/dl


 


RDW Std Deviation     (36.4-46.3)  fL


 


Plt Count     (182-369)  K/mm3


 


MPV     (9.4-12.3)  fl


 


Neut % (Auto)     (34.0-71.1)  %


 


Lymph % (Auto)     (19.3-51.7)  %


 


Mono % (Auto)     (4.7-12.5)  %


 


Eos % (Auto)     (0.7-5.8)  


 


Baso % (Auto)     (0.1-1.2)  %


 


Neut # (Auto)     (1.56-6.13)  K/mm3


 


Lymph # (Auto)     (1.18-3.74)  K/mm3


 


Mono # (Auto)     (0.24-0.36)  K/mm3


 


Eos # (Auto)     (0.04-0.36)  K/mm3


 


Baso # (Auto)     (0.01-0.08)  K/mm3


 


Sodium     (136-145)  mEq/L


 


Potassium     (3.5-5.1)  mEq/L


 


Chloride     ()  mEq/L


 


Carbon Dioxide     (21-32)  mEq/L


 


Anion Gap     (5-15)  


 


BUN     (7-18)  mg/dL


 


Creatinine     (0.55-1.02)  mg/dL


 


Est Cr Clr Drug Dosing     mL/min


 


Estimated GFR (MDRD)     (>60)  mL/min


 


BUN/Creatinine Ratio     (14-18)  


 


Glucose     ()  mg/dL


 


Lactic Acid  5.2 H*    (0.4-2.0)  mmol/L


 


Calcium     (8.5-10.1)  mg/dL


 


Total Bilirubin     (0.2-1.0)  mg/dL


 


AST     (15-37)  U/L


 


ALT     (14-59)  U/L


 


Alkaline Phosphatase     ()  U/L


 


C-Reactive Protein     (<1.0)  mg/dL


 


NT-Pro-B Natriuret Pep     (0-450)  pg/mL


 


Total Protein     (6.4-8.2)  g/dl


 


Albumin     (3.4-5.0)  g/dl


 


Globulin     gm/dL


 


Albumin/Globulin Ratio     (1-2)  


 


Urine Color    Dark yellow  (Yellow)  


 


Urine Appearance    Cloudy H  (Clear)  


 


Urine pH    6.0  (5.0-8.0)  


 


Ur Specific Gravity    > or = 1.030  (1.005-1.030)  


 


Urine Protein    3+ H  (Negative)  


 


Urine Glucose (UA)    2+ H  (Negative)  


 


Urine Ketones    Trace H  (Negative)  


 


Urine Occult Blood    3+ H  (Negative)  


 


Urine Nitrite    Negative  (Negative)  


 


Urine Bilirubin    1+ H  (Negative)  


 


Urine Urobilinogen    1.0  (0.2-1.0)  


 


Ur Leukocyte Esterase    3+ H  (Negative)  


 


Urine RBC    20-30 H  (0-5)  /hpf


 


Urine WBC    >100 H  (0-5)  /hpf


 


Ur Squamous Epith Cells    0-5  (0-5)  /hpf


 


Amorphous Sediment    Moderate H  (NOT SEEN)  /hpf


 


Urine Bacteria    Many H  (FEW)  /hpf


 


Urine Mucus    Not seen  (FEW)  /hpf


 


Influenza Type A RNA   Negative   (NEGATIVE)  


 


Influenza Type B RNA   Negative   (NEGATIVE)  


 


SARS-CoV-2 RNA (YASH)   Negative   (NEGATIVE)  











Meds: 


Medications











Generic Name Dose Route Start Last Admin





  Trade Name Freq  PRN Reason Stop Dose Admin


 


Sodium Chloride  1,000 mls @ 125 mls/hr  04/06/21 13:15  04/06/21 14:00





  Normal Saline  IV   999 mls/hr





  ASDIRECTED ANA   Infusion


 


Sodium Chloride  1,000 mls @ 1,000 mls/hr  04/06/21 13:56 





  Normal Saline  IV  04/06/21 14:55 





  ONETIME ONE  


 


Sodium Chloride  10 ml  04/06/21 13:07  04/06/21 13:11





  Sodium Chloride 0.9% 10 Ml Syringe  FLUSH   10 ml





  ASDIRECTED PRN   Administration





  Keep Vein Open  














Discontinued Medications














Generic Name Dose Route Start Last Admin





  Trade Name Freq  PRN Reason Stop Dose Admin


 


Acetaminophen  650 mg  04/06/21 14:29 





  Acetaminophen 650 Mg Supp  RECTAL  04/06/21 14:30 





  NOW ONE  


 


Ceftriaxone Sodium 2 gm/  100 mls @ 200 mls/hr  04/06/21 13:05  04/06/21 13:11





  Sodium Chloride  IV  04/06/21 13:34  200 mls/hr





  ONETIME ONE   Administration














- Re-Assessments/Exams


Free Text/Narrative Re-Assessment/Exam: 





04/06/21 14:35


I am worried the patient is septic.  I ordered and IV NS 1L bolus, CXR, UA, 

labs, blood cultures, lactic acid, rocephin 2 grams IV and tylenol 650mg ME.





Her CBC is negative.  Her anion gap is elevated at 21.2.  Her creatinine is 

elevated 2.2.  Her glucose is elevated at 489.  Her lactic acid is elevated at 

5.2.  Her total bilis is elevated at 1.1.  Her ALT is elevated at 60.  Her CRP 

is elevated at 19.2.  Her BNP is elevated at 8,711.  Her UA shows a UTI.  She 

has a UTI and sepsis.  She has severe sepsis.  Antibiotics have been started 

after cultures.  I ordered a 30ml/kg bolus.  I called Dr Carias and he agreed 

to the admission.





04/06/21 14:42


After the fluid bolus I did a sepsis focused exam and the patient responded well

 to fluids.  Her BP has been good the whole time.  I will be repeating her 

lactic acid.





Departure





- Departure


Time of Disposition: 14:45


Disposition: Admitted As Inpatient 66


Condition: Serious


Clinical Impression: 


 Renal insufficiency





UTI (urinary tract infection)


Qualifiers:


 Urinary tract infection type: acute cystitis Hematuria presence: without 

hematuria Qualified Code(s): N30.00 - Acute cystitis without hematuria





Sepsis


Qualifiers:


 Sepsis type: sepsis due to unspecified organism Sepsis acute organ dysfunction 

status: without acute organ dysfunction Qualified Code(s): A41.9 - Sepsis, 

unspecified organism








- Discharge Information





Sepsis Event Note (ED)





- Evaluation


Sepsis Screening Result: Possible Sepsis Risk





- Focused Exam


Vital Signs: 


                                   Vital Signs











  Temp Pulse Resp BP Pulse Ox


 


 04/06/21 12:42  102.4 F H  110 H  36 H  146/80 H  91 L














- My Orders


Last 24 Hours: 


My Active Orders





04/06/21 13:04


CULTURE BLOOD [BC] Stat 


CULTURE BLOOD [BC] Stat 


Blood Culture x2 Reflex Set [OM.PC] Stat 





04/06/21 13:07


Peripheral IV Care [RC] .AS DIRECTED 


Sodium Chloride 0.9% [Saline Flush]   10 ml FLUSH ASDIRECTED PRN 


Peripheral IV Insertion Adult [OM.PC] Routine 





04/06/21 13:10


Insert Marroquin Catheter [Insert Urinary Catheter] [OM.PC] Stat 


Urinary Catheter Assessment [RC] ASDIRECTED 


CULTURE URINE [RM] Stat 





04/06/21 13:15


Sodium Chloride 0.9% [Normal Saline] 1,000 ml IV ASDIRECTED 





04/06/21 13:55


REFLEX LACTIC ACID YES OR NO [CHEM] Routine 





04/06/21 13:56


Sodium Chloride 0.9% [Normal Saline] 1,000 ml IV ONETIME 














- Assessment/Plan


Last 24 Hours: 


My Active Orders





04/06/21 13:04


CULTURE BLOOD [BC] Stat 


CULTURE BLOOD [BC] Stat 


Blood Culture x2 Reflex Set [OM.PC] Stat 





04/06/21 13:07


Peripheral IV Care [RC] .AS DIRECTED 


Sodium Chloride 0.9% [Saline Flush]   10 ml FLUSH ASDIRECTED PRN 


Peripheral IV Insertion Adult [OM.PC] Routine 





04/06/21 13:10


Insert Marroquin Catheter [Insert Urinary Catheter] [OM.PC] Stat 


Urinary Catheter Assessment [RC] ASDIRECTED 


CULTURE URINE [RM] Stat 





04/06/21 13:15


Sodium Chloride 0.9% [Normal Saline] 1,000 ml IV ASDIRECTED 





04/06/21 13:55


REFLEX LACTIC ACID YES OR NO [CHEM] Routine 





04/06/21 13:56


Sodium Chloride 0.9% [Normal Saline] 1,000 ml IV ONETIME

## 2021-04-07 RX ADMIN — INSULIN LISPRO SCH UNIT: 100 INJECTION, SOLUTION INTRAVENOUS; SUBCUTANEOUS at 18:16

## 2021-04-07 RX ADMIN — HEPARIN SODIUM SCH UNITS: 5000 INJECTION, SOLUTION INTRAVENOUS; SUBCUTANEOUS at 18:08

## 2021-04-07 RX ADMIN — INSULIN LISPRO SCH UNIT: 100 INJECTION, SOLUTION INTRAVENOUS; SUBCUTANEOUS at 12:16

## 2021-04-07 RX ADMIN — INSULIN LISPRO SCH: 100 INJECTION, SOLUTION INTRAVENOUS; SUBCUTANEOUS at 08:18

## 2021-04-07 RX ADMIN — INSULIN LISPRO SCH UNIT: 100 INJECTION, SOLUTION INTRAVENOUS; SUBCUTANEOUS at 06:12

## 2021-04-07 RX ADMIN — INSULIN LISPRO SCH UNIT: 100 INJECTION, SOLUTION INTRAVENOUS; SUBCUTANEOUS at 21:24

## 2021-04-07 RX ADMIN — HEPARIN SODIUM SCH UNITS: 5000 INJECTION, SOLUTION INTRAVENOUS; SUBCUTANEOUS at 06:12

## 2021-04-07 NOTE — PCM.PN
- General Info


Date of Service: 04/07/21


Admission Dx/Problem (Free Text): 


                           Admission Diagnosis/Problem





Admission Diagnosis/Problem      Sepsis








Subjective Update: 





Improved overnight.  She had one low blood pressure which appears to be an 

outlier.  After fluids and antibiotics her lactic acid did decrease to 2.0.  

Blood sugars have improved down to mid 100s to 200s.  Anion gap has dropped to 

15.6 and kidney function has improved.  Patient appears to be more awake and al

ert today.  





- Review of Systems


General: Reports: Other (Nonverbal)





- Patient Data


Vitals - Most Recent: 


                                Last Vital Signs











Temp  97.2 F   04/07/21 12:00


 


Pulse  80   04/07/21 10:00


 


Resp  15   04/07/21 12:00


 


BP  84/52 L  04/07/21 12:00


 


Pulse Ox  91 L  04/07/21 12:00











Weight - Most Recent: 131 lb 11.196 oz


I&O - Last 24 Hours: 


                                 Intake & Output











 04/06/21 04/07/21 04/07/21





 22:59 06:59 14:59


 


Intake Total  2041 1300


 


Output Total 305 580 875


 


Balance -305 1461 425











Lab Results Last 24 Hours: 


                         Laboratory Results - last 24 hr











  04/06/21 04/06/21 04/06/21 Range/Units





  12:55 12:55 12:55 


 


WBC  8.35    (3.98-10.04)  K/mm3


 


RBC  4.54    (3.98-5.22)  M/mm3


 


Hgb  13.4  D    (11.2-15.7)  gm/dl


 


Hct  43.5    (34.1-44.9)  %


 


MCV  95.8 H D    (79.4-94.8)  fl


 


MCH  29.5    (25.6-32.2)  pg


 


MCHC  30.8 L    (32.2-35.5)  g/dl


 


RDW Std Deviation  50.5 H    (36.4-46.3)  fL


 


Plt Count  345  D    (182-369)  K/mm3


 


MPV  11.9    (9.4-12.3)  fl


 


Neut % (Auto)  65.6    (34.0-71.1)  %


 


Lymph % (Auto)  18.8 L    (19.3-51.7)  %


 


Mono % (Auto)  14.6 H    (4.7-12.5)  %


 


Eos % (Auto)  0 L    (0.7-5.8)  


 


Baso % (Auto)  0.4    (0.1-1.2)  %


 


Neut # (Auto)  5.48    (1.56-6.13)  K/mm3


 


Lymph # (Auto)  1.57    (1.18-3.74)  K/mm3


 


Mono # (Auto)  1.22 H    (0.24-0.36)  K/mm3


 


Eos # (Auto)  0.00 L    (0.04-0.36)  K/mm3


 


Baso # (Auto)  0.03    (0.01-0.08)  K/mm3


 


Sodium   145   (136-145)  mEq/L


 


Potassium   4.2   (3.5-5.1)  mEq/L


 


Chloride   108 H   ()  mEq/L


 


Carbon Dioxide   20 L   (21-32)  mEq/L


 


Anion Gap   21.2 H   (5-15)  


 


BUN   43 H D   (7-18)  mg/dL


 


Creatinine   2.2 H   (0.55-1.02)  mg/dL


 


Est Cr Clr Drug Dosing   15.26   mL/min


 


Estimated GFR (MDRD)   21   (>60)  mL/min


 


BUN/Creatinine Ratio   19.5 H   (14-18)  


 


Glucose   489 H   ()  mg/dL


 


POC Glucose     ()  mg/dL


 


Hemoglobin A1c    ( - 5.6) %


 


Lactic Acid     (0.4-2.0)  mmol/L


 


Calcium   9.3   (8.5-10.1)  mg/dL


 


Magnesium     (1.8-2.4)  mg/dl


 


Total Bilirubin   1.1 H   (0.2-1.0)  mg/dL


 


AST   30   (15-37)  U/L


 


ALT   60 H   (14-59)  U/L


 


Alkaline Phosphatase   104   ()  U/L


 


C-Reactive Protein   19.2 H*   (<1.0)  mg/dL


 


NT-Pro-B Natriuret Pep    8711 H  (0-450)  pg/mL


 


Total Protein   8.5 H   (6.4-8.2)  g/dl


 


Albumin   3.4   (3.4-5.0)  g/dl


 


Globulin   5.1   gm/dL


 


Albumin/Globulin Ratio   0.7 L   (1-2)  


 


TSH 3rd Generation     (0.358-3.74)  uIU/mL


 


Urine Color     (Yellow)  


 


Urine Appearance     (Clear)  


 


Urine pH     (5.0-8.0)  


 


Ur Specific Gravity     (1.005-1.030)  


 


Urine Protein     (Negative)  


 


Urine Glucose (UA)     (Negative)  


 


Urine Ketones     (Negative)  


 


Urine Occult Blood     (Negative)  


 


Urine Nitrite     (Negative)  


 


Urine Bilirubin     (Negative)  


 


Urine Urobilinogen     (0.2-1.0)  


 


Ur Leukocyte Esterase     (Negative)  


 


Urine RBC     (0-5)  /hpf


 


Urine WBC     (0-5)  /hpf


 


Ur Squamous Epith Cells     (0-5)  /hpf


 


Amorphous Sediment     (NOT SEEN)  /hpf


 


Urine Bacteria     (FEW)  /hpf


 


Urine Mucus     (FEW)  /hpf


 


Ketones     (0.0-0.3)  mM


 


Influenza Type A RNA     (NEGATIVE)  


 


Influenza Type B RNA     (NEGATIVE)  


 


SARS-CoV-2 RNA (YASH)     (NEGATIVE)  














  04/06/21 04/06/21 04/06/21 Range/Units





  12:55 12:55 12:55 


 


WBC     (3.98-10.04)  K/mm3


 


RBC     (3.98-5.22)  M/mm3


 


Hgb     (11.2-15.7)  gm/dl


 


Hct     (34.1-44.9)  %


 


MCV     (79.4-94.8)  fl


 


MCH     (25.6-32.2)  pg


 


MCHC     (32.2-35.5)  g/dl


 


RDW Std Deviation     (36.4-46.3)  fL


 


Plt Count     (182-369)  K/mm3


 


MPV     (9.4-12.3)  fl


 


Neut % (Auto)     (34.0-71.1)  %


 


Lymph % (Auto)     (19.3-51.7)  %


 


Mono % (Auto)     (4.7-12.5)  %


 


Eos % (Auto)     (0.7-5.8)  


 


Baso % (Auto)     (0.1-1.2)  %


 


Neut # (Auto)     (1.56-6.13)  K/mm3


 


Lymph # (Auto)     (1.18-3.74)  K/mm3


 


Mono # (Auto)     (0.24-0.36)  K/mm3


 


Eos # (Auto)     (0.04-0.36)  K/mm3


 


Baso # (Auto)     (0.01-0.08)  K/mm3


 


Sodium    Cancelled  (136-145)  mEq/L


 


Potassium    Cancelled  (3.5-5.1)  mEq/L


 


Chloride    Cancelled  ()  mEq/L


 


Carbon Dioxide    Cancelled  (21-32)  mEq/L


 


Anion Gap    Cancelled  (5-15)  


 


BUN    Cancelled  (7-18)  mg/dL


 


Creatinine    Cancelled  (0.55-1.02)  mg/dL


 


Est Cr Clr Drug Dosing    Cancelled  mL/min


 


Estimated GFR (MDRD)    Cancelled  (>60)  mL/min


 


BUN/Creatinine Ratio    Cancelled  (14-18)  


 


Glucose    Cancelled  ()  mg/dL


 


POC Glucose     ()  mg/dL


 


Hemoglobin A1c    ( - 5.6) %


 


Lactic Acid  5.2 H*    (0.4-2.0)  mmol/L


 


Calcium    Cancelled  (8.5-10.1)  mg/dL


 


Magnesium     (1.8-2.4)  mg/dl


 


Total Bilirubin     (0.2-1.0)  mg/dL


 


AST     (15-37)  U/L


 


ALT     (14-59)  U/L


 


Alkaline Phosphatase     ()  U/L


 


C-Reactive Protein     (<1.0)  mg/dL


 


NT-Pro-B Natriuret Pep     (0-450)  pg/mL


 


Total Protein     (6.4-8.2)  g/dl


 


Albumin     (3.4-5.0)  g/dl


 


Globulin     gm/dL


 


Albumin/Globulin Ratio     (1-2)  


 


TSH 3rd Generation    3.245  (0.358-3.74)  uIU/mL


 


Urine Color     (Yellow)  


 


Urine Appearance     (Clear)  


 


Urine pH     (5.0-8.0)  


 


Ur Specific Gravity     (1.005-1.030)  


 


Urine Protein     (Negative)  


 


Urine Glucose (UA)     (Negative)  


 


Urine Ketones     (Negative)  


 


Urine Occult Blood     (Negative)  


 


Urine Nitrite     (Negative)  


 


Urine Bilirubin     (Negative)  


 


Urine Urobilinogen     (0.2-1.0)  


 


Ur Leukocyte Esterase     (Negative)  


 


Urine RBC     (0-5)  /hpf


 


Urine WBC     (0-5)  /hpf


 


Ur Squamous Epith Cells     (0-5)  /hpf


 


Amorphous Sediment     (NOT SEEN)  /hpf


 


Urine Bacteria     (FEW)  /hpf


 


Urine Mucus     (FEW)  /hpf


 


Ketones   0.67   (0.0-0.3)  mM


 


Influenza Type A RNA     (NEGATIVE)  


 


Influenza Type B RNA     (NEGATIVE)  


 


SARS-CoV-2 RNA (YASH)     (NEGATIVE)  














  04/06/21 04/06/21 04/06/21 Range/Units





  12:55 13:08 13:10 


 


WBC     (3.98-10.04)  K/mm3


 


RBC     (3.98-5.22)  M/mm3


 


Hgb     (11.2-15.7)  gm/dl


 


Hct     (34.1-44.9)  %


 


MCV     (79.4-94.8)  fl


 


MCH     (25.6-32.2)  pg


 


MCHC     (32.2-35.5)  g/dl


 


RDW Std Deviation     (36.4-46.3)  fL


 


Plt Count     (182-369)  K/mm3


 


MPV     (9.4-12.3)  fl


 


Neut % (Auto)     (34.0-71.1)  %


 


Lymph % (Auto)     (19.3-51.7)  %


 


Mono % (Auto)     (4.7-12.5)  %


 


Eos % (Auto)     (0.7-5.8)  


 


Baso % (Auto)     (0.1-1.2)  %


 


Neut # (Auto)     (1.56-6.13)  K/mm3


 


Lymph # (Auto)     (1.18-3.74)  K/mm3


 


Mono # (Auto)     (0.24-0.36)  K/mm3


 


Eos # (Auto)     (0.04-0.36)  K/mm3


 


Baso # (Auto)     (0.01-0.08)  K/mm3


 


Sodium     (136-145)  mEq/L


 


Potassium     (3.5-5.1)  mEq/L


 


Chloride     ()  mEq/L


 


Carbon Dioxide     (21-32)  mEq/L


 


Anion Gap     (5-15)  


 


BUN     (7-18)  mg/dL


 


Creatinine     (0.55-1.02)  mg/dL


 


Est Cr Clr Drug Dosing     mL/min


 


Estimated GFR (MDRD)     (>60)  mL/min


 


BUN/Creatinine Ratio     (14-18)  


 


Glucose     ()  mg/dL


 


POC Glucose     ()  mg/dL


 


Hemoglobin A1c  8.5 H   ( - 5.6) %


 


Lactic Acid     (0.4-2.0)  mmol/L


 


Calcium     (8.5-10.1)  mg/dL


 


Magnesium     (1.8-2.4)  mg/dl


 


Total Bilirubin     (0.2-1.0)  mg/dL


 


AST     (15-37)  U/L


 


ALT     (14-59)  U/L


 


Alkaline Phosphatase     ()  U/L


 


C-Reactive Protein     (<1.0)  mg/dL


 


NT-Pro-B Natriuret Pep     (0-450)  pg/mL


 


Total Protein     (6.4-8.2)  g/dl


 


Albumin     (3.4-5.0)  g/dl


 


Globulin     gm/dL


 


Albumin/Globulin Ratio     (1-2)  


 


TSH 3rd Generation     (0.358-3.74)  uIU/mL


 


Urine Color    Dark yellow  (Yellow)  


 


Urine Appearance    Cloudy H  (Clear)  


 


Urine pH    6.0  (5.0-8.0)  


 


Ur Specific Gravity    > or = 1.030  (1.005-1.030)  


 


Urine Protein    3+ H  (Negative)  


 


Urine Glucose (UA)    2+ H  (Negative)  


 


Urine Ketones    Trace H  (Negative)  


 


Urine Occult Blood    3+ H  (Negative)  


 


Urine Nitrite    Negative  (Negative)  


 


Urine Bilirubin    1+ H  (Negative)  


 


Urine Urobilinogen    1.0  (0.2-1.0)  


 


Ur Leukocyte Esterase    3+ H  (Negative)  


 


Urine RBC    20-30 H  (0-5)  /hpf


 


Urine WBC    >100 H  (0-5)  /hpf


 


Ur Squamous Epith Cells    0-5  (0-5)  /hpf


 


Amorphous Sediment    Moderate H  (NOT SEEN)  /hpf


 


Urine Bacteria    Many H  (FEW)  /hpf


 


Urine Mucus    Not seen  (FEW)  /hpf


 


Ketones     (0.0-0.3)  mM


 


Influenza Type A RNA   Negative   (NEGATIVE)  


 


Influenza Type B RNA   Negative   (NEGATIVE)  


 


SARS-CoV-2 RNA (YASH)   Negative   (NEGATIVE)  














  04/06/21 04/06/21 04/06/21 Range/Units





  16:02 19:28 19:34 


 


WBC     (3.98-10.04)  K/mm3


 


RBC     (3.98-5.22)  M/mm3


 


Hgb     (11.2-15.7)  gm/dl


 


Hct     (34.1-44.9)  %


 


MCV     (79.4-94.8)  fl


 


MCH     (25.6-32.2)  pg


 


MCHC     (32.2-35.5)  g/dl


 


RDW Std Deviation     (36.4-46.3)  fL


 


Plt Count     (182-369)  K/mm3


 


MPV     (9.4-12.3)  fl


 


Neut % (Auto)     (34.0-71.1)  %


 


Lymph % (Auto)     (19.3-51.7)  %


 


Mono % (Auto)     (4.7-12.5)  %


 


Eos % (Auto)     (0.7-5.8)  


 


Baso % (Auto)     (0.1-1.2)  %


 


Neut # (Auto)     (1.56-6.13)  K/mm3


 


Lymph # (Auto)     (1.18-3.74)  K/mm3


 


Mono # (Auto)     (0.24-0.36)  K/mm3


 


Eos # (Auto)     (0.04-0.36)  K/mm3


 


Baso # (Auto)     (0.01-0.08)  K/mm3


 


Sodium     (136-145)  mEq/L


 


Potassium     (3.5-5.1)  mEq/L


 


Chloride     ()  mEq/L


 


Carbon Dioxide     (21-32)  mEq/L


 


Anion Gap     (5-15)  


 


BUN     (7-18)  mg/dL


 


Creatinine     (0.55-1.02)  mg/dL


 


Est Cr Clr Drug Dosing     mL/min


 


Estimated GFR (MDRD)     (>60)  mL/min


 


BUN/Creatinine Ratio     (14-18)  


 


Glucose     ()  mg/dL


 


POC Glucose    376 H  ()  mg/dL


 


Hemoglobin A1c    ( - 5.6) %


 


Lactic Acid  2.8 H*  4.1 H*   (0.4-2.0)  mmol/L


 


Calcium     (8.5-10.1)  mg/dL


 


Magnesium     (1.8-2.4)  mg/dl


 


Total Bilirubin     (0.2-1.0)  mg/dL


 


AST     (15-37)  U/L


 


ALT     (14-59)  U/L


 


Alkaline Phosphatase     ()  U/L


 


C-Reactive Protein     (<1.0)  mg/dL


 


NT-Pro-B Natriuret Pep     (0-450)  pg/mL


 


Total Protein     (6.4-8.2)  g/dl


 


Albumin     (3.4-5.0)  g/dl


 


Globulin     gm/dL


 


Albumin/Globulin Ratio     (1-2)  


 


TSH 3rd Generation     (0.358-3.74)  uIU/mL


 


Urine Color     (Yellow)  


 


Urine Appearance     (Clear)  


 


Urine pH     (5.0-8.0)  


 


Ur Specific Gravity     (1.005-1.030)  


 


Urine Protein     (Negative)  


 


Urine Glucose (UA)     (Negative)  


 


Urine Ketones     (Negative)  


 


Urine Occult Blood     (Negative)  


 


Urine Nitrite     (Negative)  


 


Urine Bilirubin     (Negative)  


 


Urine Urobilinogen     (0.2-1.0)  


 


Ur Leukocyte Esterase     (Negative)  


 


Urine RBC     (0-5)  /hpf


 


Urine WBC     (0-5)  /hpf


 


Ur Squamous Epith Cells     (0-5)  /hpf


 


Amorphous Sediment     (NOT SEEN)  /hpf


 


Urine Bacteria     (FEW)  /hpf


 


Urine Mucus     (FEW)  /hpf


 


Ketones     (0.0-0.3)  mM


 


Influenza Type A RNA     (NEGATIVE)  


 


Influenza Type B RNA     (NEGATIVE)  


 


SARS-CoV-2 RNA (YASH)     (NEGATIVE)  














  04/06/21 04/06/21 04/06/21 Range/Units





  22:56 23:03 23:03 


 


WBC     (3.98-10.04)  K/mm3


 


RBC     (3.98-5.22)  M/mm3


 


Hgb     (11.2-15.7)  gm/dl


 


Hct     (34.1-44.9)  %


 


MCV     (79.4-94.8)  fl


 


MCH     (25.6-32.2)  pg


 


MCHC     (32.2-35.5)  g/dl


 


RDW Std Deviation     (36.4-46.3)  fL


 


Plt Count     (182-369)  K/mm3


 


MPV     (9.4-12.3)  fl


 


Neut % (Auto)     (34.0-71.1)  %


 


Lymph % (Auto)     (19.3-51.7)  %


 


Mono % (Auto)     (4.7-12.5)  %


 


Eos % (Auto)     (0.7-5.8)  


 


Baso % (Auto)     (0.1-1.2)  %


 


Neut # (Auto)     (1.56-6.13)  K/mm3


 


Lymph # (Auto)     (1.18-3.74)  K/mm3


 


Mono # (Auto)     (0.24-0.36)  K/mm3


 


Eos # (Auto)     (0.04-0.36)  K/mm3


 


Baso # (Auto)     (0.01-0.08)  K/mm3


 


Sodium    151 H  (136-145)  mEq/L


 


Potassium    3.3 L  (3.5-5.1)  mEq/L


 


Chloride    116 H  ()  mEq/L


 


Carbon Dioxide    20 L  (21-32)  mEq/L


 


Anion Gap    18.3 H  (5-15)  


 


BUN    36 H  (7-18)  mg/dL


 


Creatinine    1.5 H  (0.55-1.02)  mg/dL


 


Est Cr Clr Drug Dosing    20.50  mL/min


 


Estimated GFR (MDRD)    33  (>60)  mL/min


 


BUN/Creatinine Ratio    24.0 H  (14-18)  


 


Glucose    268 H  ()  mg/dL


 


POC Glucose  309 H    ()  mg/dL


 


Hemoglobin A1c    ( - 5.6) %


 


Lactic Acid   2.7 H*   (0.4-2.0)  mmol/L


 


Calcium    7.5 L D  (8.5-10.1)  mg/dL


 


Magnesium     (1.8-2.4)  mg/dl


 


Total Bilirubin     (0.2-1.0)  mg/dL


 


AST     (15-37)  U/L


 


ALT     (14-59)  U/L


 


Alkaline Phosphatase     ()  U/L


 


C-Reactive Protein     (<1.0)  mg/dL


 


NT-Pro-B Natriuret Pep     (0-450)  pg/mL


 


Total Protein     (6.4-8.2)  g/dl


 


Albumin     (3.4-5.0)  g/dl


 


Globulin     gm/dL


 


Albumin/Globulin Ratio     (1-2)  


 


TSH 3rd Generation     (0.358-3.74)  uIU/mL


 


Urine Color     (Yellow)  


 


Urine Appearance     (Clear)  


 


Urine pH     (5.0-8.0)  


 


Ur Specific Gravity     (1.005-1.030)  


 


Urine Protein     (Negative)  


 


Urine Glucose (UA)     (Negative)  


 


Urine Ketones     (Negative)  


 


Urine Occult Blood     (Negative)  


 


Urine Nitrite     (Negative)  


 


Urine Bilirubin     (Negative)  


 


Urine Urobilinogen     (0.2-1.0)  


 


Ur Leukocyte Esterase     (Negative)  


 


Urine RBC     (0-5)  /hpf


 


Urine WBC     (0-5)  /hpf


 


Ur Squamous Epith Cells     (0-5)  /hpf


 


Amorphous Sediment     (NOT SEEN)  /hpf


 


Urine Bacteria     (FEW)  /hpf


 


Urine Mucus     (FEW)  /hpf


 


Ketones     (0.0-0.3)  mM


 


Influenza Type A RNA     (NEGATIVE)  


 


Influenza Type B RNA     (NEGATIVE)  


 


SARS-CoV-2 RNA (YASH)     (NEGATIVE)  














  04/07/21 04/07/21 04/07/21 Range/Units





  02:00 02:06 05:40 


 


WBC    6.38  (3.98-10.04)  K/mm3


 


RBC    3.57 L  (3.98-5.22)  M/mm3


 


Hgb    10.6 L D  (11.2-15.7)  gm/dl


 


Hct    34.5  (34.1-44.9)  %


 


MCV    96.6 H  (79.4-94.8)  fl


 


MCH    29.7  (25.6-32.2)  pg


 


MCHC    30.7 L  (32.2-35.5)  g/dl


 


RDW Std Deviation    48.9 H  (36.4-46.3)  fL


 


Plt Count    213  D  (182-369)  K/mm3


 


MPV    12.0  (9.4-12.3)  fl


 


Neut % (Auto)    62.7  (34.0-71.1)  %


 


Lymph % (Auto)    24.6  (19.3-51.7)  %


 


Mono % (Auto)    11.9  (4.7-12.5)  %


 


Eos % (Auto)    0.2 L  (0.7-5.8)  


 


Baso % (Auto)    0.3  (0.1-1.2)  %


 


Neut # (Auto)    4.00  (1.56-6.13)  K/mm3


 


Lymph # (Auto)    1.57  (1.18-3.74)  K/mm3


 


Mono # (Auto)    0.76 H  (0.24-0.36)  K/mm3


 


Eos # (Auto)    0.01 L  (0.04-0.36)  K/mm3


 


Baso # (Auto)    0.02  (0.01-0.08)  K/mm3


 


Sodium     (136-145)  mEq/L


 


Potassium     (3.5-5.1)  mEq/L


 


Chloride     ()  mEq/L


 


Carbon Dioxide     (21-32)  mEq/L


 


Anion Gap     (5-15)  


 


BUN     (7-18)  mg/dL


 


Creatinine     (0.55-1.02)  mg/dL


 


Est Cr Clr Drug Dosing     mL/min


 


Estimated GFR (MDRD)     (>60)  mL/min


 


BUN/Creatinine Ratio     (14-18)  


 


Glucose     ()  mg/dL


 


POC Glucose   208 H   ()  mg/dL


 


Hemoglobin A1c    ( - 5.6) %


 


Lactic Acid  2.0    (0.4-2.0)  mmol/L


 


Calcium     (8.5-10.1)  mg/dL


 


Magnesium     (1.8-2.4)  mg/dl


 


Total Bilirubin     (0.2-1.0)  mg/dL


 


AST     (15-37)  U/L


 


ALT     (14-59)  U/L


 


Alkaline Phosphatase     ()  U/L


 


C-Reactive Protein     (<1.0)  mg/dL


 


NT-Pro-B Natriuret Pep     (0-450)  pg/mL


 


Total Protein     (6.4-8.2)  g/dl


 


Albumin     (3.4-5.0)  g/dl


 


Globulin     gm/dL


 


Albumin/Globulin Ratio     (1-2)  


 


TSH 3rd Generation     (0.358-3.74)  uIU/mL


 


Urine Color     (Yellow)  


 


Urine Appearance     (Clear)  


 


Urine pH     (5.0-8.0)  


 


Ur Specific Gravity     (1.005-1.030)  


 


Urine Protein     (Negative)  


 


Urine Glucose (UA)     (Negative)  


 


Urine Ketones     (Negative)  


 


Urine Occult Blood     (Negative)  


 


Urine Nitrite     (Negative)  


 


Urine Bilirubin     (Negative)  


 


Urine Urobilinogen     (0.2-1.0)  


 


Ur Leukocyte Esterase     (Negative)  


 


Urine RBC     (0-5)  /hpf


 


Urine WBC     (0-5)  /hpf


 


Ur Squamous Epith Cells     (0-5)  /hpf


 


Amorphous Sediment     (NOT SEEN)  /hpf


 


Urine Bacteria     (FEW)  /hpf


 


Urine Mucus     (FEW)  /hpf


 


Ketones     (0.0-0.3)  mM


 


Influenza Type A RNA     (NEGATIVE)  


 


Influenza Type B RNA     (NEGATIVE)  


 


SARS-CoV-2 RNA (YASH)     (NEGATIVE)  














  04/07/21 04/07/21 04/07/21 Range/Units





  05:40 05:52 08:17 


 


WBC     (3.98-10.04)  K/mm3


 


RBC     (3.98-5.22)  M/mm3


 


Hgb     (11.2-15.7)  gm/dl


 


Hct     (34.1-44.9)  %


 


MCV     (79.4-94.8)  fl


 


MCH     (25.6-32.2)  pg


 


MCHC     (32.2-35.5)  g/dl


 


RDW Std Deviation     (36.4-46.3)  fL


 


Plt Count     (182-369)  K/mm3


 


MPV     (9.4-12.3)  fl


 


Neut % (Auto)     (34.0-71.1)  %


 


Lymph % (Auto)     (19.3-51.7)  %


 


Mono % (Auto)     (4.7-12.5)  %


 


Eos % (Auto)     (0.7-5.8)  


 


Baso % (Auto)     (0.1-1.2)  %


 


Neut # (Auto)     (1.56-6.13)  K/mm3


 


Lymph # (Auto)     (1.18-3.74)  K/mm3


 


Mono # (Auto)     (0.24-0.36)  K/mm3


 


Eos # (Auto)     (0.04-0.36)  K/mm3


 


Baso # (Auto)     (0.01-0.08)  K/mm3


 


Sodium  150 H    (136-145)  mEq/L


 


Potassium  3.6    (3.5-5.1)  mEq/L


 


Chloride  116 H    ()  mEq/L


 


Carbon Dioxide  22    (21-32)  mEq/L


 


Anion Gap  15.6 H    (5-15)  


 


BUN  31 H    (7-18)  mg/dL


 


Creatinine  1.3 H    (0.55-1.02)  mg/dL


 


Est Cr Clr Drug Dosing  23.66    mL/min


 


Estimated GFR (MDRD)  39    (>60)  mL/min


 


BUN/Creatinine Ratio  23.8 H    (14-18)  


 


Glucose  162 H    ()  mg/dL


 


POC Glucose   171 H  145 H  ()  mg/dL


 


Hemoglobin A1c    ( - 5.6) %


 


Lactic Acid     (0.4-2.0)  mmol/L


 


Calcium  7.7 L    (8.5-10.1)  mg/dL


 


Magnesium  2.0    (1.8-2.4)  mg/dl


 


Total Bilirubin  0.6    (0.2-1.0)  mg/dL


 


AST  27    (15-37)  U/L


 


ALT  41    (14-59)  U/L


 


Alkaline Phosphatase  71    ()  U/L


 


C-Reactive Protein  15.5 H*    (<1.0)  mg/dL


 


NT-Pro-B Natriuret Pep     (0-450)  pg/mL


 


Total Protein  6.5    (6.4-8.2)  g/dl


 


Albumin  2.5 L    (3.4-5.0)  g/dl


 


Globulin  4.0    gm/dL


 


Albumin/Globulin Ratio  0.6 L    (1-2)  


 


TSH 3rd Generation     (0.358-3.74)  uIU/mL


 


Urine Color     (Yellow)  


 


Urine Appearance     (Clear)  


 


Urine pH     (5.0-8.0)  


 


Ur Specific Gravity     (1.005-1.030)  


 


Urine Protein     (Negative)  


 


Urine Glucose (UA)     (Negative)  


 


Urine Ketones     (Negative)  


 


Urine Occult Blood     (Negative)  


 


Urine Nitrite     (Negative)  


 


Urine Bilirubin     (Negative)  


 


Urine Urobilinogen     (0.2-1.0)  


 


Ur Leukocyte Esterase     (Negative)  


 


Urine RBC     (0-5)  /hpf


 


Urine WBC     (0-5)  /hpf


 


Ur Squamous Epith Cells     (0-5)  /hpf


 


Amorphous Sediment     (NOT SEEN)  /hpf


 


Urine Bacteria     (FEW)  /hpf


 


Urine Mucus     (FEW)  /hpf


 


Ketones     (0.0-0.3)  mM


 


Influenza Type A RNA     (NEGATIVE)  


 


Influenza Type B RNA     (NEGATIVE)  


 


SARS-CoV-2 RNA (YASH)     (NEGATIVE)  














  04/07/21 Range/Units





  11:27 


 


WBC   (3.98-10.04)  K/mm3


 


RBC   (3.98-5.22)  M/mm3


 


Hgb   (11.2-15.7)  gm/dl


 


Hct   (34.1-44.9)  %


 


MCV   (79.4-94.8)  fl


 


MCH   (25.6-32.2)  pg


 


MCHC   (32.2-35.5)  g/dl


 


RDW Std Deviation   (36.4-46.3)  fL


 


Plt Count   (182-369)  K/mm3


 


MPV   (9.4-12.3)  fl


 


Neut % (Auto)   (34.0-71.1)  %


 


Lymph % (Auto)   (19.3-51.7)  %


 


Mono % (Auto)   (4.7-12.5)  %


 


Eos % (Auto)   (0.7-5.8)  


 


Baso % (Auto)   (0.1-1.2)  %


 


Neut # (Auto)   (1.56-6.13)  K/mm3


 


Lymph # (Auto)   (1.18-3.74)  K/mm3


 


Mono # (Auto)   (0.24-0.36)  K/mm3


 


Eos # (Auto)   (0.04-0.36)  K/mm3


 


Baso # (Auto)   (0.01-0.08)  K/mm3


 


Sodium   (136-145)  mEq/L


 


Potassium   (3.5-5.1)  mEq/L


 


Chloride   ()  mEq/L


 


Carbon Dioxide   (21-32)  mEq/L


 


Anion Gap   (5-15)  


 


BUN   (7-18)  mg/dL


 


Creatinine   (0.55-1.02)  mg/dL


 


Est Cr Clr Drug Dosing   mL/min


 


Estimated GFR (MDRD)   (>60)  mL/min


 


BUN/Creatinine Ratio   (14-18)  


 


Glucose   ()  mg/dL


 


POC Glucose  242 H  ()  mg/dL


 


Hemoglobin A1c  ( - 5.6) %


 


Lactic Acid   (0.4-2.0)  mmol/L


 


Calcium   (8.5-10.1)  mg/dL


 


Magnesium   (1.8-2.4)  mg/dl


 


Total Bilirubin   (0.2-1.0)  mg/dL


 


AST   (15-37)  U/L


 


ALT   (14-59)  U/L


 


Alkaline Phosphatase   ()  U/L


 


C-Reactive Protein   (<1.0)  mg/dL


 


NT-Pro-B Natriuret Pep   (0-450)  pg/mL


 


Total Protein   (6.4-8.2)  g/dl


 


Albumin   (3.4-5.0)  g/dl


 


Globulin   gm/dL


 


Albumin/Globulin Ratio   (1-2)  


 


TSH 3rd Generation   (0.358-3.74)  uIU/mL


 


Urine Color   (Yellow)  


 


Urine Appearance   (Clear)  


 


Urine pH   (5.0-8.0)  


 


Ur Specific Gravity   (1.005-1.030)  


 


Urine Protein   (Negative)  


 


Urine Glucose (UA)   (Negative)  


 


Urine Ketones   (Negative)  


 


Urine Occult Blood   (Negative)  


 


Urine Nitrite   (Negative)  


 


Urine Bilirubin   (Negative)  


 


Urine Urobilinogen   (0.2-1.0)  


 


Ur Leukocyte Esterase   (Negative)  


 


Urine RBC   (0-5)  /hpf


 


Urine WBC   (0-5)  /hpf


 


Ur Squamous Epith Cells   (0-5)  /hpf


 


Amorphous Sediment   (NOT SEEN)  /hpf


 


Urine Bacteria   (FEW)  /hpf


 


Urine Mucus   (FEW)  /hpf


 


Ketones   (0.0-0.3)  mM


 


Influenza Type A RNA   (NEGATIVE)  


 


Influenza Type B RNA   (NEGATIVE)  


 


SARS-CoV-2 RNA (YASH)   (NEGATIVE)  











Chris Results Last 24 Hours: 


                                  Microbiology











 04/06/21 13:03 Aerobic Blood Culture - Preliminary





 Blood - Venous - Lab Draw    NO GROWTH AFTER 1 DAY





 Anaerobic Blood Culture - Preliminary





    NO GROWTH AFTER 1 DAY


 


 04/06/21 12:55 Aerobic Blood Culture - Preliminary





 Blood - Venous    NO GROWTH AFTER 1 DAY





 Anaerobic Blood Culture - Preliminary





    NO GROWTH AFTER 1 DAY


 


 04/06/21 13:10 Urine Culture - Preliminary





 Urine, Quick Cath (In-Out)    Gram Negative Rods











Med Orders - Current: 


                               Current Medications





Acetaminophen (Acetaminophen 325 Mg Tab)  650 mg PO Q4H PRN


   PRN Reason: Pain (Mild 1-3)/fever


Acetaminophen (Acetaminophen 650 Mg Supp)  650 mg RECTAL Q4H PRN


   PRN Reason: Pain (mild 1-3)


Albuterol (Albuterol 0.083% 2.5 Mg/3 Ml Neb Soln)  2.5 mg NEB Q2H PRN


   PRN Reason: Shortness Of Breath/wheezing


Albuterol/Ipratropium (Albuterol/Ipratropium 3.0-0.5 Mg/3 Ml Neb Soln)  3 ml NEB

Q4H PRN


   PRN Reason: Shortness Of Breath/wheezing


Heparin Sodium (Porcine) (Heparin Sodium 5,000 Units/Ml Vial)  5,000 units 

SUBCUT Q12H [facility]


   Last Admin: 04/07/21 06:12 Dose:  5,000 units


   Documented by: 


Ceftriaxone Sodium 2 gm/ (Sodium Chloride)  100 mls @ 200 mls/hr IV Q24H [facility]


   Last Admin: 04/07/21 12:16 Dose:  200 mls/hr


   Documented by: 


Potassium Chloride 20 meq/ (Dextrose/Water)  1,010 mls @ 75.75 mls/hr IV Q13H 

[facility]


   Last Admin: 04/07/21 11:04 Dose:  75.75 mls/hr


   Documented by: 


Insulin Human Lispro (Insulin Lispro 100 Unit/Ml)  0 unit SUBCUT QIDACANDBED 

[facility]; Protocol


   Last Admin: 04/07/21 12:16 Dose:  2 unit


   Documented by: 


Pantoprazole Sodium (Pantoprazole 40 Mg Vial)  40 mg IVPUSH DAILY [facility]


   Last Admin: 04/07/21 08:45 Dose:  40 mg


   Documented by: 


Sodium Chloride (Sodium Chloride 0.9% 10 Ml Syringe)  10 ml FLUSH ASDIRECTED PRN


   PRN Reason: Keep Vein Open


   Last Admin: 04/06/21 13:11 Dose:  10 ml


   Documented by: 





Discontinued Medications





Acetaminophen (Acetaminophen 650 Mg Supp)  650 mg RECTAL NOW ONE


   Stop: 04/06/21 14:30


   Last Admin: 04/06/21 15:05 Dose:  650 mg


   Documented by: 


Furosemide (Furosemide 20 Mg/2 Ml Vial)  20 mg IVPUSH ONETIME ONE


   Stop: 04/07/21 00:04


   Last Admin: 04/07/21 00:27 Dose:  20 mg


   Documented by: 


Furosemide (Furosemide 40 Mg/4 Ml Vial)  40 mg IVPUSH NOW ONE


   Stop: 04/07/21 08:31


   Last Admin: 04/07/21 08:42 Dose:  40 mg


   Documented by: 


Ceftriaxone Sodium 2 gm/ (Sodium Chloride)  100 mls @ 200 mls/hr IV ONETIME ONE


   Stop: 04/06/21 13:34


   Last Admin: 04/06/21 13:11 Dose:  200 mls/hr


   Documented by: 


Sodium Chloride (Normal Saline)  1,000 mls @ 125 mls/hr IV ASDIRECTED [facility]


   Last Infusion: 04/06/21 14:00 Dose:  999 mls/hr


   Documented by: 


Sodium Chloride (Normal Saline)  1,000 mls @ 1,000 mls/hr IV ONETIME ONE


   Stop: 04/06/21 14:55


   Last Admin: 04/06/21 15:05 Dose:  1,000 mls/hr


   Documented by: 


Sodium Chloride (Normal Saline)  1,000 mls @ 125 mls/hr IV ASDIRECTED [facility]


   Last Admin: 04/06/21 20:31 Dose:  150 mls/hr


   Documented by: 


Potassium Chloride 10 meq/ (Premix)  100 mls @ 100 mls/hr IV Q1H [facility]


   Stop: 04/07/21 04:14


   Last Admin: 04/07/21 03:35 Dose:  100 mls/hr


   Documented by: 


Sodium Chloride (Sodium Chloride 0.45%)  1,000 mls @ 125 mls/hr IV ASDIRECTED 

[facility]


   Last Infusion: 04/07/21 06:00 Dose:  75 mls/hr


   Documented by: 


Insulin Human Lispro (Insulin Lispro 100 Unit/Ml)  8 unit SUBCUT ONETIME ONE


   Stop: 04/06/21 17:16


   Last Admin: 04/06/21 18:05 Dose:  8 units


   Documented by: 


Insulin Human Lispro (Insulin Lispro 100 Units/Ml 3 Ml Vial)  0 unit SUBCUT 

QIDACANDBED [facility]; Protocol


Insulin Human Lispro (Insulin Lispro 100 Unit/Ml)  0 unit SUBCUT QIDACANDBED 

[facility]; Protocol


   Last Admin: 04/06/21 23:10 Dose:  5 units


   Documented by: 


Insulin Human Lispro (Insulin Lispro 100 Unit/Ml)  10 unit SUBCUT ONETIME ONE


   Stop: 04/06/21 20:21


   Last Admin: 04/06/21 20:28 Dose:  10 units


   Documented by: 


Insulin Human Lispro (Insulin Lispro 100 Unit/Ml)  2 unit SUBCUT ONETIME ONE; 

Protocol


   Stop: 04/07/21 02:11


   Last Admin: 04/07/21 02:29 Dose:  2 units


   Documented by: 


Pantoprazole Sodium (Pantoprazole 40 Mg Tab.Cr)  40 mg PO DAILY@0700 ANA


   Last Admin: 04/07/21 06:13 Dose:  Not Given


   Documented by: 











- Exam


Quality Assessment: Supplemental Oxygen


General: Alert


HEENT: Pupils Equal, Mucous Membr. Moist/Pink


Neck: Supple


Lungs: Normal Respiratory Effort, Crackles


Cardiovascular: Regular Rate, Regular Rhythm


GI/Abdominal Exam: Normal Bowel Sounds, Soft, Non-Tender, No Distention


Extremities: Normal Inspection, Normal Range of Motion, No Pedal Edema, Normal 

Capillary Refill


Psy/Mental Status: Alert, Normal Affect, Normal Mood





- Patient Data


Lab Results Last 24 hrs: 


                         Laboratory Results - last 24 hr











  04/06/21 04/06/21 04/06/21 Range/Units





  12:55 12:55 12:55 


 


WBC  8.35    (3.98-10.04)  K/mm3


 


RBC  4.54    (3.98-5.22)  M/mm3


 


Hgb  13.4  D    (11.2-15.7)  gm/dl


 


Hct  43.5    (34.1-44.9)  %


 


MCV  95.8 H D    (79.4-94.8)  fl


 


MCH  29.5    (25.6-32.2)  pg


 


MCHC  30.8 L    (32.2-35.5)  g/dl


 


RDW Std Deviation  50.5 H    (36.4-46.3)  fL


 


Plt Count  345  D    (182-369)  K/mm3


 


MPV  11.9    (9.4-12.3)  fl


 


Neut % (Auto)  65.6    (34.0-71.1)  %


 


Lymph % (Auto)  18.8 L    (19.3-51.7)  %


 


Mono % (Auto)  14.6 H    (4.7-12.5)  %


 


Eos % (Auto)  0 L    (0.7-5.8)  


 


Baso % (Auto)  0.4    (0.1-1.2)  %


 


Neut # (Auto)  5.48    (1.56-6.13)  K/mm3


 


Lymph # (Auto)  1.57    (1.18-3.74)  K/mm3


 


Mono # (Auto)  1.22 H    (0.24-0.36)  K/mm3


 


Eos # (Auto)  0.00 L    (0.04-0.36)  K/mm3


 


Baso # (Auto)  0.03    (0.01-0.08)  K/mm3


 


Sodium   145   (136-145)  mEq/L


 


Potassium   4.2   (3.5-5.1)  mEq/L


 


Chloride   108 H   ()  mEq/L


 


Carbon Dioxide   20 L   (21-32)  mEq/L


 


Anion Gap   21.2 H   (5-15)  


 


BUN   43 H D   (7-18)  mg/dL


 


Creatinine   2.2 H   (0.55-1.02)  mg/dL


 


Est Cr Clr Drug Dosing   15.26   mL/min


 


Estimated GFR (MDRD)   21   (>60)  mL/min


 


BUN/Creatinine Ratio   19.5 H   (14-18)  


 


Glucose   489 H   ()  mg/dL


 


POC Glucose     ()  mg/dL


 


Hemoglobin A1c    ( - 5.6) %


 


Lactic Acid     (0.4-2.0)  mmol/L


 


Calcium   9.3   (8.5-10.1)  mg/dL


 


Magnesium     (1.8-2.4)  mg/dl


 


Total Bilirubin   1.1 H   (0.2-1.0)  mg/dL


 


AST   30   (15-37)  U/L


 


ALT   60 H   (14-59)  U/L


 


Alkaline Phosphatase   104   ()  U/L


 


C-Reactive Protein   19.2 H*   (<1.0)  mg/dL


 


NT-Pro-B Natriuret Pep    8711 H  (0-450)  pg/mL


 


Total Protein   8.5 H   (6.4-8.2)  g/dl


 


Albumin   3.4   (3.4-5.0)  g/dl


 


Globulin   5.1   gm/dL


 


Albumin/Globulin Ratio   0.7 L   (1-2)  


 


TSH 3rd Generation     (0.358-3.74)  uIU/mL


 


Urine Color     (Yellow)  


 


Urine Appearance     (Clear)  


 


Urine pH     (5.0-8.0)  


 


Ur Specific Gravity     (1.005-1.030)  


 


Urine Protein     (Negative)  


 


Urine Glucose (UA)     (Negative)  


 


Urine Ketones     (Negative)  


 


Urine Occult Blood     (Negative)  


 


Urine Nitrite     (Negative)  


 


Urine Bilirubin     (Negative)  


 


Urine Urobilinogen     (0.2-1.0)  


 


Ur Leukocyte Esterase     (Negative)  


 


Urine RBC     (0-5)  /hpf


 


Urine WBC     (0-5)  /hpf


 


Ur Squamous Epith Cells     (0-5)  /hpf


 


Amorphous Sediment     (NOT SEEN)  /hpf


 


Urine Bacteria     (FEW)  /hpf


 


Urine Mucus     (FEW)  /hpf


 


Ketones     (0.0-0.3)  mM


 


Influenza Type A RNA     (NEGATIVE)  


 


Influenza Type B RNA     (NEGATIVE)  


 


SARS-CoV-2 RNA (YASH)     (NEGATIVE)  














  04/06/21 04/06/21 04/06/21 Range/Units





  12:55 12:55 12:55 


 


WBC     (3.98-10.04)  K/mm3


 


RBC     (3.98-5.22)  M/mm3


 


Hgb     (11.2-15.7)  gm/dl


 


Hct     (34.1-44.9)  %


 


MCV     (79.4-94.8)  fl


 


MCH     (25.6-32.2)  pg


 


MCHC     (32.2-35.5)  g/dl


 


RDW Std Deviation     (36.4-46.3)  fL


 


Plt Count     (182-369)  K/mm3


 


MPV     (9.4-12.3)  fl


 


Neut % (Auto)     (34.0-71.1)  %


 


Lymph % (Auto)     (19.3-51.7)  %


 


Mono % (Auto)     (4.7-12.5)  %


 


Eos % (Auto)     (0.7-5.8)  


 


Baso % (Auto)     (0.1-1.2)  %


 


Neut # (Auto)     (1.56-6.13)  K/mm3


 


Lymph # (Auto)     (1.18-3.74)  K/mm3


 


Mono # (Auto)     (0.24-0.36)  K/mm3


 


Eos # (Auto)     (0.04-0.36)  K/mm3


 


Baso # (Auto)     (0.01-0.08)  K/mm3


 


Sodium    Cancelled  (136-145)  mEq/L


 


Potassium    Cancelled  (3.5-5.1)  mEq/L


 


Chloride    Cancelled  ()  mEq/L


 


Carbon Dioxide    Cancelled  (21-32)  mEq/L


 


Anion Gap    Cancelled  (5-15)  


 


BUN    Cancelled  (7-18)  mg/dL


 


Creatinine    Cancelled  (0.55-1.02)  mg/dL


 


Est Cr Clr Drug Dosing    Cancelled  mL/min


 


Estimated GFR (MDRD)    Cancelled  (>60)  mL/min


 


BUN/Creatinine Ratio    Cancelled  (14-18)  


 


Glucose    Cancelled  ()  mg/dL


 


POC Glucose     ()  mg/dL


 


Hemoglobin A1c    ( - 5.6) %


 


Lactic Acid  5.2 H*    (0.4-2.0)  mmol/L


 


Calcium    Cancelled  (8.5-10.1)  mg/dL


 


Magnesium     (1.8-2.4)  mg/dl


 


Total Bilirubin     (0.2-1.0)  mg/dL


 


AST     (15-37)  U/L


 


ALT     (14-59)  U/L


 


Alkaline Phosphatase     ()  U/L


 


C-Reactive Protein     (<1.0)  mg/dL


 


NT-Pro-B Natriuret Pep     (0-450)  pg/mL


 


Total Protein     (6.4-8.2)  g/dl


 


Albumin     (3.4-5.0)  g/dl


 


Globulin     gm/dL


 


Albumin/Globulin Ratio     (1-2)  


 


TSH 3rd Generation    3.245  (0.358-3.74)  uIU/mL


 


Urine Color     (Yellow)  


 


Urine Appearance     (Clear)  


 


Urine pH     (5.0-8.0)  


 


Ur Specific Gravity     (1.005-1.030)  


 


Urine Protein     (Negative)  


 


Urine Glucose (UA)     (Negative)  


 


Urine Ketones     (Negative)  


 


Urine Occult Blood     (Negative)  


 


Urine Nitrite     (Negative)  


 


Urine Bilirubin     (Negative)  


 


Urine Urobilinogen     (0.2-1.0)  


 


Ur Leukocyte Esterase     (Negative)  


 


Urine RBC     (0-5)  /hpf


 


Urine WBC     (0-5)  /hpf


 


Ur Squamous Epith Cells     (0-5)  /hpf


 


Amorphous Sediment     (NOT SEEN)  /hpf


 


Urine Bacteria     (FEW)  /hpf


 


Urine Mucus     (FEW)  /hpf


 


Ketones   0.67   (0.0-0.3)  mM


 


Influenza Type A RNA     (NEGATIVE)  


 


Influenza Type B RNA     (NEGATIVE)  


 


SARS-CoV-2 RNA (YASH)     (NEGATIVE)  














  04/06/21 04/06/21 04/06/21 Range/Units





  12:55 13:08 13:10 


 


WBC     (3.98-10.04)  K/mm3


 


RBC     (3.98-5.22)  M/mm3


 


Hgb     (11.2-15.7)  gm/dl


 


Hct     (34.1-44.9)  %


 


MCV     (79.4-94.8)  fl


 


MCH     (25.6-32.2)  pg


 


MCHC     (32.2-35.5)  g/dl


 


RDW Std Deviation     (36.4-46.3)  fL


 


Plt Count     (182-369)  K/mm3


 


MPV     (9.4-12.3)  fl


 


Neut % (Auto)     (34.0-71.1)  %


 


Lymph % (Auto)     (19.3-51.7)  %


 


Mono % (Auto)     (4.7-12.5)  %


 


Eos % (Auto)     (0.7-5.8)  


 


Baso % (Auto)     (0.1-1.2)  %


 


Neut # (Auto)     (1.56-6.13)  K/mm3


 


Lymph # (Auto)     (1.18-3.74)  K/mm3


 


Mono # (Auto)     (0.24-0.36)  K/mm3


 


Eos # (Auto)     (0.04-0.36)  K/mm3


 


Baso # (Auto)     (0.01-0.08)  K/mm3


 


Sodium     (136-145)  mEq/L


 


Potassium     (3.5-5.1)  mEq/L


 


Chloride     ()  mEq/L


 


Carbon Dioxide     (21-32)  mEq/L


 


Anion Gap     (5-15)  


 


BUN     (7-18)  mg/dL


 


Creatinine     (0.55-1.02)  mg/dL


 


Est Cr Clr Drug Dosing     mL/min


 


Estimated GFR (MDRD)     (>60)  mL/min


 


BUN/Creatinine Ratio     (14-18)  


 


Glucose     ()  mg/dL


 


POC Glucose     ()  mg/dL


 


Hemoglobin A1c  8.5 H   ( - 5.6) %


 


Lactic Acid     (0.4-2.0)  mmol/L


 


Calcium     (8.5-10.1)  mg/dL


 


Magnesium     (1.8-2.4)  mg/dl


 


Total Bilirubin     (0.2-1.0)  mg/dL


 


AST     (15-37)  U/L


 


ALT     (14-59)  U/L


 


Alkaline Phosphatase     ()  U/L


 


C-Reactive Protein     (<1.0)  mg/dL


 


NT-Pro-B Natriuret Pep     (0-450)  pg/mL


 


Total Protein     (6.4-8.2)  g/dl


 


Albumin     (3.4-5.0)  g/dl


 


Globulin     gm/dL


 


Albumin/Globulin Ratio     (1-2)  


 


TSH 3rd Generation     (0.358-3.74)  uIU/mL


 


Urine Color    Dark yellow  (Yellow)  


 


Urine Appearance    Cloudy H  (Clear)  


 


Urine pH    6.0  (5.0-8.0)  


 


Ur Specific Gravity    > or = 1.030  (1.005-1.030)  


 


Urine Protein    3+ H  (Negative)  


 


Urine Glucose (UA)    2+ H  (Negative)  


 


Urine Ketones    Trace H  (Negative)  


 


Urine Occult Blood    3+ H  (Negative)  


 


Urine Nitrite    Negative  (Negative)  


 


Urine Bilirubin    1+ H  (Negative)  


 


Urine Urobilinogen    1.0  (0.2-1.0)  


 


Ur Leukocyte Esterase    3+ H  (Negative)  


 


Urine RBC    20-30 H  (0-5)  /hpf


 


Urine WBC    >100 H  (0-5)  /hpf


 


Ur Squamous Epith Cells    0-5  (0-5)  /hpf


 


Amorphous Sediment    Moderate H  (NOT SEEN)  /hpf


 


Urine Bacteria    Many H  (FEW)  /hpf


 


Urine Mucus    Not seen  (FEW)  /hpf


 


Ketones     (0.0-0.3)  mM


 


Influenza Type A RNA   Negative   (NEGATIVE)  


 


Influenza Type B RNA   Negative   (NEGATIVE)  


 


SARS-CoV-2 RNA (YASH)   Negative   (NEGATIVE)  














  04/06/21 04/06/21 04/06/21 Range/Units





  16:02 19:28 19:34 


 


WBC     (3.98-10.04)  K/mm3


 


RBC     (3.98-5.22)  M/mm3


 


Hgb     (11.2-15.7)  gm/dl


 


Hct     (34.1-44.9)  %


 


MCV     (79.4-94.8)  fl


 


MCH     (25.6-32.2)  pg


 


MCHC     (32.2-35.5)  g/dl


 


RDW Std Deviation     (36.4-46.3)  fL


 


Plt Count     (182-369)  K/mm3


 


MPV     (9.4-12.3)  fl


 


Neut % (Auto)     (34.0-71.1)  %


 


Lymph % (Auto)     (19.3-51.7)  %


 


Mono % (Auto)     (4.7-12.5)  %


 


Eos % (Auto)     (0.7-5.8)  


 


Baso % (Auto)     (0.1-1.2)  %


 


Neut # (Auto)     (1.56-6.13)  K/mm3


 


Lymph # (Auto)     (1.18-3.74)  K/mm3


 


Mono # (Auto)     (0.24-0.36)  K/mm3


 


Eos # (Auto)     (0.04-0.36)  K/mm3


 


Baso # (Auto)     (0.01-0.08)  K/mm3


 


Sodium     (136-145)  mEq/L


 


Potassium     (3.5-5.1)  mEq/L


 


Chloride     ()  mEq/L


 


Carbon Dioxide     (21-32)  mEq/L


 


Anion Gap     (5-15)  


 


BUN     (7-18)  mg/dL


 


Creatinine     (0.55-1.02)  mg/dL


 


Est Cr Clr Drug Dosing     mL/min


 


Estimated GFR (MDRD)     (>60)  mL/min


 


BUN/Creatinine Ratio     (14-18)  


 


Glucose     ()  mg/dL


 


POC Glucose    376 H  ()  mg/dL


 


Hemoglobin A1c    ( - 5.6) %


 


Lactic Acid  2.8 H*  4.1 H*   (0.4-2.0)  mmol/L


 


Calcium     (8.5-10.1)  mg/dL


 


Magnesium     (1.8-2.4)  mg/dl


 


Total Bilirubin     (0.2-1.0)  mg/dL


 


AST     (15-37)  U/L


 


ALT     (14-59)  U/L


 


Alkaline Phosphatase     ()  U/L


 


C-Reactive Protein     (<1.0)  mg/dL


 


NT-Pro-B Natriuret Pep     (0-450)  pg/mL


 


Total Protein     (6.4-8.2)  g/dl


 


Albumin     (3.4-5.0)  g/dl


 


Globulin     gm/dL


 


Albumin/Globulin Ratio     (1-2)  


 


TSH 3rd Generation     (0.358-3.74)  uIU/mL


 


Urine Color     (Yellow)  


 


Urine Appearance     (Clear)  


 


Urine pH     (5.0-8.0)  


 


Ur Specific Gravity     (1.005-1.030)  


 


Urine Protein     (Negative)  


 


Urine Glucose (UA)     (Negative)  


 


Urine Ketones     (Negative)  


 


Urine Occult Blood     (Negative)  


 


Urine Nitrite     (Negative)  


 


Urine Bilirubin     (Negative)  


 


Urine Urobilinogen     (0.2-1.0)  


 


Ur Leukocyte Esterase     (Negative)  


 


Urine RBC     (0-5)  /hpf


 


Urine WBC     (0-5)  /hpf


 


Ur Squamous Epith Cells     (0-5)  /hpf


 


Amorphous Sediment     (NOT SEEN)  /hpf


 


Urine Bacteria     (FEW)  /hpf


 


Urine Mucus     (FEW)  /hpf


 


Ketones     (0.0-0.3)  mM


 


Influenza Type A RNA     (NEGATIVE)  


 


Influenza Type B RNA     (NEGATIVE)  


 


SARS-CoV-2 RNA (YASH)     (NEGATIVE)  














  04/06/21 04/06/21 04/06/21 Range/Units





  22:56 23:03 23:03 


 


WBC     (3.98-10.04)  K/mm3


 


RBC     (3.98-5.22)  M/mm3


 


Hgb     (11.2-15.7)  gm/dl


 


Hct     (34.1-44.9)  %


 


MCV     (79.4-94.8)  fl


 


MCH     (25.6-32.2)  pg


 


MCHC     (32.2-35.5)  g/dl


 


RDW Std Deviation     (36.4-46.3)  fL


 


Plt Count     (182-369)  K/mm3


 


MPV     (9.4-12.3)  fl


 


Neut % (Auto)     (34.0-71.1)  %


 


Lymph % (Auto)     (19.3-51.7)  %


 


Mono % (Auto)     (4.7-12.5)  %


 


Eos % (Auto)     (0.7-5.8)  


 


Baso % (Auto)     (0.1-1.2)  %


 


Neut # (Auto)     (1.56-6.13)  K/mm3


 


Lymph # (Auto)     (1.18-3.74)  K/mm3


 


Mono # (Auto)     (0.24-0.36)  K/mm3


 


Eos # (Auto)     (0.04-0.36)  K/mm3


 


Baso # (Auto)     (0.01-0.08)  K/mm3


 


Sodium    151 H  (136-145)  mEq/L


 


Potassium    3.3 L  (3.5-5.1)  mEq/L


 


Chloride    116 H  ()  mEq/L


 


Carbon Dioxide    20 L  (21-32)  mEq/L


 


Anion Gap    18.3 H  (5-15)  


 


BUN    36 H  (7-18)  mg/dL


 


Creatinine    1.5 H  (0.55-1.02)  mg/dL


 


Est Cr Clr Drug Dosing    20.50  mL/min


 


Estimated GFR (MDRD)    33  (>60)  mL/min


 


BUN/Creatinine Ratio    24.0 H  (14-18)  


 


Glucose    268 H  ()  mg/dL


 


POC Glucose  309 H    ()  mg/dL


 


Hemoglobin A1c    ( - 5.6) %


 


Lactic Acid   2.7 H*   (0.4-2.0)  mmol/L


 


Calcium    7.5 L D  (8.5-10.1)  mg/dL


 


Magnesium     (1.8-2.4)  mg/dl


 


Total Bilirubin     (0.2-1.0)  mg/dL


 


AST     (15-37)  U/L


 


ALT     (14-59)  U/L


 


Alkaline Phosphatase     ()  U/L


 


C-Reactive Protein     (<1.0)  mg/dL


 


NT-Pro-B Natriuret Pep     (0-450)  pg/mL


 


Total Protein     (6.4-8.2)  g/dl


 


Albumin     (3.4-5.0)  g/dl


 


Globulin     gm/dL


 


Albumin/Globulin Ratio     (1-2)  


 


TSH 3rd Generation     (0.358-3.74)  uIU/mL


 


Urine Color     (Yellow)  


 


Urine Appearance     (Clear)  


 


Urine pH     (5.0-8.0)  


 


Ur Specific Gravity     (1.005-1.030)  


 


Urine Protein     (Negative)  


 


Urine Glucose (UA)     (Negative)  


 


Urine Ketones     (Negative)  


 


Urine Occult Blood     (Negative)  


 


Urine Nitrite     (Negative)  


 


Urine Bilirubin     (Negative)  


 


Urine Urobilinogen     (0.2-1.0)  


 


Ur Leukocyte Esterase     (Negative)  


 


Urine RBC     (0-5)  /hpf


 


Urine WBC     (0-5)  /hpf


 


Ur Squamous Epith Cells     (0-5)  /hpf


 


Amorphous Sediment     (NOT SEEN)  /hpf


 


Urine Bacteria     (FEW)  /hpf


 


Urine Mucus     (FEW)  /hpf


 


Ketones     (0.0-0.3)  mM


 


Influenza Type A RNA     (NEGATIVE)  


 


Influenza Type B RNA     (NEGATIVE)  


 


SARS-CoV-2 RNA (YASH)     (NEGATIVE)  














  04/07/21 04/07/21 04/07/21 Range/Units





  02:00 02:06 05:40 


 


WBC    6.38  (3.98-10.04)  K/mm3


 


RBC    3.57 L  (3.98-5.22)  M/mm3


 


Hgb    10.6 L D  (11.2-15.7)  gm/dl


 


Hct    34.5  (34.1-44.9)  %


 


MCV    96.6 H  (79.4-94.8)  fl


 


MCH    29.7  (25.6-32.2)  pg


 


MCHC    30.7 L  (32.2-35.5)  g/dl


 


RDW Std Deviation    48.9 H  (36.4-46.3)  fL


 


Plt Count    213  D  (182-369)  K/mm3


 


MPV    12.0  (9.4-12.3)  fl


 


Neut % (Auto)    62.7  (34.0-71.1)  %


 


Lymph % (Auto)    24.6  (19.3-51.7)  %


 


Mono % (Auto)    11.9  (4.7-12.5)  %


 


Eos % (Auto)    0.2 L  (0.7-5.8)  


 


Baso % (Auto)    0.3  (0.1-1.2)  %


 


Neut # (Auto)    4.00  (1.56-6.13)  K/mm3


 


Lymph # (Auto)    1.57  (1.18-3.74)  K/mm3


 


Mono # (Auto)    0.76 H  (0.24-0.36)  K/mm3


 


Eos # (Auto)    0.01 L  (0.04-0.36)  K/mm3


 


Baso # (Auto)    0.02  (0.01-0.08)  K/mm3


 


Sodium     (136-145)  mEq/L


 


Potassium     (3.5-5.1)  mEq/L


 


Chloride     ()  mEq/L


 


Carbon Dioxide     (21-32)  mEq/L


 


Anion Gap     (5-15)  


 


BUN     (7-18)  mg/dL


 


Creatinine     (0.55-1.02)  mg/dL


 


Est Cr Clr Drug Dosing     mL/min


 


Estimated GFR (MDRD)     (>60)  mL/min


 


BUN/Creatinine Ratio     (14-18)  


 


Glucose     ()  mg/dL


 


POC Glucose   208 H   ()  mg/dL


 


Hemoglobin A1c    ( - 5.6) %


 


Lactic Acid  2.0    (0.4-2.0)  mmol/L


 


Calcium     (8.5-10.1)  mg/dL


 


Magnesium     (1.8-2.4)  mg/dl


 


Total Bilirubin     (0.2-1.0)  mg/dL


 


AST     (15-37)  U/L


 


ALT     (14-59)  U/L


 


Alkaline Phosphatase     ()  U/L


 


C-Reactive Protein     (<1.0)  mg/dL


 


NT-Pro-B Natriuret Pep     (0-450)  pg/mL


 


Total Protein     (6.4-8.2)  g/dl


 


Albumin     (3.4-5.0)  g/dl


 


Globulin     gm/dL


 


Albumin/Globulin Ratio     (1-2)  


 


TSH 3rd Generation     (0.358-3.74)  uIU/mL


 


Urine Color     (Yellow)  


 


Urine Appearance     (Clear)  


 


Urine pH     (5.0-8.0)  


 


Ur Specific Gravity     (1.005-1.030)  


 


Urine Protein     (Negative)  


 


Urine Glucose (UA)     (Negative)  


 


Urine Ketones     (Negative)  


 


Urine Occult Blood     (Negative)  


 


Urine Nitrite     (Negative)  


 


Urine Bilirubin     (Negative)  


 


Urine Urobilinogen     (0.2-1.0)  


 


Ur Leukocyte Esterase     (Negative)  


 


Urine RBC     (0-5)  /hpf


 


Urine WBC     (0-5)  /hpf


 


Ur Squamous Epith Cells     (0-5)  /hpf


 


Amorphous Sediment     (NOT SEEN)  /hpf


 


Urine Bacteria     (FEW)  /hpf


 


Urine Mucus     (FEW)  /hpf


 


Ketones     (0.0-0.3)  mM


 


Influenza Type A RNA     (NEGATIVE)  


 


Influenza Type B RNA     (NEGATIVE)  


 


SARS-CoV-2 RNA ([facility])     (NEGATIVE)  














  04/07/21 04/07/21 04/07/21 Range/Units





  05:40 05:52 08:17 


 


WBC     (3.98-10.04)  K/mm3


 


RBC     (3.98-5.22)  M/mm3


 


Hgb     (11.2-15.7)  gm/dl


 


Hct     (34.1-44.9)  %


 


MCV     (79.4-94.8)  fl


 


MCH     (25.6-32.2)  pg


 


MCHC     (32.2-35.5)  g/dl


 


RDW Std Deviation     (36.4-46.3)  fL


 


Plt Count     (182-369)  K/mm3


 


MPV     (9.4-12.3)  fl


 


Neut % (Auto)     (34.0-71.1)  %


 


Lymph % (Auto)     (19.3-51.7)  %


 


Mono % (Auto)     (4.7-12.5)  %


 


Eos % (Auto)     (0.7-5.8)  


 


Baso % (Auto)     (0.1-1.2)  %


 


Neut # (Auto)     (1.56-6.13)  K/mm3


 


Lymph # (Auto)     (1.18-3.74)  K/mm3


 


Mono # (Auto)     (0.24-0.36)  K/mm3


 


Eos # (Auto)     (0.04-0.36)  K/mm3


 


Baso # (Auto)     (0.01-0.08)  K/mm3


 


Sodium  150 H    (136-145)  mEq/L


 


Potassium  3.6    (3.5-5.1)  mEq/L


 


Chloride  116 H    ()  mEq/L


 


Carbon Dioxide  22    (21-32)  mEq/L


 


Anion Gap  15.6 H    (5-15)  


 


BUN  31 H    (7-18)  mg/dL


 


Creatinine  1.3 H    (0.55-1.02)  mg/dL


 


Est Cr Clr Drug Dosing  23.66    mL/min


 


Estimated GFR (MDRD)  39    (>60)  mL/min


 


BUN/Creatinine Ratio  23.8 H    (14-18)  


 


Glucose  162 H    ()  mg/dL


 


POC Glucose   171 H  145 H  ()  mg/dL


 


Hemoglobin A1c    ( - 5.6) %


 


Lactic Acid     (0.4-2.0)  mmol/L


 


Calcium  7.7 L    (8.5-10.1)  mg/dL


 


Magnesium  2.0    (1.8-2.4)  mg/dl


 


Total Bilirubin  0.6    (0.2-1.0)  mg/dL


 


AST  27    (15-37)  U/L


 


ALT  41    (14-59)  U/L


 


Alkaline Phosphatase  71    ()  U/L


 


C-Reactive Protein  15.5 H*    (<1.0)  mg/dL


 


NT-Pro-B Natriuret Pep     (0-450)  pg/mL


 


Total Protein  6.5    (6.4-8.2)  g/dl


 


Albumin  2.5 L    (3.4-5.0)  g/dl


 


Globulin  4.0    gm/dL


 


Albumin/Globulin Ratio  0.6 L    (1-2)  


 


TSH 3rd Generation     (0.358-3.74)  uIU/mL


 


Urine Color     (Yellow)  


 


Urine Appearance     (Clear)  


 


Urine pH     (5.0-8.0)  


 


Ur Specific Gravity     (1.005-1.030)  


 


Urine Protein     (Negative)  


 


Urine Glucose (UA)     (Negative)  


 


Urine Ketones     (Negative)  


 


Urine Occult Blood     (Negative)  


 


Urine Nitrite     (Negative)  


 


Urine Bilirubin     (Negative)  


 


Urine Urobilinogen     (0.2-1.0)  


 


Ur Leukocyte Esterase     (Negative)  


 


Urine RBC     (0-5)  /hpf


 


Urine WBC     (0-5)  /hpf


 


Ur Squamous Epith Cells     (0-5)  /hpf


 


Amorphous Sediment     (NOT SEEN)  /hpf


 


Urine Bacteria     (FEW)  /hpf


 


Urine Mucus     (FEW)  /hpf


 


Ketones     (0.0-0.3)  mM


 


Influenza Type A RNA     (NEGATIVE)  


 


Influenza Type B RNA     (NEGATIVE)  


 


SARS-CoV-2 RNA ([facility])     (NEGATIVE)  














  04/07/21 Range/Units





  11:27 


 


WBC   (3.98-10.04)  K/mm3


 


RBC   (3.98-5.22)  M/mm3


 


Hgb   (11.2-15.7)  gm/dl


 


Hct   (34.1-44.9)  %


 


MCV   (79.4-94.8)  fl


 


MCH   (25.6-32.2)  pg


 


MCHC   (32.2-35.5)  g/dl


 


RDW Std Deviation   (36.4-46.3)  fL


 


Plt Count   (182-369)  K/mm3


 


MPV   (9.4-12.3)  fl


 


Neut % (Auto)   (34.0-71.1)  %


 


Lymph % (Auto)   (19.3-51.7)  %


 


Mono % (Auto)   (4.7-12.5)  %


 


Eos % (Auto)   (0.7-5.8)  


 


Baso % (Auto)   (0.1-1.2)  %


 


Neut # (Auto)   (1.56-6.13)  K/mm3


 


Lymph # (Auto)   (1.18-3.74)  K/mm3


 


Mono # (Auto)   (0.24-0.36)  K/mm3


 


Eos # (Auto)   (0.04-0.36)  K/mm3


 


Baso # (Auto)   (0.01-0.08)  K/mm3


 


Sodium   (136-145)  mEq/L


 


Potassium   (3.5-5.1)  mEq/L


 


Chloride   ()  mEq/L


 


Carbon Dioxide   (21-32)  mEq/L


 


Anion Gap   (5-15)  


 


BUN   (7-18)  mg/dL


 


Creatinine   (0.55-1.02)  mg/dL


 


Est Cr Clr Drug Dosing   mL/min


 


Estimated GFR (MDRD)   (>60)  mL/min


 


BUN/Creatinine Ratio   (14-18)  


 


Glucose   ()  mg/dL


 


POC Glucose  242 H  ()  mg/dL


 


Hemoglobin A1c  ( - 5.6) %


 


Lactic Acid   (0.4-2.0)  mmol/L


 


Calcium   (8.5-10.1)  mg/dL


 


Magnesium   (1.8-2.4)  mg/dl


 


Total Bilirubin   (0.2-1.0)  mg/dL


 


AST   (15-37)  U/L


 


ALT   (14-59)  U/L


 


Alkaline Phosphatase   ()  U/L


 


C-Reactive Protein   (<1.0)  mg/dL


 


NT-Pro-B Natriuret Pep   (0-450)  pg/mL


 


Total Protein   (6.4-8.2)  g/dl


 


Albumin   (3.4-5.0)  g/dl


 


Globulin   gm/dL


 


Albumin/Globulin Ratio   (1-2)  


 


TSH 3rd Generation   (0.358-3.74)  uIU/mL


 


Urine Color   (Yellow)  


 


Urine Appearance   (Clear)  


 


Urine pH   (5.0-8.0)  


 


Ur Specific Gravity   (1.005-1.030)  


 


Urine Protein   (Negative)  


 


Urine Glucose (UA)   (Negative)  


 


Urine Ketones   (Negative)  


 


Urine Occult Blood   (Negative)  


 


Urine Nitrite   (Negative)  


 


Urine Bilirubin   (Negative)  


 


Urine Urobilinogen   (0.2-1.0)  


 


Ur Leukocyte Esterase   (Negative)  


 


Urine RBC   (0-5)  /hpf


 


Urine WBC   (0-5)  /hpf


 


Ur Squamous Epith Cells   (0-5)  /hpf


 


Amorphous Sediment   (NOT SEEN)  /hpf


 


Urine Bacteria   (FEW)  /hpf


 


Urine Mucus   (FEW)  /hpf


 


Ketones   (0.0-0.3)  mM


 


Influenza Type A RNA   (NEGATIVE)  


 


Influenza Type B RNA   (NEGATIVE)  


 


SARS-CoV-2 RNA ([facility])   (NEGATIVE)  











Result Diagrams: 


                                 04/07/21 05:40





                                 04/07/21 05:40


Chris Results Last 24 hrs: 


                                  Microbiology











 04/06/21 13:03 Aerobic Blood Culture - Preliminary





 Blood - Venous - Lab Draw    NO GROWTH AFTER 1 DAY





 Anaerobic Blood Culture - Preliminary





    NO GROWTH AFTER 1 DAY


 


 04/06/21 12:55 Aerobic Blood Culture - Preliminary





 Blood - Venous    NO GROWTH AFTER 1 DAY





 Anaerobic Blood Culture - Preliminary





    NO GROWTH AFTER 1 DAY


 


 04/06/21 13:10 Urine Culture - Preliminary





 Urine, Quick Cath (In-Out)    Gram Negative Rods














Sepsis Event Note





- Evaluation


Sepsis Screening Result: No Definite Risk





- Focused Exam


Vital Signs: 


                                   Vital Signs











  Temp Temp Pulse Resp BP BP Pulse Ox


 


 04/07/21 12:00   97.2 F   15   84/52 L  91 L


 


 04/07/21 10:00    80   133/66   91 L


 


 04/07/21 09:00    76  16  154/74 H   92 L


 


 04/07/21 08:00   97.5 F   22 H   138/69  91 L


 


 04/07/21 06:25       


 


 04/07/21 04:00  98.6 F    23 H   108/62  92 L














  Pulse Ox


 


 04/07/21 12:00 


 


 04/07/21 10:00 


 


 04/07/21 09:00 


 


 04/07/21 08:00 


 


 04/07/21 06:25  91 L


 


 04/07/21 04:00 














- Problem List & Annotations


(1) Severe sepsis


SNOMED Code(s): 68197784


   Code(s): A41.9 - SEPSIS, UNSPECIFIED ORGANISM; R65.20 - SEVERE SEPSIS WITHOUT

SEPTIC SHOCK   Status: Acute   Current Visit: Yes   





(2) Acute on chronic renal failure


SNOMED Code(s): 453641992


   Code(s): N17.9 - ACUTE KIDNEY FAILURE, UNSPECIFIED; N18.9 - CHRONIC KIDNEY 

DISEASE, UNSPECIFIED   Status: Acute   Current Visit: Yes   





(3) Hyperglycemia


SNOMED Code(s): 07512703


   Code(s): R73.9 - HYPERGLYCEMIA, UNSPECIFIED   Status: Acute   Current Visit: 

Yes   





(4) Advanced dementia


SNOMED Code(s): 97289646


   Code(s): F03.90 - UNSPECIFIED DEMENTIA WITHOUT BEHAVIORAL DISTURBANCE   

Status: Acute   Current Visit: Yes   





(5) Renal insufficiency


SNOMED Code(s): 073544951, 237501655


   Code(s): N28.9 - DISORDER OF KIDNEY AND URETER, UNSPECIFIED   Status: Acute  

Current Visit: Yes   





- Problem List Review


Problem List Initiated/Reviewed/Updated: Yes





- My Orders


Last 24 Hours: 


My Active Orders





04/06/21 16:23


Isolation [COMM] Routine 





04/06/21 16:55


Bedrest Bedside Commode [RC] ASDIRECTED 


Resuscitation Status Routine 





04/06/21 16:56


Oxygen Therapy [RC] PRN 


VTE/DVT Education [RC] PER UNIT ROUTINE 





04/06/21 16:59


Sequential Compression Device [OM.PC] Per Unit Routine 





04/06/21 17:04


Acetaminophen [TylenoL]   650 mg PO Q4H PRN 


Acetaminophen [Tylenol]   650 mg RECTAL Q4H PRN 


Albuterol [Proventil Neb Soln]   2.5 mg NEB Q2H PRN 


Albuterol/Ipratropium [DuoNeb 3.0-0.5 MG/3 ML]   3 ml NEB Q4H PRN 





04/06/21 17:06


Antiembolic Devices [RC] 09,21 


RT Aerosol Therapy [RC] ASDIRECTED 





04/06/21 18:00


Heparin Sodium   5,000 units SUBCUT Q12H 





04/06/21 18:18


Blood Glucose Check, Bedside [RC] WITHMEALSANDBED 





04/07/21 06:00


Insulin Lispro [HumaLOG]   See Protocol  SUBCUT QIDACANDBED 





04/07/21 08:30


Potassium Chloride 20 meq   Dextrose 5% in Water 1,000 ml IV Q13H 





04/07/21 09:00


Pantoprazole [ProTONIX IV***]   40 mg IVPUSH DAILY 





04/07/21 10:39


SLP Evaluation and Treatment [CONS] Routine 





04/07/21 Lunch


Pureed Diet [DIET] 





04/07/21 13:00


cefTRIAXone [Rocephin] 2 gm   Sodium Chloride 0.9% [Normal Saline] 100 ml IV 

Q24H 





04/08/21 05:11


C-REACTIVE PROTEIN [CHEM] AM 


CBC WITH AUTO DIFF [HEME] AM 


CMP [COMPREHENSIVE METABOLIC PN,CMP] [CHEM] AM 


MAGNESIUM [CHEM] AM 


PHOSPHORUS [CHEM] AM 





04/09/21 05:11


C-REACTIVE PROTEIN [CHEM] AM 


CBC WITH AUTO DIFF [HEME] AM 


CMP [COMPREHENSIVE METABOLIC PN,CMP] [CHEM] AM 


MAGNESIUM [CHEM] AM 


PHOSPHORUS [CHEM] AM 





04/10/21 05:11


C-REACTIVE PROTEIN [CHEM] AM 


CBC WITH AUTO DIFF [HEME] AM 


CMP [COMPREHENSIVE METABOLIC PN,CMP] [CHEM] AM 


MAGNESIUM [CHEM] AM 


PHOSPHORUS [CHEM] AM 





04/11/21 05:11


C-REACTIVE PROTEIN [CHEM] AM 


CBC WITH AUTO DIFF [HEME] AM 


CMP [COMPREHENSIVE METABOLIC PN,CMP] [CHEM] AM 


MAGNESIUM [CHEM] AM 


PHOSPHORUS [CHEM] AM 














- Plan


Plan:: 





Assessment


88-year-old female with severe dementia presents with severe sepsis secondary to

 UTI.


Severe sepsis


Complicated UTI


* On presentation to the emergency department patient was febrile, tachycardic, 

  tachypneic, and hypoxemia


* Initial lactic acid was 5.2 with a repeat of 2.8 3 hours later and after fluid

   bolus.  Now 2.0


* Acute on chronic renal insufficiency, lactic acid, elevated BNP showing organ 

  dysfunction


* Anion gap improved to 15.6


* WBC 6.4


* UA: WBC greater than 100


* Started on Rocephin in the ER after getting blood cultures and urine culture


* Given 30 mL/kg IV fluid bolus


Plan


* Admit to ICU


* Rocephin 2 g every 24 hours


* Await blood and urine cultures


* Discussed with daughter high mortality rate with severe sepsis and 88-year-old

   with multiple medical problems.  We will keep her informed of any 

  complications occur during treatment.


Acute on chronic renal insufficiencyimproved


* Secondary to sepsis


* Creatinine 1.3, GFR 39


* No recent renal function available, but in June 2017 estimated GFR was 47


Plan


* Switch IV fluids to D5W secondary to hyponatremia


* Follow kidney function closely


* Renally dose medications and avoid nephrotoxic medications


Hyperglycemia, history of prediabetes


* Blood sugars are improved on sliding scale.


* Hemoglobin A1c 8.5


* Expect blood sugars to increase again now on D5W


Plan


* Sliding scale insulin


* Bedside glucose monitoring 4 times daily


* Lantus 7 units daily.  Will increase tomorrow.


CHF


* proBNP is 8711


* Chest x-ray shows no acute findings


* proBNP likely artificially elevated secondary to renal dysfunction, but still 

  significantly elevated


* Fluid bolus secondary to sepsis going to exacerbate CHF


* Patient is at high risk for pulmonary edema


* Patient does have crackles and received Lasix overnight


Plan


* Currently on 4 L nasal cannula


* Marroquin catheter for strict I's and O's will for sepsis and CHF


* Monitor respiratory status closely


* Careful rehydration


* Lasix as needed


Hypernatremia


* Sodium 150


Plan


* Switch to D5W


* Follow sodium daily


Chronic: Hypertension, GERD, colitis, chronic renal insufficiency, anxiety, 

dementia, depression, vitamin D deficiencies, behavioral disturbances, anemia





VTE prophylaxis with heparin


CODE STATUS: DNR/DNI


Prognosis is very poor.  Discussed with daughter who is POA and she voices 

understanding.

## 2021-04-08 RX ADMIN — HEPARIN SODIUM SCH UNITS: 5000 INJECTION, SOLUTION INTRAVENOUS; SUBCUTANEOUS at 17:26

## 2021-04-08 RX ADMIN — INSULIN LISPRO SCH UNIT: 100 INJECTION, SOLUTION INTRAVENOUS; SUBCUTANEOUS at 22:04

## 2021-04-08 RX ADMIN — INSULIN LISPRO SCH UNIT: 100 INJECTION, SOLUTION INTRAVENOUS; SUBCUTANEOUS at 06:00

## 2021-04-08 RX ADMIN — INSULIN LISPRO SCH UNIT: 100 INJECTION, SOLUTION INTRAVENOUS; SUBCUTANEOUS at 17:41

## 2021-04-08 RX ADMIN — HEPARIN SODIUM SCH UNITS: 5000 INJECTION, SOLUTION INTRAVENOUS; SUBCUTANEOUS at 05:59

## 2021-04-08 RX ADMIN — INSULIN LISPRO SCH UNIT: 100 INJECTION, SOLUTION INTRAVENOUS; SUBCUTANEOUS at 11:41

## 2021-04-08 RX ADMIN — INSULIN GLARGINE SCH UNITS: 100 INJECTION, SOLUTION SUBCUTANEOUS at 08:09

## 2021-04-08 NOTE — CR
Chest: Portable view of the chest was obtained utilizing upright 

position.

 

Comparison: Prior chest x-ray of 04/06/21.

 

Heart size and mediastinum are within normal limits for portable 

technique.  Elevated right hemidiaphragm is seen which appears 

chronic.  Slight atelectasis is noted within the right lung base.  

Lungs otherwise are clear.  Bony structures are osteopenic.

 

Impression:

1.  Slight atelectasis within the right lung base.

2.  Other findings as noted above.  Nothing acute is seen.

 

Diagnostic code #2

## 2021-04-08 NOTE — PCM.PN
- General Info


Date of Service: 04/08/21


Admission Dx/Problem (Free Text): 


                           Admission Diagnosis/Problem





Admission Diagnosis/Problem      Sepsis








Subjective Update: 





Patient is noncommunicative, therefore subjective comes from nursing.


Nurse reports that she had an episode of hypoxia last night of around 70%.  It 

appears this was secondary to a mucous plug because a dark brown mucousy 

substance was suctioned.  Patient has not had any oral intake prior to this.


Functional Status: Reports: Other (Unable to obtain secondary to nonverbal due 

to dementia)





- Patient Data


Vitals - Most Recent: 


                                Last Vital Signs











Temp  97.3 F   04/08/21 04:00


 


Pulse  72   04/07/21 18:00


 


Resp  14   04/08/21 04:00


 


BP  100/52 L  04/08/21 04:00


 


Pulse Ox  2 L  04/08/21 06:46











Weight - Most Recent: 129 lb 3.2 oz


I&O - Last 24 Hours: 


                                 Intake & Output











 04/07/21 04/08/21 04/08/21





 22:59 06:59 14:59


 


Intake Total 415 883 


 


Output Total 405 645 


 


Balance 10 238 











Lab Results Last 24 Hours: 


                         Laboratory Results - last 24 hr











  04/07/21 04/07/21 04/07/21 Range/Units





  08:17 11:27 16:10 


 


WBC     (3.98-10.04)  K/mm3


 


RBC     (3.98-5.22)  M/mm3


 


Hgb     (11.2-15.7)  gm/dl


 


Hct     (34.1-44.9)  %


 


MCV     (79.4-94.8)  fl


 


MCH     (25.6-32.2)  pg


 


MCHC     (32.2-35.5)  g/dl


 


RDW Std Deviation     (36.4-46.3)  fL


 


Plt Count     (182-369)  K/mm3


 


MPV     (9.4-12.3)  fl


 


Neut % (Auto)     (34.0-71.1)  %


 


Lymph % (Auto)     (19.3-51.7)  %


 


Mono % (Auto)     (4.7-12.5)  %


 


Eos % (Auto)     (0.7-5.8)  


 


Baso % (Auto)     (0.1-1.2)  %


 


Neut # (Auto)     (1.56-6.13)  K/mm3


 


Lymph # (Auto)     (1.18-3.74)  K/mm3


 


Mono # (Auto)     (0.24-0.36)  K/mm3


 


Eos # (Auto)     (0.04-0.36)  K/mm3


 


Baso # (Auto)     (0.01-0.08)  K/mm3


 


Sodium     (136-145)  mEq/L


 


Potassium     (3.5-5.1)  mEq/L


 


Chloride     ()  mEq/L


 


Carbon Dioxide     (21-32)  mEq/L


 


Anion Gap     (5-15)  


 


BUN     (7-18)  mg/dL


 


Creatinine     (0.55-1.02)  mg/dL


 


Est Cr Clr Drug Dosing     mL/min


 


Estimated GFR (MDRD)     (>60)  mL/min


 


BUN/Creatinine Ratio     (14-18)  


 


Glucose     ()  mg/dL


 


POC Glucose  145 H  242 H  275 H  ()  mg/dL


 


Calcium     (8.5-10.1)  mg/dL


 


Phosphorus     (2.6-4.7)  mg/dL


 


Magnesium     (1.8-2.4)  mg/dl


 


Total Bilirubin     (0.2-1.0)  mg/dL


 


AST     (15-37)  U/L


 


ALT     (14-59)  U/L


 


Alkaline Phosphatase     ()  U/L


 


C-Reactive Protein     (<1.0)  mg/dL


 


Total Protein     (6.4-8.2)  g/dl


 


Albumin     (3.4-5.0)  g/dl


 


Globulin     gm/dL


 


Albumin/Globulin Ratio     (1-2)  


 


MRSA (PCR)     














  04/07/21 04/07/21 04/07/21 Range/Units





  18:02 20:30 21:20 


 


WBC     (3.98-10.04)  K/mm3


 


RBC     (3.98-5.22)  M/mm3


 


Hgb     (11.2-15.7)  gm/dl


 


Hct     (34.1-44.9)  %


 


MCV     (79.4-94.8)  fl


 


MCH     (25.6-32.2)  pg


 


MCHC     (32.2-35.5)  g/dl


 


RDW Std Deviation     (36.4-46.3)  fL


 


Plt Count     (182-369)  K/mm3


 


MPV     (9.4-12.3)  fl


 


Neut % (Auto)     (34.0-71.1)  %


 


Lymph % (Auto)     (19.3-51.7)  %


 


Mono % (Auto)     (4.7-12.5)  %


 


Eos % (Auto)     (0.7-5.8)  


 


Baso % (Auto)     (0.1-1.2)  %


 


Neut # (Auto)     (1.56-6.13)  K/mm3


 


Lymph # (Auto)     (1.18-3.74)  K/mm3


 


Mono # (Auto)     (0.24-0.36)  K/mm3


 


Eos # (Auto)     (0.04-0.36)  K/mm3


 


Baso # (Auto)     (0.01-0.08)  K/mm3


 


Sodium     (136-145)  mEq/L


 


Potassium     (3.5-5.1)  mEq/L


 


Chloride     ()  mEq/L


 


Carbon Dioxide     (21-32)  mEq/L


 


Anion Gap     (5-15)  


 


BUN     (7-18)  mg/dL


 


Creatinine     (0.55-1.02)  mg/dL


 


Est Cr Clr Drug Dosing     mL/min


 


Estimated GFR (MDRD)     (>60)  mL/min


 


BUN/Creatinine Ratio     (14-18)  


 


Glucose     ()  mg/dL


 


POC Glucose  273 H   245 H  ()  mg/dL


 


Calcium     (8.5-10.1)  mg/dL


 


Phosphorus     (2.6-4.7)  mg/dL


 


Magnesium     (1.8-2.4)  mg/dl


 


Total Bilirubin     (0.2-1.0)  mg/dL


 


AST     (15-37)  U/L


 


ALT     (14-59)  U/L


 


Alkaline Phosphatase     ()  U/L


 


C-Reactive Protein     (<1.0)  mg/dL


 


Total Protein     (6.4-8.2)  g/dl


 


Albumin     (3.4-5.0)  g/dl


 


Globulin     gm/dL


 


Albumin/Globulin Ratio     (1-2)  


 


MRSA (PCR)   Negative   














  04/08/21 04/08/21 04/08/21 Range/Units





  05:05 05:35 05:50 


 


WBC  10.83 H    (3.98-10.04)  K/mm3


 


RBC  3.98    (3.98-5.22)  M/mm3


 


Hgb  11.8    (11.2-15.7)  gm/dl


 


Hct  37.4    (34.1-44.9)  %


 


MCV  94.0    (79.4-94.8)  fl


 


MCH  29.6    (25.6-32.2)  pg


 


MCHC  31.6 L    (32.2-35.5)  g/dl


 


RDW Std Deviation  46.1    (36.4-46.3)  fL


 


Plt Count  221    (182-369)  K/mm3


 


MPV  11.8    (9.4-12.3)  fl


 


Neut % (Auto)  73.6 H    (34.0-71.1)  %


 


Lymph % (Auto)  17.5 L    (19.3-51.7)  %


 


Mono % (Auto)  7.4    (4.7-12.5)  %


 


Eos % (Auto)  0.9    (0.7-5.8)  


 


Baso % (Auto)  0.2    (0.1-1.2)  %


 


Neut # (Auto)  7.97 H    (1.56-6.13)  K/mm3


 


Lymph # (Auto)  1.90    (1.18-3.74)  K/mm3


 


Mono # (Auto)  0.80 H    (0.24-0.36)  K/mm3


 


Eos # (Auto)  0.10    (0.04-0.36)  K/mm3


 


Baso # (Auto)  0.02    (0.01-0.08)  K/mm3


 


Sodium    137  D  (136-145)  mEq/L


 


Potassium    3.2 L  (3.5-5.1)  mEq/L


 


Chloride    105  ()  mEq/L


 


Carbon Dioxide    18 L  (21-32)  mEq/L


 


Anion Gap    17.2 H  (5-15)  


 


BUN    27 H  (7-18)  mg/dL


 


Creatinine    1.4 H  (0.55-1.02)  mg/dL


 


Est Cr Clr Drug Dosing    21.78  mL/min


 


Estimated GFR (MDRD)    35  (>60)  mL/min


 


BUN/Creatinine Ratio    19.3 H  (14-18)  


 


Glucose    236 H  ()  mg/dL


 


POC Glucose   245 H   ()  mg/dL


 


Calcium    7.8 L  (8.5-10.1)  mg/dL


 


Phosphorus    2.1 L  (2.6-4.7)  mg/dL


 


Magnesium    1.8  (1.8-2.4)  mg/dl


 


Total Bilirubin    0.5  (0.2-1.0)  mg/dL


 


AST    26  (15-37)  U/L


 


ALT    37  (14-59)  U/L


 


Alkaline Phosphatase    79  ()  U/L


 


C-Reactive Protein    17.2 H*  (<1.0)  mg/dL


 


Total Protein    6.8  (6.4-8.2)  g/dl


 


Albumin    2.6 L  (3.4-5.0)  g/dl


 


Globulin    4.2  gm/dL


 


Albumin/Globulin Ratio    0.6 L  (1-2)  


 


MRSA (PCR)     











Chris Results Last 24 Hours: 


                                  Microbiology











 04/06/21 13:03 Aerobic Blood Culture - Preliminary





 Blood - Venous - Lab Draw    NO GROWTH AFTER 1 DAY





 Anaerobic Blood Culture - Preliminary





    NO GROWTH AFTER 1 DAY


 


 04/06/21 12:55 Aerobic Blood Culture - Preliminary





 Blood - Venous    NO GROWTH AFTER 1 DAY





 Anaerobic Blood Culture - Preliminary





    NO GROWTH AFTER 1 DAY


 


 04/06/21 13:10 Urine Culture - Preliminary





 Urine, Quick Cath (In-Out)    Gram Negative Rods











Med Orders - Current: 


                               Current Medications





Acetaminophen (Acetaminophen 325 Mg Tab)  650 mg PO Q4H PRN


   PRN Reason: Pain (Mild 1-3)/fever


Acetaminophen (Acetaminophen 650 Mg Supp)  650 mg RECTAL Q4H PRN


   PRN Reason: Pain (mild 1-3)


Albuterol (Albuterol 0.083% 2.5 Mg/3 Ml Neb Soln)  2.5 mg NEB Q2H PRN


   PRN Reason: Shortness Of Breath/wheezing


Albuterol/Ipratropium (Albuterol/Ipratropium 3.0-0.5 Mg/3 Ml Neb Soln)  3 ml NEB

Q4H PRN


   PRN Reason: Shortness Of Breath/wheezing


   Last Admin: 04/08/21 01:46 Dose:  3 ml


   Documented by: 


Heparin Sodium (Porcine) (Heparin Sodium 5,000 Units/Ml Vial)  5,000 units 

SUBCUT Q12H ANA


   Last Admin: 04/08/21 05:59 Dose:  5,000 units


   Documented by: 


Ceftriaxone Sodium 2 gm/ (Sodium Chloride)  100 mls @ 200 mls/hr IV Q24H ANA


   Last Admin: 04/07/21 12:16 Dose:  200 mls/hr


   Documented by: 


Potassium Chloride 10 meq/ (Premix)  100 mls @ 100 mls/hr IV Q1H [facility]


   Stop: 04/08/21 11:44


Insulin Glargine (Insulin Glarg,Human.Rec.Analog 100 Unit/Ml)  7 unit SUBCUT 

DAILY [facility]


   Last Admin: 04/07/21 16:16 Dose:  7 units


   Documented by: 


Insulin Human Lispro (Insulin Lispro 100 Unit/Ml)  0 unit SUBCUT QIDACANDBED 

[facility]; Protocol


   Last Admin: 04/08/21 06:00 Dose:  2 unit


   Documented by: 


Pantoprazole Sodium (Pantoprazole 40 Mg Vial)  40 mg IVPUSH DAILY [facility]


   Last Admin: 04/07/21 08:45 Dose:  40 mg


   Documented by: 


Sodium Chloride (Sodium Chloride 0.9% 10 Ml Syringe)  10 ml FLUSH ASDIRECTED PRN


   PRN Reason: Keep Vein Open


   Last Admin: 04/06/21 13:11 Dose:  10 ml


   Documented by: 





Discontinued Medications





Acetaminophen (Acetaminophen 650 Mg Supp)  650 mg RECTAL NOW ONE


   Stop: 04/06/21 14:30


   Last Admin: 04/06/21 15:05 Dose:  650 mg


   Documented by: 


Furosemide (Furosemide 20 Mg/2 Ml Vial)  20 mg IVPUSH ONETIME ONE


   Stop: 04/07/21 00:04


   Last Admin: 04/07/21 00:27 Dose:  20 mg


   Documented by: 


Furosemide (Furosemide 40 Mg/4 Ml Vial)  40 mg IVPUSH NOW ONE


   Stop: 04/07/21 08:31


   Last Admin: 04/07/21 08:42 Dose:  40 mg


   Documented by: 


Furosemide (Furosemide 20 Mg/2 Ml Vial)  20 mg IVPUSH ONETIME ONE


   Stop: 04/07/21 22:58


   Last Admin: 04/07/21 23:06 Dose:  20 mg


   Documented by: 


Ceftriaxone Sodium 2 gm/ (Sodium Chloride)  100 mls @ 200 mls/hr IV ONETIME ONE


   Stop: 04/06/21 13:34


   Last Admin: 04/06/21 13:11 Dose:  200 mls/hr


   Documented by: 


Sodium Chloride (Normal Saline)  1,000 mls @ 125 mls/hr IV ASDIRECTED [facility]


   Last Infusion: 04/06/21 14:00 Dose:  999 mls/hr


   Documented by: 


Sodium Chloride (Normal Saline)  1,000 mls @ 1,000 mls/hr IV ONETIME ONE


   Stop: 04/06/21 14:55


   Last Admin: 04/06/21 15:05 Dose:  1,000 mls/hr


   Documented by: 


Sodium Chloride (Normal Saline)  1,000 mls @ 125 mls/hr IV ASDIRECTED [facility]


   Last Admin: 04/06/21 20:31 Dose:  150 mls/hr


   Documented by: 


Potassium Chloride 10 meq/ (Premix)  100 mls @ 100 mls/hr IV Q1H [facility]


   Stop: 04/07/21 04:14


   Last Admin: 04/07/21 03:35 Dose:  100 mls/hr


   Documented by: 


Sodium Chloride (Sodium Chloride 0.45%)  1,000 mls @ 125 mls/hr IV ASDIRECTED 

[facility]


   Last Infusion: 04/07/21 06:00 Dose:  75 mls/hr


   Documented by: 


Potassium Chloride 20 meq/ (Dextrose/Water)  1,010 mls @ 75.75 mls/hr IV Q13H 

[facility]


   Last Admin: 04/08/21 00:27 Dose:  75.75 mls/hr


   Documented by: 


Insulin Human Lispro (Insulin Lispro 100 Unit/Ml)  8 unit SUBCUT ONETIME ONE


   Stop: 04/06/21 17:16


   Last Admin: 04/06/21 18:05 Dose:  8 units


   Documented by: 


Insulin Human Lispro (Insulin Lispro 100 Units/Ml 3 Ml Vial)  0 unit SUBCUT 

QIDACANDBED [facility]; Protocol


Insulin Human Lispro (Insulin Lispro 100 Unit/Ml)  0 unit SUBCUT QIDACANDBED 

[facility]; Protocol


   Last Admin: 04/06/21 23:10 Dose:  5 units


   Documented by: 


Insulin Human Lispro (Insulin Lispro 100 Unit/Ml)  10 unit SUBCUT ONETIME ONE


   Stop: 04/06/21 20:21


   Last Admin: 04/06/21 20:28 Dose:  10 units


   Documented by: 


Insulin Human Lispro (Insulin Lispro 100 Unit/Ml)  2 unit SUBCUT ONETIME ONE; 

Protocol


   Stop: 04/07/21 02:11


   Last Admin: 04/07/21 02:29 Dose:  2 units


   Documented by: 


Pantoprazole Sodium (Pantoprazole 40 Mg Tab.Cr)  40 mg PO DAILY@0700 [facility]


   Last Admin: 04/07/21 06:13 Dose:  Not Given


   Documented by: 











- Exam


Quality Assessment: No: Supplemental Oxygen


General: Alert


HEENT: Pupils Equal, Mucous Membr. Moist/Pink


Neck: Supple


Lungs: Clear to Auscultation, Normal Respiratory Effort


Cardiovascular: Regular Rate, Regular Rhythm


GI/Abdominal Exam: Normal Bowel Sounds, Soft, Non-Tender, No Distention


Extremities: Normal Inspection, Non-Tender, No Pedal Edema, Normal Capillary 

Refill


Skin: Warm, Dry, Intact





- Patient Data


Lab Results Last 24 hrs: 


                         Laboratory Results - last 24 hr











  04/07/21 04/07/21 04/07/21 Range/Units





  08:17 11:27 16:10 


 


WBC     (3.98-10.04)  K/mm3


 


RBC     (3.98-5.22)  M/mm3


 


Hgb     (11.2-15.7)  gm/dl


 


Hct     (34.1-44.9)  %


 


MCV     (79.4-94.8)  fl


 


MCH     (25.6-32.2)  pg


 


MCHC     (32.2-35.5)  g/dl


 


RDW Std Deviation     (36.4-46.3)  fL


 


Plt Count     (182-369)  K/mm3


 


MPV     (9.4-12.3)  fl


 


Neut % (Auto)     (34.0-71.1)  %


 


Lymph % (Auto)     (19.3-51.7)  %


 


Mono % (Auto)     (4.7-12.5)  %


 


Eos % (Auto)     (0.7-5.8)  


 


Baso % (Auto)     (0.1-1.2)  %


 


Neut # (Auto)     (1.56-6.13)  K/mm3


 


Lymph # (Auto)     (1.18-3.74)  K/mm3


 


Mono # (Auto)     (0.24-0.36)  K/mm3


 


Eos # (Auto)     (0.04-0.36)  K/mm3


 


Baso # (Auto)     (0.01-0.08)  K/mm3


 


Sodium     (136-145)  mEq/L


 


Potassium     (3.5-5.1)  mEq/L


 


Chloride     ()  mEq/L


 


Carbon Dioxide     (21-32)  mEq/L


 


Anion Gap     (5-15)  


 


BUN     (7-18)  mg/dL


 


Creatinine     (0.55-1.02)  mg/dL


 


Est Cr Clr Drug Dosing     mL/min


 


Estimated GFR (MDRD)     (>60)  mL/min


 


BUN/Creatinine Ratio     (14-18)  


 


Glucose     ()  mg/dL


 


POC Glucose  145 H  242 H  275 H  ()  mg/dL


 


Calcium     (8.5-10.1)  mg/dL


 


Phosphorus     (2.6-4.7)  mg/dL


 


Magnesium     (1.8-2.4)  mg/dl


 


Total Bilirubin     (0.2-1.0)  mg/dL


 


AST     (15-37)  U/L


 


ALT     (14-59)  U/L


 


Alkaline Phosphatase     ()  U/L


 


C-Reactive Protein     (<1.0)  mg/dL


 


Total Protein     (6.4-8.2)  g/dl


 


Albumin     (3.4-5.0)  g/dl


 


Globulin     gm/dL


 


Albumin/Globulin Ratio     (1-2)  


 


MRSA (PCR)     














  04/07/21 04/07/21 04/07/21 Range/Units





  18:02 20:30 21:20 


 


WBC     (3.98-10.04)  K/mm3


 


RBC     (3.98-5.22)  M/mm3


 


Hgb     (11.2-15.7)  gm/dl


 


Hct     (34.1-44.9)  %


 


MCV     (79.4-94.8)  fl


 


MCH     (25.6-32.2)  pg


 


MCHC     (32.2-35.5)  g/dl


 


RDW Std Deviation     (36.4-46.3)  fL


 


Plt Count     (182-369)  K/mm3


 


MPV     (9.4-12.3)  fl


 


Neut % (Auto)     (34.0-71.1)  %


 


Lymph % (Auto)     (19.3-51.7)  %


 


Mono % (Auto)     (4.7-12.5)  %


 


Eos % (Auto)     (0.7-5.8)  


 


Baso % (Auto)     (0.1-1.2)  %


 


Neut # (Auto)     (1.56-6.13)  K/mm3


 


Lymph # (Auto)     (1.18-3.74)  K/mm3


 


Mono # (Auto)     (0.24-0.36)  K/mm3


 


Eos # (Auto)     (0.04-0.36)  K/mm3


 


Baso # (Auto)     (0.01-0.08)  K/mm3


 


Sodium     (136-145)  mEq/L


 


Potassium     (3.5-5.1)  mEq/L


 


Chloride     ()  mEq/L


 


Carbon Dioxide     (21-32)  mEq/L


 


Anion Gap     (5-15)  


 


BUN     (7-18)  mg/dL


 


Creatinine     (0.55-1.02)  mg/dL


 


Est Cr Clr Drug Dosing     mL/min


 


Estimated GFR (MDRD)     (>60)  mL/min


 


BUN/Creatinine Ratio     (14-18)  


 


Glucose     ()  mg/dL


 


POC Glucose  273 H   245 H  ()  mg/dL


 


Calcium     (8.5-10.1)  mg/dL


 


Phosphorus     (2.6-4.7)  mg/dL


 


Magnesium     (1.8-2.4)  mg/dl


 


Total Bilirubin     (0.2-1.0)  mg/dL


 


AST     (15-37)  U/L


 


ALT     (14-59)  U/L


 


Alkaline Phosphatase     ()  U/L


 


C-Reactive Protein     (<1.0)  mg/dL


 


Total Protein     (6.4-8.2)  g/dl


 


Albumin     (3.4-5.0)  g/dl


 


Globulin     gm/dL


 


Albumin/Globulin Ratio     (1-2)  


 


MRSA (PCR)   Negative   














  04/08/21 04/08/21 04/08/21 Range/Units





  05:05 05:35 05:50 


 


WBC  10.83 H    (3.98-10.04)  K/mm3


 


RBC  3.98    (3.98-5.22)  M/mm3


 


Hgb  11.8    (11.2-15.7)  gm/dl


 


Hct  37.4    (34.1-44.9)  %


 


MCV  94.0    (79.4-94.8)  fl


 


MCH  29.6    (25.6-32.2)  pg


 


MCHC  31.6 L    (32.2-35.5)  g/dl


 


RDW Std Deviation  46.1    (36.4-46.3)  fL


 


Plt Count  221    (182-369)  K/mm3


 


MPV  11.8    (9.4-12.3)  fl


 


Neut % (Auto)  73.6 H    (34.0-71.1)  %


 


Lymph % (Auto)  17.5 L    (19.3-51.7)  %


 


Mono % (Auto)  7.4    (4.7-12.5)  %


 


Eos % (Auto)  0.9    (0.7-5.8)  


 


Baso % (Auto)  0.2    (0.1-1.2)  %


 


Neut # (Auto)  7.97 H    (1.56-6.13)  K/mm3


 


Lymph # (Auto)  1.90    (1.18-3.74)  K/mm3


 


Mono # (Auto)  0.80 H    (0.24-0.36)  K/mm3


 


Eos # (Auto)  0.10    (0.04-0.36)  K/mm3


 


Baso # (Auto)  0.02    (0.01-0.08)  K/mm3


 


Sodium    137  D  (136-145)  mEq/L


 


Potassium    3.2 L  (3.5-5.1)  mEq/L


 


Chloride    105  ()  mEq/L


 


Carbon Dioxide    18 L  (21-32)  mEq/L


 


Anion Gap    17.2 H  (5-15)  


 


BUN    27 H  (7-18)  mg/dL


 


Creatinine    1.4 H  (0.55-1.02)  mg/dL


 


Est Cr Clr Drug Dosing    21.78  mL/min


 


Estimated GFR (MDRD)    35  (>60)  mL/min


 


BUN/Creatinine Ratio    19.3 H  (14-18)  


 


Glucose    236 H  ()  mg/dL


 


POC Glucose   245 H   ()  mg/dL


 


Calcium    7.8 L  (8.5-10.1)  mg/dL


 


Phosphorus    2.1 L  (2.6-4.7)  mg/dL


 


Magnesium    1.8  (1.8-2.4)  mg/dl


 


Total Bilirubin    0.5  (0.2-1.0)  mg/dL


 


AST    26  (15-37)  U/L


 


ALT    37  (14-59)  U/L


 


Alkaline Phosphatase    79  ()  U/L


 


C-Reactive Protein    17.2 H*  (<1.0)  mg/dL


 


Total Protein    6.8  (6.4-8.2)  g/dl


 


Albumin    2.6 L  (3.4-5.0)  g/dl


 


Globulin    4.2  gm/dL


 


Albumin/Globulin Ratio    0.6 L  (1-2)  


 


MRSA (PCR)     











Result Diagrams: 


                                 04/08/21 05:05





                                 04/08/21 05:50


Chris Results Last 24 hrs: 


                                  Microbiology











 04/06/21 13:03 Aerobic Blood Culture - Preliminary





 Blood - Venous - Lab Draw    NO GROWTH AFTER 1 DAY





 Anaerobic Blood Culture - Preliminary





    NO GROWTH AFTER 1 DAY


 


 04/06/21 12:55 Aerobic Blood Culture - Preliminary





 Blood - Venous    NO GROWTH AFTER 1 DAY





 Anaerobic Blood Culture - Preliminary





    NO GROWTH AFTER 1 DAY


 


 04/06/21 13:10 Urine Culture - Preliminary





 Urine, Quick Cath (In-Out)    Gram Negative Rods














Sepsis Event Note





- Evaluation


Sepsis Screening Result: No Definite Risk





- Focused Exam


Vital Signs: 


                                   Vital Signs











  Temp Resp BP BP Pulse Ox Pulse Ox Pulse Ox


 


 04/08/21 06:46       2 L 


 


 04/08/21 04:00  97.3 F  14  100/52 L   95  


 


 04/08/21 03:08        95


 


 04/08/21 03:01        97


 


 04/08/21 02:34        95


 


 04/08/21 01:47       90 L 


 


 04/08/21 00:00  97.6 F  17   133/62  92 L  


 


 04/07/21 22:55   14   131/70  88 L  


 


 04/07/21 20:00  98.7 F  15   133/76  90 L  














- Problem List & Annotations


(1) Severe sepsis


SNOMED Code(s): 25166918


   Code(s): A41.9 - SEPSIS, UNSPECIFIED ORGANISM; R65.20 - SEVERE SEPSIS WITHOUT

SEPTIC SHOCK   Status: Acute   Current Visit: Yes   





(2) Acute on chronic renal failure


SNOMED Code(s): 636687373


   Code(s): N17.9 - ACUTE KIDNEY FAILURE, UNSPECIFIED; N18.9 - CHRONIC KIDNEY 

DISEASE, UNSPECIFIED   Status: Acute   Current Visit: Yes   





(3) Hyperglycemia


SNOMED Code(s): 89036263


   Code(s): R73.9 - HYPERGLYCEMIA, UNSPECIFIED   Status: Acute   Current Visit: 

Yes   





(4) Advanced dementia


SNOMED Code(s): 11449087


   Code(s): F03.90 - UNSPECIFIED DEMENTIA WITHOUT BEHAVIORAL DISTURBANCE   

Status: Acute   Current Visit: Yes   





(5) Renal insufficiency


SNOMED Code(s): 826024855, 335494971


   Code(s): N28.9 - DISORDER OF KIDNEY AND URETER, UNSPECIFIED   Status: Acute  

Current Visit: Yes   





- Problem List Review


Problem List Initiated/Reviewed/Updated: Yes





- My Orders


Last 24 Hours: 


My Active Orders





04/07/21 09:00


Pantoprazole [ProTONIX IV***]   40 mg IVPUSH DAILY 





04/07/21 10:39


SLP Evaluation and Treatment [CONS] Routine 





04/07/21 Lunch


Pureed Diet [DIET] 





04/07/21 13:00


cefTRIAXone [Rocephin] 2 gm   Sodium Chloride 0.9% [Normal Saline] 100 ml IV 

Q24H 





04/07/21 15:15


Insulin Glarg,Human.Rec.Analog [LantUS]   7 unit SUBCUT DAILY 





04/08/21 07:45


Potassium Chloride [KCl in Water 10 MEQ/100 ML] 10 meq   Premix Bag 1 bag IV Q1H







04/09/21 05:11


C-REACTIVE PROTEIN [CHEM] AM 


CBC WITH AUTO DIFF [HEME] AM 


CMP [COMPREHENSIVE METABOLIC PN,CMP] [CHEM] AM 


MAGNESIUM [CHEM] AM 


PHOSPHORUS [CHEM] AM 





04/10/21 05:11


C-REACTIVE PROTEIN [CHEM] AM 


CBC WITH AUTO DIFF [HEME] AM 


CMP [COMPREHENSIVE METABOLIC PN,CMP] [CHEM] AM 


MAGNESIUM [CHEM] AM 


PHOSPHORUS [CHEM] AM 





04/11/21 05:11


C-REACTIVE PROTEIN [CHEM] AM 


CBC WITH AUTO DIFF [HEME] AM 


CMP [COMPREHENSIVE METABOLIC PN,CMP] [CHEM] AM 


MAGNESIUM [CHEM] AM 


PHOSPHORUS [CHEM] AM 














- Plan


Plan:: 





Assessment


88-year-old female with severe dementia presents with severe sepsis secondary to

 UTI.


Severe sepsis


Complicated UTI


* On presentation to the emergency department patient was febrile, tachycardic, 

  tachypneic, and hypoxemia


* Lactic acidosis resolved


* Anion gap improved to 15.6-->17.2


* WBC 6.4-->10.8


* CRP [phone]


* UA: WBC greater than 100


* Urine culture result:


* Given 30 mL/kg IV fluid bolus


Plan


* Change to MSP status


* Rocephin 2 g every 24 hours


* change fluids to NS from D5


* Discussed with daughter high mortality rate with severe sepsis and 88-year-old

   with multiple medical problems.  We will keep her informed of any 

  complications occur during treatment.


* If white count continues to increase or she becomes febrile mean need to CT 

  her abdomen and pelvis for possible abscess.


Episode of hypoxemia, possible aspiration


* Get chest x-ray 


* This could be because of the change in white count and CRP.


Chronic renal insufficiencystable


* Secondary to sepsis


* Creatinine 1.4


* No recent renal function available, but in June 2017 estimated GFR was 47


* Poor urinary output overnight and received Lasix 20 mg x 1 IV


Plan


* Switch IV fluids to D5W secondary to hyponatremia


* Follow kidney function closely


* Renally dose medications and avoid nephrotoxic medications


Hyperglycemia, history of prediabetes


* Blood sugars are improved on sliding scale.


* Hemoglobin A1c 8.5


* Switch to normal saline


Plan


* Sliding scale insulin


* Bedside glucose monitoring 4 times daily


* Lantus 10 units daily. 


CHF


* proBNP on admission was 8711


* Chest x-ray shows no acute findings


* proBNP likely artificially elevated secondary to renal dysfunction, but still 

  significantly elevated


* Fluid bolus secondary to sepsis going to exacerbate CHF


* Patient is at high risk for pulmonary edema


Plan


* Currently on 4 L nasal cannula


* Marroquin catheter for strict I's and O's will for sepsis and CHF


* Monitor respiratory status closely


* Careful rehydration


* Lasix as needed


Hypernatremia-resolved


* Sodium 137


Plan


* NS secondary to diabetes


* Follow sodium daily


Chronic: Hypertension, GERD, colitis, chronic renal insufficiency, anxiety, 

dementia, depression, vitamin D deficiencies, behavioral disturbances, anemia





VTE prophylaxis with heparin


CODE STATUS: DNR/DNI


Prognosis is very poor.  Discussed with daughter who is POA and she voices 

understanding.

## 2021-04-09 VITALS — HEART RATE: 68 BPM | DIASTOLIC BLOOD PRESSURE: 56 MMHG | SYSTOLIC BLOOD PRESSURE: 117 MMHG

## 2021-04-09 RX ADMIN — INSULIN LISPRO SCH: 100 INJECTION, SOLUTION INTRAVENOUS; SUBCUTANEOUS at 06:31

## 2021-04-09 RX ADMIN — HEPARIN SODIUM SCH UNITS: 5000 INJECTION, SOLUTION INTRAVENOUS; SUBCUTANEOUS at 06:30

## 2021-04-09 RX ADMIN — INSULIN GLARGINE SCH UNITS: 100 INJECTION, SOLUTION SUBCUTANEOUS at 08:26

## 2021-04-09 RX ADMIN — INSULIN LISPRO SCH UNIT: 100 INJECTION, SOLUTION INTRAVENOUS; SUBCUTANEOUS at 12:26

## 2021-04-09 NOTE — [id number]
**Discharge Summary





- Hospital Course


HPI Initial Comments: 





88-year-old female with severe dementia presents via EMS from her residence at 

[facility] with a fever, decreased oral intake, and congestion 

starting this morning.  Per her daughter and the emergency department notes this

morning she had decreasing appetite and oral intake, increased heart rate and 

fever.  Patient was sent to the emergency department where she was found to have

a fever of 102, heart rate of 110, respiratory rate of 36, and oxygen 

saturations of 91% on 4 L.  Patient is nonverbal and unable to communicate 

secondary to severe dementia at baseline.


In the emergency department patient was found to have a severe UTI with WBCs 

greater than 100 on UA.  Chest x-ray was clear.  Other labs of significance were

normal WBC of 8.35 with no left shift.  Anion gap was significantly elevated at 

21 and kidney function was significantly reduced with an estimated GFR of 21, 

creatinine 2.2, BUN 43.  She was acidotic with a bicarb of 20.  Lactic acid was 

5.2.  Of note her glucose was 489.  Her daughter states that they just recently 

diagnosed her with prediabetes and apparently she was started on Glucophage.  

Patient was given a fluid bolus of 30 mL/kg and started on Rocephin 2 g.  Repeat

lactic acid 3 hours later was 2.8.





Assessment


88-year-old female with severe dementia presents with severe sepsis secondary to

UTI.


Severe sepsis


Complicated UTI


* On presentation to the emergency department patient was febrile, tachycardic, 

  tachypneic, and hypoxemia


* Initial lactic acid was 5.2 with a repeat of 2.8 3 hours later and after fluid

  bolus.


* Acute on chronic renal insufficiency, lactic acid, elevated BNP showing organ 

  dysfunction


* Anion gap elevated at 21 and serum bicarb of 20


* Normal white count of 8.35, C-reactive protein 19.2


* UA: WBC greater than 100


* Started on Rocephin in the ER after getting blood cultures and urine culture


* Given 30 mL/kg IV fluid bolus


Plan


* Admit to ICU


* Follow lactic acid


* Normal saline at 125 mL/h


* Rocephin 2 g every 24 hours


* Await blood and urine cultures


* Discussed with daughter high mortality rate with severe sepsis and 88-year-old

  with multiple medical problems.  We will keep her informed of any complicatio

  ns occur during treatment.


Acute on chronic renal insufficiency


* Secondary to sepsis


* Creatinine 2.2 and estimated GFR of 21


* No recent renal function available, but in June 2017 estimated GFR was 47


Plan


* Continue IV fluids


* Follow kidney function closely


* Renally dose medications and avoid nephrotoxic medications


Hyperglycemia, history of prediabetes


* Blood sugar 489 on presentation


* Unknown hemoglobin A1c


* Fluid bolus done in ER


Plan


* Sliding scale insulin


* Bedside glucose monitoring 4 times daily


* Get hemoglobin A1c


CHF


* proBNP is 8711


* Chest x-ray shows no acute findings


* proBNP likely artificially elevated secondary to renal dysfunction, but still 

  significantly elevated


* Fluid bolus secondary to sepsis going to exacerbate CHF


* Patient is at high risk for pulmonary edema


Plan


* Currently on 4 L nasal cannula


* Marroquin catheter for strict I's and O's will for sepsis and CHF


* Monitor respiratory status closely





Chronic: Hypertension, GERD, colitis, chronic renal insufficiency, anxiety, 

dementia, depression, vitamin D deficiencies, behavioral disturbances, anemia





VTE prophylaxis with heparin


CODE STATUS: DNR/DNI


Prognosis is very poor.  Discussed with daughter who is POA and she voices 

understanding.





- Mortality Measure


Prognosis:: Poor (High risk for mortality)


Diagnosis: Stroke: No





- Discharge Data


Discharge Date: 04/09/21


Discharge Disposition: Home, Self-Care 01


Condition: Good





- Referral to Home Health


Primary Care Physician: 


PCP None








- Discharge Diagnosis/Problem(s)


(1) Severe sepsis


SNOMED Code(s): 45699334


   ICD Code: A41.9 - SEPSIS, UNSPECIFIED ORGANISM; R65.20 - SEVERE SEPSIS 

WITHOUT SEPTIC SHOCK   Status: Acute   Current Visit: Yes   





(2) Acute on chronic renal failure


SNOMED Code(s): 037122690


   ICD Code: N17.9 - ACUTE KIDNEY FAILURE, UNSPECIFIED; N18.9 - CHRONIC KIDNEY 

DISEASE, UNSPECIFIED   Status: Acute   Current Visit: Yes   





(3) Hyperglycemia


SNOMED Code(s): 25356316


   ICD Code: R73.9 - HYPERGLYCEMIA, UNSPECIFIED   Status: Acute   Current Visit:

Yes   





(4) Advanced dementia


SNOMED Code(s): 70879025


   ICD Code: F03.90 - UNSPECIFIED DEMENTIA WITHOUT BEHAVIORAL DISTURBANCE   

Status: Acute   Current Visit: Yes   





(5) Renal insufficiency


SNOMED Code(s): 073796108, 483302922


   ICD Code: N28.9 - DISORDER OF KIDNEY AND URETER, UNSPECIFIED   Status: Acute 

 Current Visit: Yes   





- Patient Summary/Data


Consults: 


                                  Consultations





04/07/21 10:39


SLP Evaluation and Treatment [CONS] Routine 











Hospital Course: 





Patient was admitted and given fluid bolus and continued on IV fluids to help 

decrease her lactic acid.  Lactic acid did decrease to 2.0.  Urine culture grew 

out E. coli that was sensitive to Rocephin.  Patient did have some issues with 

poor urine output, but with continued fluids this resolved.  At time of 

discharge her creatinine improved from 2.2 to1.2.  Patient be discharged on 

Keflex 500 mg 3 times daily for 5 additional days.





Patient did have one episode of hypoxia secondary to a mucous plug on the second

 night.  After suctioning this resolved and no recurrence occurred.  Chest x-ray

 showed some slight atelectasis within the right lung base otherwise no acute 

findings.





- Patient Instructions


Diet: Diabetic Diet


Driving: Do Not Drive


Other/Special Instructions: Follow-up with primary care provider next week.





- Discharge Plan


*PRESCRIPTION DRUG MONITORING PROGRAM REVIEWED*: No


*COPY OF PRESCRIPTION DRUG MONITORING REPORT IN PATIENT MICHAEL: No


Prescriptions/Med Rec: 


cephALEXin [Keflex] 500 mg PO Q8H #15 cap


Home Medications: 


                                    Home Meds





Acetaminophen 500 mg PO TID PRN 04/06/21 [History]


Citalopram Hydrobromide [Citalopram HBr] 10 mg PO BEDTIME 04/06/21 [History]


Cyanocobalamin (Vitamin B-12) [B-12] 1,000 mcg PO BEDTIME 04/06/21 [History]


Docusate Sodium/Sennosides [Senna Plus] 1 tab PO BID 04/06/21 [History]


Ferrous Sulfate 325 mg PO BEDTIME 04/06/21 [History]


Gabapentin 600 mg PO BEDTIME 04/06/21 [History]


Lactobacillus Acidophilus/Fos [Acidophilus Probiotic Tablet] 1 tab PO BID 

04/06/21 [History]


Magnesium Hydroxide [Milk of Magnesia] 30 ml PO TID PRN 04/06/21 [History]


Memantine [Namenda Xr] 14 mg PO DAILY 04/06/21 [History]


Omeprazole 20 mg PO ACBREAKFAST 04/06/21 [History]


Ondansetron [Zofran ODT] 4 mg PO Q4H PRN 04/06/21 [History]


bisacodyL [Dulcolax] 10 mg PO DAILY PRN 04/06/21 [History]


bisacodyL [Dulcolax] 10 mg RECTAL DAILY PRN 04/06/21 [History]


metFORMIN [Glucophage] 250 mg PO BID 04/06/21 [History]


polyethylene glycoL 3350 [MiraLAX] 17 gm PO DAILY 04/06/21 [History]


cephALEXin [Keflex] 500 mg PO Q8H #15 cap 04/09/21 [Rx]








Patient Handouts:  Sepsis, Diagnosis, Adult, Urinary Tract Infection, Adult, 

Sepsis, Self Care, Adult


Forms:  ED Department Discharge


Referrals: 


Alexander Kline MD [Ordering Only Provider] - 04/19/21 11:15 am (Hospital

follow-up appointment.)





- Discharge Summary/Plan Comment


DC Time >30 min.: Yes





- General Info


Date of Service: 04/09/21


Admission Dx/Problem (Free Text: 


                           Admission Diagnosis/Problem





Admission Diagnosis/Problem      Sepsis








Subjective Update: 





Patient is noncommunicative, but nursing reports no change overnight.





- Patient Data


Vitals - Most Recent: 


                                Last Vital Signs











Temp  97.9 F   04/09/21 08:00


 


Pulse  66   04/09/21 08:00


 


Resp  19   04/09/21 08:00


 


BP  117/53 L  04/09/21 08:00


 


Pulse Ox  94 L  04/09/21 08:00











Weight - Most Recent: 131 lb


I&O - Last 24 hours: 


                                 Intake & Output











 04/08/21 04/09/21 04/09/21





 22:59 06:59 14:59


 


Intake Total 1727 1281 


 


Output Total 205 260 175


 


Balance 1522 1021 -175











Lab Results - Last 24 hrs: 


                         Laboratory Results - last 24 hr











  04/08/21 04/08/21 04/08/21 Range/Units





  10:50 17:25 21:52 


 


WBC     (3.98-10.04)  K/mm3


 


RBC     (3.98-5.22)  M/mm3


 


Hgb     (11.2-15.7)  gm/dl


 


Hct     (34.1-44.9)  %


 


MCV     (79.4-94.8)  fl


 


MCH     (25.6-32.2)  pg


 


MCHC     (32.2-35.5)  g/dl


 


RDW Std Deviation     (36.4-46.3)  fL


 


Plt Count     (182-369)  K/mm3


 


MPV     (9.4-12.3)  fl


 


Neut % (Auto)     (34.0-71.1)  %


 


Lymph % (Auto)     (19.3-51.7)  %


 


Mono % (Auto)     (4.7-12.5)  %


 


Eos % (Auto)     (0.7-5.8)  


 


Baso % (Auto)     (0.1-1.2)  %


 


Neut # (Auto)     (1.56-6.13)  K/mm3


 


Lymph # (Auto)     (1.18-3.74)  K/mm3


 


Mono # (Auto)     (0.24-0.36)  K/mm3


 


Eos # (Auto)     (0.04-0.36)  K/mm3


 


Baso # (Auto)     (0.01-0.08)  K/mm3


 


Sodium     (136-145)  mEq/L


 


Potassium     (3.5-5.1)  mEq/L


 


Chloride     ()  mEq/L


 


Carbon Dioxide     (21-32)  mEq/L


 


Anion Gap     (5-15)  


 


BUN     (7-18)  mg/dL


 


Creatinine     (0.55-1.02)  mg/dL


 


Est Cr Clr Drug Dosing     mL/min


 


Estimated GFR (MDRD)     (>60)  mL/min


 


BUN/Creatinine Ratio     (14-18)  


 


Glucose     ()  mg/dL


 


POC Glucose   180 H  221 H  ()  mg/dL


 


Calcium     (8.5-10.1)  mg/dL


 


Phosphorus     (2.6-4.7)  mg/dL


 


Magnesium     (1.8-2.4)  mg/dl


 


Total Bilirubin     (0.2-1.0)  mg/dL


 


AST     (15-37)  U/L


 


ALT     (14-59)  U/L


 


Alkaline Phosphatase     ()  U/L


 


C-Reactive Protein     (<1.0)  mg/dL


 


Total Protein     (6.4-8.2)  g/dl


 


Albumin     (3.4-5.0)  g/dl


 


Globulin     gm/dL


 


Albumin/Globulin Ratio     (1-2)  


 


Procalcitonin  0.92 H    ng/mL


 


SARS-CoV-2 RNA (YASH)     (NEGATIVE)  














  04/09/21 04/09/21 04/09/21 Range/Units





  05:29 05:29 06:28 


 


WBC  8.26    (3.98-10.04)  K/mm3


 


RBC  3.14 L    (3.98-5.22)  M/mm3


 


Hgb  9.3 L D    (11.2-15.7)  gm/dl


 


Hct  29.9 L    (34.1-44.9)  %


 


MCV  95.2 H    (79.4-94.8)  fl


 


MCH  29.6    (25.6-32.2)  pg


 


MCHC  31.1 L    (32.2-35.5)  g/dl


 


RDW Std Deviation  46.4 H    (36.4-46.3)  fL


 


Plt Count  189    (182-369)  K/mm3


 


MPV  11.6    (9.4-12.3)  fl


 


Neut % (Auto)  63.9    (34.0-71.1)  %


 


Lymph % (Auto)  23.5    (19.3-51.7)  %


 


Mono % (Auto)  7.5    (4.7-12.5)  %


 


Eos % (Auto)  4.2    (0.7-5.8)  


 


Baso % (Auto)  0.1    (0.1-1.2)  %


 


Neut # (Auto)  5.27    (1.56-6.13)  K/mm3


 


Lymph # (Auto)  1.94    (1.18-3.74)  K/mm3


 


Mono # (Auto)  0.62 H    (0.24-0.36)  K/mm3


 


Eos # (Auto)  0.35    (0.04-0.36)  K/mm3


 


Baso # (Auto)  0.01    (0.01-0.08)  K/mm3


 


Sodium   142   (136-145)  mEq/L


 


Potassium   3.6   (3.5-5.1)  mEq/L


 


Chloride   109 H   ()  mEq/L


 


Carbon Dioxide   21   (21-32)  mEq/L


 


Anion Gap   15.6 H   (5-15)  


 


BUN   22 H   (7-18)  mg/dL


 


Creatinine   1.2 H   (0.55-1.02)  mg/dL


 


Est Cr Clr Drug Dosing   25.63   mL/min


 


Estimated GFR (MDRD)   42   (>60)  mL/min


 


BUN/Creatinine Ratio   18.3 H   (14-18)  


 


Glucose   121 H   ()  mg/dL


 


POC Glucose    117 H  ()  mg/dL


 


Calcium   7.5 L   (8.5-10.1)  mg/dL


 


Phosphorus   2.2 L   (2.6-4.7)  mg/dL


 


Magnesium   1.7 L   (1.8-2.4)  mg/dl


 


Total Bilirubin   0.5   (0.2-1.0)  mg/dL


 


AST   51 H   (15-37)  U/L


 


ALT   53   (14-59)  U/L


 


Alkaline Phosphatase   93   ()  U/L


 


C-Reactive Protein   17.7 H*   (<1.0)  mg/dL


 


Total Protein   5.7 L   (6.4-8.2)  g/dl


 


Albumin   2.1 L   (3.4-5.0)  g/dl


 


Globulin   3.6   gm/dL


 


Albumin/Globulin Ratio   0.6 L   (1-2)  


 


Procalcitonin     ng/mL


 


SARS-CoV-2 RNA (YASH)     (NEGATIVE)  














  04/09/21 04/09/21 Range/Units





  10:54 12:21 


 


WBC    (3.98-10.04)  K/mm3


 


RBC    (3.98-5.22)  M/mm3


 


Hgb    (11.2-15.7)  gm/dl


 


Hct    (34.1-44.9)  %


 


MCV    (79.4-94.8)  fl


 


MCH    (25.6-32.2)  pg


 


MCHC    (32.2-35.5)  g/dl


 


RDW Std Deviation    (36.4-46.3)  fL


 


Plt Count    (182-369)  K/mm3


 


MPV    (9.4-12.3)  fl


 


Neut % (Auto)    (34.0-71.1)  %


 


Lymph % (Auto)    (19.3-51.7)  %


 


Mono % (Auto)    (4.7-12.5)  %


 


Eos % (Auto)    (0.7-5.8)  


 


Baso % (Auto)    (0.1-1.2)  %


 


Neut # (Auto)    (1.56-6.13)  K/mm3


 


Lymph # (Auto)    (1.18-3.74)  K/mm3


 


Mono # (Auto)    (0.24-0.36)  K/mm3


 


Eos # (Auto)    (0.04-0.36)  K/mm3


 


Baso # (Auto)    (0.01-0.08)  K/mm3


 


Sodium    (136-145)  mEq/L


 


Potassium    (3.5-5.1)  mEq/L


 


Chloride    ()  mEq/L


 


Carbon Dioxide    (21-32)  mEq/L


 


Anion Gap    (5-15)  


 


BUN    (7-18)  mg/dL


 


Creatinine    (0.55-1.02)  mg/dL


 


Est Cr Clr Drug Dosing    mL/min


 


Estimated GFR (MDRD)    (>60)  mL/min


 


BUN/Creatinine Ratio    (14-18)  


 


Glucose    ()  mg/dL


 


POC Glucose   232 H  ()  mg/dL


 


Calcium    (8.5-10.1)  mg/dL


 


Phosphorus    (2.6-4.7)  mg/dL


 


Magnesium    (1.8-2.4)  mg/dl


 


Total Bilirubin    (0.2-1.0)  mg/dL


 


AST    (15-37)  U/L


 


ALT    (14-59)  U/L


 


Alkaline Phosphatase    ()  U/L


 


C-Reactive Protein    (<1.0)  mg/dL


 


Total Protein    (6.4-8.2)  g/dl


 


Albumin    (3.4-5.0)  g/dl


 


Globulin    gm/dL


 


Albumin/Globulin Ratio    (1-2)  


 


Procalcitonin    ng/mL


 


SARS-CoV-2 RNA (YASH)  Negative   (NEGATIVE)  











AMRIT Results - Last 24 hrs: 


                                  Microbiology











 04/06/21 13:10 Urine Culture - Preliminary





 Urine, Quick Cath (In-Out)    Escherichia Coli


 


 04/06/21 13:03 Aerobic Blood Culture - Preliminary





 Blood - Venous - Lab Draw    NO GROWTH AFTER 2 DAYS





 Anaerobic Blood Culture - Preliminary





    NO GROWTH AFTER 2 DAYS


 


 04/06/21 12:55 Aerobic Blood Culture - Preliminary





 Blood - Venous    NO GROWTH AFTER 2 DAYS





 Anaerobic Blood Culture - Preliminary





    NO GROWTH AFTER 2 DAYS











Med Orders - Current: 


                               Current Medications





Acetaminophen (Acetaminophen 325 Mg Tab)  650 mg PO Q4H PRN


   PRN Reason: Pain (Mild 1-3)/fever


Acetaminophen (Acetaminophen 650 Mg Supp)  650 mg RECTAL Q4H PRN


   PRN Reason: Pain (mild 1-3)


Albuterol (Albuterol 0.083% 2.5 Mg/3 Ml Neb Soln)  2.5 mg NEB Q2H PRN


   PRN Reason: Shortness Of Breath/wheezing


Albuterol/Ipratropium (Albuterol/Ipratropium 3.0-0.5 Mg/3 Ml Neb Soln)  3 ml NEB

Q4H PRN


   PRN Reason: Shortness Of Breath/wheezing


   Last Admin: 04/08/21 01:46 Dose:  3 ml


   Documented by: 


Heparin Sodium (Porcine) (Heparin Sodium 5,000 Units/Ml Vial)  5,000 units 

SUBCUT Q12H [facility]


   Last Admin: 04/09/21 06:30 Dose:  5,000 units


   Documented by: 


Ceftriaxone Sodium 2 gm/ (Sodium Chloride)  100 mls @ 200 mls/hr IV Q24H [facility]


   Last Admin: 04/09/21 12:26 Dose:  200 mls/hr


   Documented by: 


Insulin Glargine (Insulin Glarg,Human.Rec.Analog 100 Unit/Ml)  10 unit SUBCUT 

DAILY [facility]


   Last Admin: 04/09/21 08:26 Dose:  10 units


   Documented by: 


Insulin Human Lispro (Insulin Lispro 100 Unit/Ml)  0 unit SUBCUT QIDACANDBED [facility]

; Protocol


   Last Admin: 04/09/21 12:26 Dose:  2 unit


   Documented by: 


Pantoprazole Sodium (Pantoprazole 40 Mg Vial)  40 mg IVPUSH DAILY [facility]


   Last Admin: 04/09/21 08:25 Dose:  40 mg


   Documented by: 


Sodium Chloride (Sodium Chloride 0.9% 10 Ml Syringe)  10 ml FLUSH ASDIRECTED PRN


   PRN Reason: Keep Vein Open


   Last Admin: 04/06/21 13:11 Dose:  10 ml


   Documented by: 





Discontinued Medications





Acetaminophen (Acetaminophen 650 Mg Supp)  650 mg RECTAL NOW ONE


   Stop: 04/06/21 14:30


   Last Admin: 04/06/21 15:05 Dose:  650 mg


   Documented by: 


Furosemide (Furosemide 20 Mg/2 Ml Vial)  20 mg IVPUSH ONETIME ONE


   Stop: 04/07/21 00:04


   Last Admin: 04/07/21 00:27 Dose:  20 mg


   Documented by: 


Furosemide (Furosemide 40 Mg/4 Ml Vial)  40 mg IVPUSH NOW ONE


   Stop: 04/07/21 08:31


   Last Admin: 04/07/21 08:42 Dose:  40 mg


   Documented by: 


Furosemide (Furosemide 20 Mg/2 Ml Vial)  20 mg IVPUSH ONETIME ONE


   Stop: 04/07/21 22:58


   Last Admin: 04/07/21 23:06 Dose:  20 mg


   Documented by: 


Ceftriaxone Sodium 2 gm/ (Sodium Chloride)  100 mls @ 200 mls/hr IV ONETIME ONE


   Stop: 04/06/21 13:34


   Last Admin: 04/06/21 13:11 Dose:  200 mls/hr


   Documented by: 


Sodium Chloride (Normal Saline)  1,000 mls @ 125 mls/hr IV ASDIRECTED [facility]


   Last Infusion: 04/06/21 14:00 Dose:  999 mls/hr


   Documented by: 


Sodium Chloride (Normal Saline)  1,000 mls @ 1,000 mls/hr IV ONETIME ONE


   Stop: 04/06/21 14:55


   Last Admin: 04/06/21 15:05 Dose:  1,000 mls/hr


   Documented by: 


Sodium Chloride (Normal Saline)  1,000 mls @ 125 mls/hr IV ASDIRECTED [facility]


   Last Admin: 04/06/21 20:31 Dose:  150 mls/hr


   Documented by: 


Potassium Chloride 10 meq/ (Premix)  100 mls @ 100 mls/hr IV Q1H [facility]


   Stop: 04/07/21 04:14


   Last Admin: 04/07/21 03:35 Dose:  100 mls/hr


   Documented by: 


Sodium Chloride (Sodium Chloride 0.45%)  1,000 mls @ 125 mls/hr IV ASDIRECTED 

[facility]


   Last Infusion: 04/07/21 06:00 Dose:  75 mls/hr


   Documented by: 


Potassium Chloride 20 meq/ (Dextrose/Water)  1,010 mls @ 75.75 mls/hr IV Q13H 

[facility]


   Last Admin: 04/08/21 00:27 Dose:  75.75 mls/hr


   Documented by: 


Potassium Chloride 10 meq/ (Premix)  100 mls @ 100 mls/hr IV Q1H [facility]


   Stop: 04/08/21 11:44


   Last Admin: 04/08/21 11:30 Dose:  100 mls/hr


   Documented by: 


Sodium Chloride (Normal Saline)  1,000 mls @ 100 mls/hr IV ASDIRECTED [facility]


   Last Admin: 04/09/21 04:00 Dose:  100 mls/hr


   Documented by: 


Magnesium Sulfate (Magnesium Sulfate In Water 2 Gm/50 Ml)  2 gm in 50 mls @ 25 

mls/hr IV ONETIME ONE


   Stop: 04/09/21 10:59


   Last Admin: 04/09/21 08:35 Dose:  25 mls/hr


   Documented by: 


Insulin Glargine (Insulin Glarg,Human.Rec.Analog 100 Unit/Ml)  7 unit SUBCUT 

DAILY [facility]


   Last Admin: 04/07/21 16:16 Dose:  7 units


   Documented by: 


Insulin Human Lispro (Insulin Lispro 100 Unit/Ml)  8 unit SUBCUT ONETIME ONE


   Stop: 04/06/21 17:16


   Last Admin: 04/06/21 18:05 Dose:  8 units


   Documented by: 


Insulin Human Lispro (Insulin Lispro 100 Units/Ml 3 Ml Vial)  0 unit SUBCUT 

QIDACANDBED [facility]; Protocol


Insulin Human Lispro (Insulin Lispro 100 Unit/Ml)  0 unit SUBCUT QIDACANDBED 

[facility]; Protocol


   Last Admin: 04/06/21 23:10 Dose:  5 units


   Documented by: 


Insulin Human Lispro (Insulin Lispro 100 Unit/Ml)  10 unit SUBCUT ONETIME ONE


   Stop: 04/06/21 20:21


   Last Admin: 04/06/21 20:28 Dose:  10 units


   Documented by: 


Insulin Human Lispro (Insulin Lispro 100 Unit/Ml)  2 unit SUBCUT ONETIME ONE; 

Protocol


   Stop: 04/07/21 02:11


   Last Admin: 04/07/21 02:29 Dose:  2 units


   Documented by: 


Pantoprazole Sodium (Pantoprazole 40 Mg Tab.Cr)  40 mg PO DAILY@0700 [facility]


   Last Admin: 04/07/21 06:13 Dose:  Not Given


   Documented by: 











- Exam


Quality Assessment: Denies: Supplemental Oxygen


HEENT: Reports: Pupils Equal


Neck: Reports: Supple


Lungs: Reports: Clear to Auscultation, Normal Respiratory Effort


Cardiovascular: Reports: Regular Rate, Regular Rhythm


GI/Abdominal Exam: Normal Bowel Sounds, Soft, Non-Tender, No Distention


Neurological: Reports: No New Focal Deficit

## 2021-05-07 ENCOUNTER — HOSPITAL ENCOUNTER (INPATIENT)
Dept: HOSPITAL 41 - JD.ED | Age: 86
LOS: 3 days | Discharge: NURSING, REHAB OR LONG-TERM CARE | DRG: 871 | End: 2021-05-10
Attending: INTERNAL MEDICINE | Admitting: INTERNAL MEDICINE
Payer: MEDICARE

## 2021-05-07 DIAGNOSIS — Z79.899: ICD-10-CM

## 2021-05-07 DIAGNOSIS — Z90.12: ICD-10-CM

## 2021-05-07 DIAGNOSIS — J96.01: ICD-10-CM

## 2021-05-07 DIAGNOSIS — E55.9: ICD-10-CM

## 2021-05-07 DIAGNOSIS — E87.0: ICD-10-CM

## 2021-05-07 DIAGNOSIS — F32.9: ICD-10-CM

## 2021-05-07 DIAGNOSIS — R79.1: ICD-10-CM

## 2021-05-07 DIAGNOSIS — I12.9: ICD-10-CM

## 2021-05-07 DIAGNOSIS — N12: Primary | ICD-10-CM

## 2021-05-07 DIAGNOSIS — Z88.8: ICD-10-CM

## 2021-05-07 DIAGNOSIS — N17.9: ICD-10-CM

## 2021-05-07 DIAGNOSIS — K21.9: ICD-10-CM

## 2021-05-07 DIAGNOSIS — J69.0: ICD-10-CM

## 2021-05-07 DIAGNOSIS — F41.9: ICD-10-CM

## 2021-05-07 DIAGNOSIS — H35.30: ICD-10-CM

## 2021-05-07 DIAGNOSIS — Z88.5: ICD-10-CM

## 2021-05-07 DIAGNOSIS — R74.8: ICD-10-CM

## 2021-05-07 DIAGNOSIS — Z20.822: ICD-10-CM

## 2021-05-07 DIAGNOSIS — E86.0: ICD-10-CM

## 2021-05-07 DIAGNOSIS — Z79.84: ICD-10-CM

## 2021-05-07 DIAGNOSIS — N18.9: ICD-10-CM

## 2021-05-07 DIAGNOSIS — R73.9: ICD-10-CM

## 2021-05-07 DIAGNOSIS — F03.90: ICD-10-CM

## 2021-05-07 DIAGNOSIS — A41.9: ICD-10-CM

## 2021-05-07 PROCEDURE — 83735 ASSAY OF MAGNESIUM: CPT

## 2021-05-07 PROCEDURE — 83880 ASSAY OF NATRIURETIC PEPTIDE: CPT

## 2021-05-07 PROCEDURE — 87040 BLOOD CULTURE FOR BACTERIA: CPT

## 2021-05-07 PROCEDURE — 96366 THER/PROPH/DIAG IV INF ADDON: CPT

## 2021-05-07 PROCEDURE — 36600 WITHDRAWAL OF ARTERIAL BLOOD: CPT

## 2021-05-07 PROCEDURE — 84443 ASSAY THYROID STIM HORMONE: CPT

## 2021-05-07 PROCEDURE — 99285 EMERGENCY DEPT VISIT HI MDM: CPT

## 2021-05-07 PROCEDURE — 85027 COMPLETE CBC AUTOMATED: CPT

## 2021-05-07 PROCEDURE — 93005 ELECTROCARDIOGRAM TRACING: CPT

## 2021-05-07 PROCEDURE — 85730 THROMBOPLASTIN TIME PARTIAL: CPT

## 2021-05-07 PROCEDURE — 82009 KETONE BODYS QUAL: CPT

## 2021-05-07 PROCEDURE — U0002 COVID-19 LAB TEST NON-CDC: HCPCS

## 2021-05-07 PROCEDURE — 84484 ASSAY OF TROPONIN QUANT: CPT

## 2021-05-07 PROCEDURE — 36415 COLL VENOUS BLD VENIPUNCTURE: CPT

## 2021-05-07 PROCEDURE — 71275 CT ANGIOGRAPHY CHEST: CPT

## 2021-05-07 PROCEDURE — 85610 PROTHROMBIN TIME: CPT

## 2021-05-07 PROCEDURE — 80053 COMPREHEN METABOLIC PANEL: CPT

## 2021-05-07 PROCEDURE — 85007 BL SMEAR W/DIFF WBC COUNT: CPT

## 2021-05-07 PROCEDURE — 83605 ASSAY OF LACTIC ACID: CPT

## 2021-05-07 PROCEDURE — 71045 X-RAY EXAM CHEST 1 VIEW: CPT

## 2021-05-07 PROCEDURE — 85379 FIBRIN DEGRADATION QUANT: CPT

## 2021-05-07 PROCEDURE — 82803 BLOOD GASES ANY COMBINATION: CPT

## 2021-05-07 PROCEDURE — 81001 URINALYSIS AUTO W/SCOPE: CPT

## 2021-05-07 PROCEDURE — 70450 CT HEAD/BRAIN W/O DYE: CPT

## 2021-05-07 PROCEDURE — 96365 THER/PROPH/DIAG IV INF INIT: CPT

## 2021-05-07 RX ADMIN — HEPARIN SODIUM SCH UNITS: 5000 INJECTION, SOLUTION INTRAVENOUS; SUBCUTANEOUS at 10:04

## 2021-05-07 RX ADMIN — HEPARIN SODIUM SCH UNITS: 5000 INJECTION, SOLUTION INTRAVENOUS; SUBCUTANEOUS at 21:08

## 2021-05-07 NOTE — CT
CT chest

 

Technique: Multiple axial sections were obtained from above the lung 

apices inferiorly through the lung bases.  Intravenous contrast was 

utilized.  Study has been performed as a pulmonary angiogram protocol.

 

Comparison: Prior chest x-ray performed earlier on the same day (2:26 

AM).  Older chest CT study of 09/30/10 is also available.

 

Findings: Elevated right hemidiaphragm is seen.  This is a chronic 

finding.

 

Pulmonary arteries are well opacified.  No filling defects are seen to

 indicate pulmonary embolism.

 

Atherosclerotic calcification is seen within the thoracic aorta.  No 

aneurysm is seen.  Mediastinum and hilar regions show no adenopathy.  

No pericardial thickening is appreciated.  Mild atherosclerotic change

 is seen within the coronary arteries.

 

Small portion of the visualized upper abdominal structures shows a 

cyst within the left kidney measuring 7.2 cm.  Smaller cyst  is  noted

 within the right kidney measuring 1.7 cm.  Low-density lesion is 

noted within the left lobe of the liver measuring 3.9 cm in size most 

likely representing an additional cyst.

 

Lung window settings were reviewed.  Patchy areas of increased density

 within the right upper and right lower lung are seen.  Left lower 

lung also shows slight increased density.  Left lung is otherwise 

clear.

 

Bone window settings were reviewed which show osteopenia.  No acute 

osseous abnormality is appreciated.

 

Impression:

1.  No findings of pulmonary embolism.

2.  Patchy areas of increased density within the right upper and right

 lower lung as well as minimally within the left lower lung.  Please 

correlate if patient has any symptoms of infection for this to 

represent viral or bacterial pneumonia.

3.  Other nonacute findings as noted above.

 

Diagnostic code #3

 

I agree with preliminary report from [facility], finalized on 05/07/21, 4:50

 AM CDT, code 1

## 2021-05-07 NOTE — EDM.PDOC
ED HPI GENERAL MEDICAL PROBLEM





- General


Chief Complaint: General


Stated Complaint: [facility] AMB


Time Seen by Provider: 05/07/21 02:05


Source of Information: Reports: EMS


History Limitations: Reports: Altered Mental Status





- History of Present Illness


INITIAL COMMENTS - FREE TEXT/NARRATIVE: 





Ms. Manzo is an 88-year-old woman with a past medical history significant for 

advanced dementia, who is now brought to the ED from [facility] by 

EMS with a report of 2 days of lethargy/unresponsiveness.  EMS reports that they

were told that she has not had anything to eat or drink for the past 2 days.  

The nursing home papers indicate that the patient had a BP of 156/125, however, 

no further information is provided.  EMS reports that her Accu-Chek was 241.





Here in the ED, the patient's initial BP was found to be depressed at 92/76, 

although subsequent BP a few minutes later was found to be 108/71.  She is 

tachypneic at 24 rpm, and afebrile.  Her initial SpO2 was 88% on room air, 93% 

on 2 L of oxygen per nasal cannula.  She is unresponsive; she winces to noxious 

stimuli, but does not respond to verbal stimuli.





Due to the patient's altered mental status and lack of additional information 

from the nursing home, her recent review of systems is not obtainable.  

PMHx/PSHx/SocHx per prior medical records.








The patient's PCP is Dr. Alexander Kline.











- Related Data


                                    Allergies











Allergy/AdvReac Type Severity Reaction Status Date / Time


 


meperidine [From Demerol] Allergy  Cannot Verified 05/07/21 01:45





   Remember  


 


hydrocodone AdvReac  Vomiting Verified 05/07/21 01:45


 


tramadol AdvReac  Dizziness Verified 05/07/21 01:45











Home Meds: 


                                    Home Meds





Docusate Sodium/Sennosides [Senna Plus] 1 tab PO BID 04/06/21 [History]


Lactobacillus Acidophilus/Fos [Acidophilus Probiotic Tablet] 1 tab PO BID 

04/06/21 [History]


Magnesium Hydroxide [Milk of Magnesia] 30 ml PO TID PRN 04/06/21 [History]


Memantine [Namenda Xr] 14 mg PO DAILY 04/06/21 [History]


Omeprazole 20 mg PO ACBREAKFAST 04/06/21 [History]


bisacodyL [Dulcolax] 10 mg RECTAL DAILY PRN 04/06/21 [History]


metFORMIN [Glucophage] 250 mg PO BID 04/06/21 [History]


polyethylene glycoL 3350 [MiraLAX] 17 gm PO DAILY 04/06/21 [History]


Ondansetron [Zofran ODT] 4 mg PO Q4H PRN 05/07/21 [History]











Past Medical History


HEENT History: Reports: Macular Degeneration


Cardiovascular History: Reports: Hypertension


Respiratory History: Reports: None


Gastrointestinal History: Reports: GERD


Other Gastrointestinal History: noninfective gastroenteritis, colitis, diarrhea,

 nausea


Genitourinary History: Reports: Chronic Renal Insuffiency


OB/GYN History: Reports: Pregnancy


Psychiatric History: Reports: Anxiety, Dementia, Depression


Other Psychiatric History: no behavioral disturbances.


Endocrine/Metabolic History: Reports: Vitamin D Deficiency


Hematologic History: Reports: Anemia





- Past Surgical History


GI Surgical History: Reports: Other (See Below)


Other GI Surgeries/Procedures: colitis


Female  Surgical History: Reports: Mastectomy


Other Female  Surgeries/Procedures: Left sided mastectomy





Social & Family History





- Family History


Family Medical History: No Pertinent Family History





- Tobacco Use


Tobacco Use Status *Q: Unknown Ever Used Tobacco





- Caffeine Use


Caffeine Use: Reports: None


Caffeine Use Comment: Unknown due to pt condition.





- Living Situation & Occupation


Living situation: Reports: Extended Care Facility





ED ROS GENERAL





- Review of Systems


Review Of Systems: Unable To Obtain


Reason Not Obtained: Altered mental status





- Physical Exam


Exam: See Below


Exam Limited By: Altered Mental Status


General Appearance: No Apparent Distress, Obtunded


Eye Exam: Bilateral Eye: EOMI (eyes wander when eyelids opened), Normal 

Inspection, PERRL


Ears: Normal External Exam


Nose: Normal Inspection


Throat/Mouth: Normal Lips, No Airway Compromise, Other (Dry oral mucosa, but is 

mouth breathing)


Head Exam: Atraumatic, Other (Deeply recessed eyes and bilateral temporal 

wasting)


Neck: Normal Inspection.  No: Lymphadenopathy (L), Lymphadenopathy (R)


Respiratory/Chest: No Respiratory Distress, Lungs Clear, Normal Breath Sounds, 

No Accessory Muscle Use


Cardiovascular: Normal Peripheral Pulses, Regular Rate, Rhythm, No Edema, No 

Gallop, No JVD, No Murmur, No Rub


GI/Abdominal: Normal Bowel Sounds, Soft, No Organomegaly, No Distention, No 

Abnormal Bruit, No Mass


Neuro Exam (Abbreviated): Other (Winces with noxious stimuli, but does not 

respond to verbal stimuli)


Back Exam: Normal Inspection, Full Range of Motion, NT


Extremities: Normal Inspection, No Pedal Edema, Normal Capillary Refill


Skin Exam: Warm, Dry, Intact, Normal Color, No Rash


  ** #1 Interpretation


EKG Date: 05/07/21


Time: 01:44


Rhythm: NSR


Rate (Beats/Min): 97


P-Wave: Present


QRS: LBBB


QT: Prolonged (QTc 502 ms)


Comparison: No Change (8/22/2014)





Course





- Vital Signs


Last Recorded V/S: 


                                Last Vital Signs











Temp  36.3 C   05/07/21 01:41


 


Pulse  98   05/07/21 01:45


 


Resp  24 H  05/07/21 01:45


 


BP  108/71   05/07/21 01:45


 


Pulse Ox  93 L  05/07/21 01:45














- Orders/Labs/Meds


Orders: 


                               Active Orders 24 hr











 Category Date Time Status


 


 EKG Documentation Completion [RC] STAT Care  05/07/21 01:48 Active


 


 Ang Chest [CT] Stat Exams  05/07/21 03:10 Taken


 


 Chest 1V Frontal [CR] Stat Exams  05/07/21 01:50 Taken


 


 Head wo Cont [CT] Stat Exams  05/07/21 01:50 Taken


 


 CULTURE BLOOD [BC] Stat Lab  05/07/21 02:35 Received


 


 CULTURE BLOOD [BC] Stat Lab  05/07/21 02:40 Received


 


 CULTURE URINE [RM] Stat Lab  05/07/21 03:13 Ordered


 


 Sodium Chloride 0.9% [Normal Saline] 45 ml Med  05/07/21 03:45 Active





 IV ASDIRECTED   


 


 Sodium Chloride 0.9% [Saline Flush] Med  05/07/21 03:39 Active





 10 ml FLUSH ONETIME PRN   


 


 Blood Culture x2 Reflex Set [OM.PC] Stat Oth  05/07/21 02:25 Ordered








                                Medication Orders





Sodium Chloride (Normal Saline)  45 mls @ 40 mls/hr IV ASDIRECTED ANA


   Last Admin: 05/07/21 03:41  Dose: 40 mls/hr


   Documented by: CRYS


Sodium Chloride (Sodium Chloride 0.9% 10 Ml Syringe)  10 ml FLUSH ONETIME PRN


   PRN Reason: Keep Vein Open


   Last Admin: 05/07/21 03:41  Dose: 10 ml


   Documented by: CRYS








Labs: 


                                Laboratory Tests











  05/07/21 05/07/21 05/07/21 Range/Units





  01:52 01:56 02:00 


 


WBC     (3.98-10.04)  K/mm3


 


RBC     (3.98-5.22)  M/mm3


 


Hgb     (11.2-15.7)  gm/dl


 


Hct     (34.1-44.9)  %


 


MCV     (79.4-94.8)  fl


 


MCH     (25.6-32.2)  pg


 


MCHC     (32.2-35.5)  g/dl


 


RDW Std Deviation     (36.4-46.3)  fL


 


Plt Count     (182-369)  K/mm3


 


MPV     (9.4-12.3)  fl


 


Neutrophils % (Manual)     (40-60)  %


 


Band Neutrophils %     (0-10)  %


 


Lymphocytes % (Manual)     (20-40)  %


 


Atypical Lymphs %     %


 


Monocytes % (Manual)     (2-10)  %


 


Eosinophils % (Manual)     (0.7-5.8)  %


 


Basophils % (Manual)     (0.1-1.2)  


 


Platelet Estimate     


 


Hypochromasia     


 


RBC Morph Comment     


 


PT     (9.7-12.0)  SECONDS


 


INR     


 


APTT     (21.7-31.4)  SECONDS


 


D-Dimer, Quantitative     (0.19-0.50)  mg/L


 


Puncture Site    Lt radial  


 


ABG pH    7.42  (7.35-7.45)  


 


ABG pCO2    33.7 L  (35.0-45.0)  mmHg


 


ABG pO2    74.0 L  (80.0-100.0)  mmHg


 


ABG HCO3    21.4 L  (22.0-26.0)  meq/L


 


ABG O2 Saturation    94.5 L  (96.0-97.0)  %


 


ABG Base Excess    -1.9  (-2-2.0)  


 


Sb Test    Positive  


 


A-a Gradient    84  mmHg


 


O2 Delivery Device    Nasal cannula  


 


Oxygen Flow Rate    2.0  


 


FiO2    28.00  (21..00)  %


 


Sodium     (136-145)  mEq/L


 


Potassium     (3.5-5.1)  mEq/L


 


Chloride     ()  mEq/L


 


Carbon Dioxide     (21-32)  mEq/L


 


Anion Gap     (5-15)  


 


BUN     (7-18)  mg/dL


 


Creatinine     (0.55-1.02)  mg/dL


 


Est Cr Clr Drug Dosing     


 


Estimated GFR (MDRD)     (>60)  mL/min


 


BUN/Creatinine Ratio     (14-18)  


 


Glucose     ()  mg/dL


 


Lactic Acid     (0.4-2.0)  mmol/L


 


Calcium     (8.5-10.1)  mg/dL


 


Magnesium     (1.8-2.4)  mg/dl


 


Total Bilirubin     (0.2-1.0)  mg/dL


 


AST     (15-37)  U/L


 


ALT     (14-59)  U/L


 


Alkaline Phosphatase     ()  U/L


 


Troponin I     (0.00-0.056)  ng/mL


 


NT-Pro-B Natriuret Pep     (0-450)  pg/mL


 


Total Protein     (6.4-8.2)  g/dl


 


Albumin     (3.4-5.0)  g/dl


 


Globulin     gm/dL


 


Albumin/Globulin Ratio     (1-2)  


 


TSH 3rd Generation     (0.358-3.74)  uIU/mL


 


Urine Color   Yellow   (Yellow)  


 


Urine Appearance   Cloudy H   (Clear)  


 


Urine pH   6.0   (5.0-8.0)  


 


Ur Specific Gravity   > or = 1.030   (1.005-1.030)  


 


Urine Protein   3+ H   (Negative)  


 


Urine Glucose (UA)   Negative   (Negative)  


 


Urine Ketones   Negative   (Negative)  


 


Urine Occult Blood   2+ H   (Negative)  


 


Urine Nitrite   Negative   (Negative)  


 


Urine Bilirubin   1+ H   (Negative)  


 


Urine Urobilinogen   1.0   (0.2-1.0)  


 


Ur Leukocyte Esterase   3+ H   (Negative)  


 


Urine RBC   0-5   (0-5)  /hpf


 


Urine WBC   Too numerous to cnt H   (0-5)  /hpf


 


Urine WBC Clumps   Few   (NOT SEEN)  /hpf


 


Ur Squamous Epith Cells   0-5   (0-5)  /hpf


 


Urine Bacteria   Moderate H   (FEW)  /hpf


 


Urine Mucus   Not seen   (FEW)  /hpf


 


Ketones     (0.0-0.3)  mM


 


SARS-CoV-2 RNA (YASH)  Negative    (NEGATIVE)  














  05/07/21 05/07/21 05/07/21 Range/Units





  02:12 02:12 02:12 


 


WBC  23.60 H    (3.98-10.04)  K/mm3


 


RBC  4.98    (3.98-5.22)  M/mm3


 


Hgb  14.4  D    (11.2-15.7)  gm/dl


 


Hct  48.5 H    (34.1-44.9)  %


 


MCV  97.4 H    (79.4-94.8)  fl


 


MCH  28.9    (25.6-32.2)  pg


 


MCHC  29.7 L    (32.2-35.5)  g/dl


 


RDW Std Deviation  54.0 H    (36.4-46.3)  fL


 


Plt Count  393 H D    (182-369)  K/mm3


 


MPV  11.5    (9.4-12.3)  fl


 


Neutrophils % (Manual)  82 H    (40-60)  %


 


Band Neutrophils %  0    (0-10)  %


 


Lymphocytes % (Manual)  14 L    (20-40)  %


 


Atypical Lymphs %  0    %


 


Monocytes % (Manual)  4    (2-10)  %


 


Eosinophils % (Manual)  0 L    (0.7-5.8)  %


 


Basophils % (Manual)  0 L    (0.1-1.2)  


 


Platelet Estimate  Adequate    


 


Hypochromasia  1+ slight    


 


RBC Morph Comment  Normal    


 


PT    12.0  (9.7-12.0)  SECONDS


 


INR    1.12  


 


APTT    24.7  (21.7-31.4)  SECONDS


 


D-Dimer, Quantitative    1.18 H  (0.19-0.50)  mg/L


 


Puncture Site     


 


ABG pH     (7.35-7.45)  


 


ABG pCO2     (35.0-45.0)  mmHg


 


ABG pO2     (80.0-100.0)  mmHg


 


ABG HCO3     (22.0-26.0)  meq/L


 


ABG O2 Saturation     (96.0-97.0)  %


 


ABG Base Excess     (-2-2.0)  


 


Sb Test     


 


A-a Gradient     mmHg


 


O2 Delivery Device     


 


Oxygen Flow Rate     


 


FiO2     (21..00)  %


 


Sodium   152 H D   (136-145)  mEq/L


 


Potassium   4.3   (3.5-5.1)  mEq/L


 


Chloride   114 H   ()  mEq/L


 


Carbon Dioxide   23   (21-32)  mEq/L


 


Anion Gap   19.3 H   (5-15)  


 


BUN   59 H D   (7-18)  mg/dL


 


Creatinine   1.8 H   (0.55-1.02)  mg/dL


 


Est Cr Clr Drug Dosing   TNP   


 


Estimated GFR (MDRD)   27   (>60)  mL/min


 


BUN/Creatinine Ratio   32.8 H   (14-18)  


 


Glucose   294 H   ()  mg/dL


 


Lactic Acid     (0.4-2.0)  mmol/L


 


Calcium   9.4  D   (8.5-10.1)  mg/dL


 


Magnesium   2.9 H   (1.8-2.4)  mg/dl


 


Total Bilirubin   0.8   (0.2-1.0)  mg/dL


 


AST   47 H   (15-37)  U/L


 


ALT   66 H   (14-59)  U/L


 


Alkaline Phosphatase   99   ()  U/L


 


Troponin I   < 0.017   (0.00-0.056)  ng/mL


 


NT-Pro-B Natriuret Pep     (0-450)  pg/mL


 


Total Protein   8.6 H   (6.4-8.2)  g/dl


 


Albumin   3.6   (3.4-5.0)  g/dl


 


Globulin   5.0   gm/dL


 


Albumin/Globulin Ratio   0.7 L   (1-2)  


 


TSH 3rd Generation   4.041 H   (0.358-3.74)  uIU/mL


 


Urine Color     (Yellow)  


 


Urine Appearance     (Clear)  


 


Urine pH     (5.0-8.0)  


 


Ur Specific Gravity     (1.005-1.030)  


 


Urine Protein     (Negative)  


 


Urine Glucose (UA)     (Negative)  


 


Urine Ketones     (Negative)  


 


Urine Occult Blood     (Negative)  


 


Urine Nitrite     (Negative)  


 


Urine Bilirubin     (Negative)  


 


Urine Urobilinogen     (0.2-1.0)  


 


Ur Leukocyte Esterase     (Negative)  


 


Urine RBC     (0-5)  /hpf


 


Urine WBC     (0-5)  /hpf


 


Urine WBC Clumps     (NOT SEEN)  /hpf


 


Ur Squamous Epith Cells     (0-5)  /hpf


 


Urine Bacteria     (FEW)  /hpf


 


Urine Mucus     (FEW)  /hpf


 


Ketones     (0.0-0.3)  mM


 


SARS-CoV-2 RNA (YASH)     (NEGATIVE)  














  05/07/21 05/07/21 05/07/21 Range/Units





  02:12 02:12 02:12 


 


WBC     (3.98-10.04)  K/mm3


 


RBC     (3.98-5.22)  M/mm3


 


Hgb     (11.2-15.7)  gm/dl


 


Hct     (34.1-44.9)  %


 


MCV     (79.4-94.8)  fl


 


MCH     (25.6-32.2)  pg


 


MCHC     (32.2-35.5)  g/dl


 


RDW Std Deviation     (36.4-46.3)  fL


 


Plt Count     (182-369)  K/mm3


 


MPV     (9.4-12.3)  fl


 


Neutrophils % (Manual)     (40-60)  %


 


Band Neutrophils %     (0-10)  %


 


Lymphocytes % (Manual)     (20-40)  %


 


Atypical Lymphs %     %


 


Monocytes % (Manual)     (2-10)  %


 


Eosinophils % (Manual)     (0.7-5.8)  %


 


Basophils % (Manual)     (0.1-1.2)  


 


Platelet Estimate     


 


Hypochromasia     


 


RBC Morph Comment     


 


PT     (9.7-12.0)  SECONDS


 


INR     


 


APTT     (21.7-31.4)  SECONDS


 


D-Dimer, Quantitative     (0.19-0.50)  mg/L


 


Puncture Site     


 


ABG pH     (7.35-7.45)  


 


ABG pCO2     (35.0-45.0)  mmHg


 


ABG pO2     (80.0-100.0)  mmHg


 


ABG HCO3     (22.0-26.0)  meq/L


 


ABG O2 Saturation     (96.0-97.0)  %


 


ABG Base Excess     (-2-2.0)  


 


Sb Test     


 


A-a Gradient     mmHg


 


O2 Delivery Device     


 


Oxygen Flow Rate     


 


FiO2     (21..00)  %


 


Sodium     (136-145)  mEq/L


 


Potassium     (3.5-5.1)  mEq/L


 


Chloride     ()  mEq/L


 


Carbon Dioxide     (21-32)  mEq/L


 


Anion Gap     (5-15)  


 


BUN     (7-18)  mg/dL


 


Creatinine     (0.55-1.02)  mg/dL


 


Est Cr Clr Drug Dosing     


 


Estimated GFR (MDRD)     (>60)  mL/min


 


BUN/Creatinine Ratio     (14-18)  


 


Glucose     ()  mg/dL


 


Lactic Acid   2.7 H*   (0.4-2.0)  mmol/L


 


Calcium     (8.5-10.1)  mg/dL


 


Magnesium     (1.8-2.4)  mg/dl


 


Total Bilirubin     (0.2-1.0)  mg/dL


 


AST     (15-37)  U/L


 


ALT     (14-59)  U/L


 


Alkaline Phosphatase     ()  U/L


 


Troponin I     (0.00-0.056)  ng/mL


 


NT-Pro-B Natriuret Pep  1474 H    (0-450)  pg/mL


 


Total Protein     (6.4-8.2)  g/dl


 


Albumin     (3.4-5.0)  g/dl


 


Globulin     gm/dL


 


Albumin/Globulin Ratio     (1-2)  


 


TSH 3rd Generation     (0.358-3.74)  uIU/mL


 


Urine Color     (Yellow)  


 


Urine Appearance     (Clear)  


 


Urine pH     (5.0-8.0)  


 


Ur Specific Gravity     (1.005-1.030)  


 


Urine Protein     (Negative)  


 


Urine Glucose (UA)     (Negative)  


 


Urine Ketones     (Negative)  


 


Urine Occult Blood     (Negative)  


 


Urine Nitrite     (Negative)  


 


Urine Bilirubin     (Negative)  


 


Urine Urobilinogen     (0.2-1.0)  


 


Ur Leukocyte Esterase     (Negative)  


 


Urine RBC     (0-5)  /hpf


 


Urine WBC     (0-5)  /hpf


 


Urine WBC Clumps     (NOT SEEN)  /hpf


 


Ur Squamous Epith Cells     (0-5)  /hpf


 


Urine Bacteria     (FEW)  /hpf


 


Urine Mucus     (FEW)  /hpf


 


Ketones    0.27  (0.0-0.3)  mM


 


SARS-CoV-2 RNA (YASH)     (NEGATIVE)  














  05/07/21 Range/Units





  05:07 


 


WBC   (3.98-10.04)  K/mm3


 


RBC   (3.98-5.22)  M/mm3


 


Hgb   (11.2-15.7)  gm/dl


 


Hct   (34.1-44.9)  %


 


MCV   (79.4-94.8)  fl


 


MCH   (25.6-32.2)  pg


 


MCHC   (32.2-35.5)  g/dl


 


RDW Std Deviation   (36.4-46.3)  fL


 


Plt Count   (182-369)  K/mm3


 


MPV   (9.4-12.3)  fl


 


Neutrophils % (Manual)   (40-60)  %


 


Band Neutrophils %   (0-10)  %


 


Lymphocytes % (Manual)   (20-40)  %


 


Atypical Lymphs %   %


 


Monocytes % (Manual)   (2-10)  %


 


Eosinophils % (Manual)   (0.7-5.8)  %


 


Basophils % (Manual)   (0.1-1.2)  


 


Platelet Estimate   


 


Hypochromasia   


 


RBC Morph Comment   


 


PT   (9.7-12.0)  SECONDS


 


INR   


 


APTT   (21.7-31.4)  SECONDS


 


D-Dimer, Quantitative   (0.19-0.50)  mg/L


 


Puncture Site   


 


ABG pH   (7.35-7.45)  


 


ABG pCO2   (35.0-45.0)  mmHg


 


ABG pO2   (80.0-100.0)  mmHg


 


ABG HCO3   (22.0-26.0)  meq/L


 


ABG O2 Saturation   (96.0-97.0)  %


 


ABG Base Excess   (-2-2.0)  


 


Sb Test   


 


A-a Gradient   mmHg


 


O2 Delivery Device   


 


Oxygen Flow Rate   


 


FiO2   (21..00)  %


 


Sodium   (136-145)  mEq/L


 


Potassium   (3.5-5.1)  mEq/L


 


Chloride   ()  mEq/L


 


Carbon Dioxide   (21-32)  mEq/L


 


Anion Gap   (5-15)  


 


BUN   (7-18)  mg/dL


 


Creatinine   (0.55-1.02)  mg/dL


 


Est Cr Clr Drug Dosing   


 


Estimated GFR (MDRD)   (>60)  mL/min


 


BUN/Creatinine Ratio   (14-18)  


 


Glucose   ()  mg/dL


 


Lactic Acid  2.8 H*  (0.4-2.0)  mmol/L


 


Calcium   (8.5-10.1)  mg/dL


 


Magnesium   (1.8-2.4)  mg/dl


 


Total Bilirubin   (0.2-1.0)  mg/dL


 


AST   (15-37)  U/L


 


ALT   (14-59)  U/L


 


Alkaline Phosphatase   ()  U/L


 


Troponin I   (0.00-0.056)  ng/mL


 


NT-Pro-B Natriuret Pep   (0-450)  pg/mL


 


Total Protein   (6.4-8.2)  g/dl


 


Albumin   (3.4-5.0)  g/dl


 


Globulin   gm/dL


 


Albumin/Globulin Ratio   (1-2)  


 


TSH 3rd Generation   (0.358-3.74)  uIU/mL


 


Urine Color   (Yellow)  


 


Urine Appearance   (Clear)  


 


Urine pH   (5.0-8.0)  


 


Ur Specific Gravity   (1.005-1.030)  


 


Urine Protein   (Negative)  


 


Urine Glucose (UA)   (Negative)  


 


Urine Ketones   (Negative)  


 


Urine Occult Blood   (Negative)  


 


Urine Nitrite   (Negative)  


 


Urine Bilirubin   (Negative)  


 


Urine Urobilinogen   (0.2-1.0)  


 


Ur Leukocyte Esterase   (Negative)  


 


Urine RBC   (0-5)  /hpf


 


Urine WBC   (0-5)  /hpf


 


Urine WBC Clumps   (NOT SEEN)  /hpf


 


Ur Squamous Epith Cells   (0-5)  /hpf


 


Urine Bacteria   (FEW)  /hpf


 


Urine Mucus   (FEW)  /hpf


 


Ketones   (0.0-0.3)  mM


 


SARS-CoV-2 RNA (YASH)   (NEGATIVE)  











Meds: 


Medications











Generic Name Dose Route Start Last Admin





  Trade Name Freq  PRN Reason Stop Dose Admin


 


Sodium Chloride  45 mls @ 40 mls/hr  05/07/21 03:45  05/07/21 03:41





  Normal Saline  IV   40 mls/hr





  ASDIRECTED ANA   Administration


 


Sodium Chloride  10 ml  05/07/21 03:39  05/07/21 03:41





  Sodium Chloride 0.9% 10 Ml Syringe  FLUSH   10 ml





  ONETIME PRN   Administration





  Keep Vein Open  














Discontinued Medications














Generic Name Dose Route Start Last Admin





  Trade Name Freq  PRN Reason Stop Dose Admin


 


Sodium Chloride  500 mls @ 1,000 mls/hr  05/07/21 02:36  05/07/21 02:40





  Normal Saline  IV  05/07/21 03:05  1,000 mls/hr





  .BOLUS ONE   Administration


 


Sodium Chloride  1,000 mls @ 999 mls/hr  05/07/21 03:09  05/07/21 03:56





  Normal Saline  IV  05/07/21 04:09  999 mls/hr





  ONETIME ONE   Administration


 


Levofloxacin/Dextrose 750 mg/  150 mls @ 100 mls/hr  05/07/21 03:20  05/07/21 

03:47





  Premix  IV  05/07/21 04:49  100 mls/hr





  ONETIME STA   Administration


 


Sodium Chloride  1,000 mls @ 500 mls/hr  05/07/21 04:28  05/07/21 05:31





  Normal Saline  IV  05/07/21 06:27  500 mls/hr





  ONETIME ONE   Administration


 


Norepinephrine Bitartrate 4 mg  250 mls @ 7.5 mls/hr  05/07/21 04:45 





  / Dextrose/Water  IV  





  TITRATE ANA  





  Protocol  





  2 MCG/MIN  


 


Iopamidol  100 ml  05/07/21 03:39  05/07/21 03:41





  Iopamidol 755 Mg/Ml 100 Ml Bottle  IVPUSH  05/07/21 03:40  100 ml





  ONETIME ONE   Administration














- Re-Assessments/Exams


Free Text/Narrative Re-Assessment/Exam: 





05/07/21 02:24


As above, the patient has reportedly been lethargic/unresponsive for the past 2 

days.  Paperwork from [facility] indicates a BP of 156/125, how

ever, she was found to be somewhat hypotensive here in the ED.  An ECG, obtained

 at triage, demonstrates a normal sinus rhythm at 97 bpm and a LBBB, also 

present on a prior ECG from 2014.  On physical exam, the patient has deeply 

sunken eyes and bitemporal wasting.  She winces with noxious stimuli, but does 

not respond to verbal stimuli.  Her physical exam is otherwise grossly 

unremarkable.  I have ordered a work-up that includes several blood tests, 2 

sets of blood cultures, an ABG, a urinalysis by quick catheter, a swab for the 

SARS-CoV-2 virus, a portable chest x-ray, and a CT of the head without contrast.

  In the meantime, the patient will be given a 500 mL bolus of IV fluid.  I want

 to be judicious with her IV fluid, as she has a history of CHF.








05/07/21 02:44


CT of the head without contrast is read by [facility] as "Advanced atrophy and 

nonspecific chronic white matter changes.  No acute intracranial abnormality 

identified."








05/07/21 03:00


Portable chest radiograph reviewed.  The cardiac silhouette is within normal 

limits.  No pulmonary vascular congestion.  No pleural effusions seen on this AP

 view.  No focal infiltrate.  No pneumothorax.  The right hemidiaphragm is 

elevated, also seen on prior portable chest x-ray dated 4/8/2021.  Formal read 

per the Radiologist pending.





The patient's CMP is remarkable for hypernatremia of 152.  Her anion gap is 

slightly elevated at 19.3, but with a bicarbonate normal at 23.  Her BUN/Cr are 

elevated at 59/1.8, she has hyperglycemia of 294.


Her magnesium level is slightly elevated at 2.9.


Her lactic acid level is modestly elevated at 2.7.


Her TSH is mildly elevated at 4.041.


Her troponin is undetectably low.


Her D-dimer is elevated at 1.18.


Her coags are within normal limits.


Her ABG demonstrates a chronic respiratory alkalosis with appropriately 

compensated metabolic acidosis.


Her swab for the SARS-CoV-2 virus returned negative.





Results of the patient's CBC, pro-BNP, serum ketones, and urinalysis are still 

pending.





Review of prior labs finds that the patient's BUN/Cr was 22/1.2 on 4/9/2021.





Based on the above, the patient appears to be dehydrated with acute renal 

failure.  I will order a fluid bolus, along with a CT angiogram of the chest to 

evaluate for a PE.  Her elevated lactic acid level appears to represent 

hyperlactemia, not lactic acidosis.  Nevertheless, a repeat lactic acid level 

has been ordered.








05/07/21 03:16


The patient's urinalysis is remarkable for 2+ occult blood with 0-5 RBCs, 3+ 

leukocyte esterase with too numerous to count WBCs, nitrate negative with 

moderate bacteria, and 0-5 squamous epithelial cells.





Based on the above, I have ordered a urine culture, and will start the patient 

on IV Levaquin for the treatment of presumed pyelonephritis as the cause of her 

altered mental status.








05/07/21 03:21


The patient's CBC is remarkable for leukocytosis of 23.60, but with 0% bandemia.

  Her Hct is mildly elevated at 48.5, but with a Hgb normal at 14.4.  She has 

thrombocytosis of 393,000.








05/07/21 03:50


The patient's serum ketones are within normal limits at 0.27.


Her pro-BNP is elevated at 1474.





Review of prior labs finds that the patient's pro-BNP was 8711 on 4/6/2021.








05/07/21 04:10


CT angiogram of the chest is read by [facility] as:


1.  No pulmonary embolism present.


2.  Cardiomegaly and coronary atherosclerosis.


3.  Moderate grade atherosclerotic change involving the aorta without aneurysm 

or dissection identified.


4.  Patchy ground-glass opacity with areas of consolidation in the lower lobes. 

 Finding is nonspecific.  Correlate for possible atypical/viral pneumonia.





Based on the above, if the patient has pneumonia, she is already being treated 

for with IV Levaquin.








05/07/21 04:30


The patient has received 1.5 L of NS.  I have ordered an additional liter, to be

 given at 500 mL/h.  Obviously, the patient is going to need to be admitted, 

however, I will let the Hospitalist sleep until 06:30, at which time I will call

 him for placement.  We will keep the patient here in the ED in the meantime.








05/07/21 04:33


Notified by Nicol PENDLETON that despite aggressive treatment, the patient's most 

recent BP is 72/53.  She appears to be suffering from sepsis.  I will start her 

on low-dose norepinephrine.








05/07/21 05:23


Without treatment, the patient's BP is up to 102/66, with a MAP of 77, therefore

 the norepinephrine has not been started.








05/07/21 06:36


The patient's BP has remained high enough to not require pressors.  Her repeat 

lactic acid stayed essentially unchanged at 2.8.  Again, while she does not have

 lactic acidosis, she may have sepsis, and either way she is being treated for s

uch with IV fluid and antibiotics.





Case discussed with Dr. Toscano, Hospitalist, at 06:33.  He accepted the patient 

for admission to the hospital.














Departure





- Departure


Time of Disposition: 06:37


Disposition: Admitted As Inpatient 66


Condition: Fair


Clinical Impression: 


 Dehydration, Hyperglycemia, Acute renal insufficiency, Pyelonephritis








- Discharge Information


*PRESCRIPTION DRUG MONITORING PROGRAM REVIEWED*: Not Applicable


*COPY OF PRESCRIPTION DRUG MONITORING REPORT IN PATIENT MICHAEL: Not Applicable


Referrals: 


Alexander Kline MD [Primary Care Provider] - 


Forms:  ED Department Discharge





Sepsis Event Note (ED)





- Evaluation


Sepsis Screening Result: No Definite Risk





- Focused Exam


Vital Signs: 


                                   Vital Signs











  Temp Pulse Resp BP Pulse Ox


 


 05/07/21 01:45   98  24 H  108/71  93 L


 


 05/07/21 01:41  36.3 C  100  24 H  92/76  88 L














- My Orders


Last 24 Hours: 


My Active Orders





05/07/21 01:48


EKG Documentation Completion [RC] STAT 





05/07/21 01:50


Chest 1V Frontal [CR] Stat 


Head wo Cont [CT] Stat 





05/07/21 02:25


Blood Culture x2 Reflex Set [OM.PC] Stat 





05/07/21 02:35


CULTURE BLOOD [BC] Stat 





05/07/21 02:40


CULTURE BLOOD [BC] Stat 





05/07/21 03:10


Ang Chest [CT] Stat 





05/07/21 03:13


CULTURE URINE [RM] Stat 





05/07/21 03:39


Sodium Chloride 0.9% [Saline Flush]   10 ml FLUSH ONETIME PRN 





05/07/21 03:45


Sodium Chloride 0.9% [Normal Saline] 45 ml IV ASDIRECTED 














- Assessment/Plan


Last 24 Hours: 


My Active Orders





05/07/21 01:48


EKG Documentation Completion [RC] STAT 





05/07/21 01:50


Chest 1V Frontal [CR] Stat 


Head wo Cont [CT] Stat 





05/07/21 02:25


Blood Culture x2 Reflex Set [OM.PC] Stat 





05/07/21 02:35


CULTURE BLOOD [BC] Stat 





05/07/21 02:40


CULTURE BLOOD [BC] Stat 





05/07/21 03:10


Ang Chest [CT] Stat 





05/07/21 03:13


CULTURE URINE [RM] Stat 





05/07/21 03:39


Sodium Chloride 0.9% [Saline Flush]   10 ml FLUSH ONETIME PRN 





05/07/21 03:45


Sodium Chloride 0.9% [Normal Saline] 45 ml IV ASDIRECTED

## 2021-05-07 NOTE — PCM.HP.2
H&P History of Present Illness





- General


Date of Service: 05/07/21


Admit Problem/Dx: 


                           Admission Diagnosis/Problem





Admission Diagnosis/Problem      Pyelonephritis











- History of Present Illness


Initial Comments - Free Text/Narative: 





Patient is a 88-year-old female with advanced dementia who resides in a nursing 

home presented to the emergency department after having a bout of hypotension at

the nursing facility.  At time presentation to the emergency department patient 

was found to be hypotensive however this did respond to fluid resuscitation and 

vasopressors did not need to be initiated.  During the work-up in the emergency 

department patient found to have suspected urinary tract infection as well as a 

right-sided pneumonia on CTA of the chest.  Patient was found to be 

hypernatremic with evidence of acute kidney injury as well.  Patient was on IV 

fluids as well as given empiric Levaquin for coverage of pneumonia and urinary 

tract infection.





It appears in review of patient's chart as she has advanced dementia and is 

unable to contribute to HPI review of systems that on 4/6/2021 she was found to 

have a complicated urinary tract infection with culture positive for E. coli as 

well as Aerococcus.  Sensitivities do show that they were sensitive to Rocephin.

 Previous culture results in 2017 showed E. coli resistant to fluoroquinolones.





- Related Data


Allergies/Adverse Reactions: 


                                    Allergies











Allergy/AdvReac Type Severity Reaction Status Date / Time


 


meperidine [From Demerol] Allergy  Cannot Verified 05/07/21 01:45





   Remember  


 


hydrocodone AdvReac  Vomiting Verified 05/07/21 01:45


 


tramadol AdvReac  Dizziness Verified 05/07/21 01:45











Home Medications: 


                                    Home Meds





Docusate Sodium/Sennosides [Senna Plus] 1 tab PO BID 04/06/21 [History]


Lactobacillus Acidophilus/Fos [Acidophilus Probiotic Tablet] 1 tab PO BID 

04/06/21 [History]


Magnesium Hydroxide [Milk of Magnesia] 30 ml PO TID PRN 04/06/21 [History]


Memantine [Namenda Xr] 14 mg PO DAILY 04/06/21 [History]


Omeprazole 20 mg PO ACBREAKFAST 04/06/21 [History]


bisacodyL [Dulcolax] 10 mg RECTAL DAILY PRN 04/06/21 [History]


metFORMIN [Glucophage] 250 mg PO BID 04/06/21 [History]


polyethylene glycoL 3350 [MiraLAX] 17 gm PO DAILY 04/06/21 [History]


Ondansetron [Zofran ODT] 4 mg PO Q4H PRN 05/07/21 [History]











Past Medical History


HEENT History: Reports: Macular Degeneration


Cardiovascular History: Reports: Hypertension


Respiratory History: Reports: None


Gastrointestinal History: Reports: GERD


Other Gastrointestinal History: noninfective gastroenteritis, colitis, diarrhea,

 nausea


Genitourinary History: Reports: Chronic Renal Insuffiency


OB/GYN History: Reports: Pregnancy


Psychiatric History: Reports: Anxiety, Dementia, Depression


Other Psychiatric History: no behavioral disturbances.


Endocrine/Metabolic History: Reports: Vitamin D Deficiency


Hematologic History: Reports: Anemia





- Past Surgical History


GI Surgical History: Reports: Other (See Below)


Other GI Surgeries/Procedures: colitis


Female  Surgical History: Reports: Mastectomy


Other Female  Surgeries/Procedures: Left sided mastectomy





Social & Family History





- Family History


Family Medical History: No Pertinent Family History





- Tobacco Use


Tobacco Use Status *Q: Unknown Ever Used Tobacco





- Caffeine Use


Caffeine Use: Reports: None


Caffeine Use Comment: Unknown due to pt condition.





- Living Situation & Occupation


Living situation: Reports: Extended Care Facility





H&P Review of Systems





- Review of Systems:


Review Of Systems: Unable To Obtain


Reason Not Obtained: baseline dementia 





Exam





- Exam


Exam: See Below





- Vital Signs


Vital Signs: 


                                Last Vital Signs











Temp  97.3 F   05/07/21 01:41


 


Pulse  71   05/07/21 07:40


 


Resp  20   05/07/21 07:40


 


BP  123/76   05/07/21 07:40


 


Pulse Ox  95   05/07/21 07:40














- Exam


General: Lethargic, Other (No acute distress ).  No: Oriented


HEENT: Conjunctiva Clear, Pupils Equal, Other (dry mucus membranes )


Neck: Supple, Trachea Midline


Lungs: Normal Respiratory Effort, Decreased Breath Sounds (RLL, ).  No: 

Crackles, Stridor, Wheezing


Cardiovascular: Regular Rate, Regular Rhythm, Systolic Murmur


GI/Abdominal Exam: Normal Bowel Sounds, Soft, Non-Tender, No Organomegaly, No 

Distention


Extremities: Normal Inspection, Non-Tender, No Pedal Edema


Peripheral Pulses: 2+: Radial (L), Radial (R), Dorsalis Pedis (L), Dorsalis 

Pedis (R)


Skin: Warm, Dry, Intact


Neuro Extensive - Mental Status: Other (Advanced dementia, baseline A/O x 1).  

No: Oriented x3, Memory Intact





- Patient Data


Lab Results Last 24 hrs: 


                         Laboratory Results - last 24 hr











  05/07/21 05/07/21 05/07/21 Range/Units





  01:52 01:56 02:00 


 


WBC     (3.98-10.04)  K/mm3


 


RBC     (3.98-5.22)  M/mm3


 


Hgb     (11.2-15.7)  gm/dl


 


Hct     (34.1-44.9)  %


 


MCV     (79.4-94.8)  fl


 


MCH     (25.6-32.2)  pg


 


MCHC     (32.2-35.5)  g/dl


 


RDW Std Deviation     (36.4-46.3)  fL


 


Plt Count     (182-369)  K/mm3


 


MPV     (9.4-12.3)  fl


 


Neutrophils % (Manual)     (40-60)  %


 


Band Neutrophils %     (0-10)  %


 


Lymphocytes % (Manual)     (20-40)  %


 


Atypical Lymphs %     %


 


Monocytes % (Manual)     (2-10)  %


 


Eosinophils % (Manual)     (0.7-5.8)  %


 


Basophils % (Manual)     (0.1-1.2)  


 


Platelet Estimate     


 


Hypochromasia     


 


RBC Morph Comment     


 


PT     (9.7-12.0)  SECONDS


 


INR     


 


APTT     (21.7-31.4)  SECONDS


 


D-Dimer, Quantitative     (0.19-0.50)  mg/L


 


Puncture Site    Lt radial  


 


ABG pH    7.42  (7.35-7.45)  


 


ABG pCO2    33.7 L  (35.0-45.0)  mmHg


 


ABG pO2    74.0 L  (80.0-100.0)  mmHg


 


ABG HCO3    21.4 L  (22.0-26.0)  meq/L


 


ABG O2 Saturation    94.5 L  (96.0-97.0)  %


 


ABG Base Excess    -1.9  (-2-2.0)  


 


Sb Test    Positive  


 


A-a Gradient    84  mmHg


 


O2 Delivery Device    Nasal cannula  


 


Oxygen Flow Rate    2.0  


 


FiO2    28.00  (21..00)  %


 


Sodium     (136-145)  mEq/L


 


Potassium     (3.5-5.1)  mEq/L


 


Chloride     ()  mEq/L


 


Carbon Dioxide     (21-32)  mEq/L


 


Anion Gap     (5-15)  


 


BUN     (7-18)  mg/dL


 


Creatinine     (0.55-1.02)  mg/dL


 


Est Cr Clr Drug Dosing     


 


Estimated GFR (MDRD)     (>60)  mL/min


 


BUN/Creatinine Ratio     (14-18)  


 


Glucose     ()  mg/dL


 


Lactic Acid     (0.4-2.0)  mmol/L


 


Calcium     (8.5-10.1)  mg/dL


 


Magnesium     (1.8-2.4)  mg/dl


 


Total Bilirubin     (0.2-1.0)  mg/dL


 


AST     (15-37)  U/L


 


ALT     (14-59)  U/L


 


Alkaline Phosphatase     ()  U/L


 


Troponin I     (0.00-0.056)  ng/mL


 


NT-Pro-B Natriuret Pep     (0-450)  pg/mL


 


Total Protein     (6.4-8.2)  g/dl


 


Albumin     (3.4-5.0)  g/dl


 


Globulin     gm/dL


 


Albumin/Globulin Ratio     (1-2)  


 


TSH 3rd Generation     (0.358-3.74)  uIU/mL


 


Urine Color   Yellow   (Yellow)  


 


Urine Appearance   Cloudy H   (Clear)  


 


Urine pH   6.0   (5.0-8.0)  


 


Ur Specific Gravity   > or = 1.030   (1.005-1.030)  


 


Urine Protein   3+ H   (Negative)  


 


Urine Glucose (UA)   Negative   (Negative)  


 


Urine Ketones   Negative   (Negative)  


 


Urine Occult Blood   2+ H   (Negative)  


 


Urine Nitrite   Negative   (Negative)  


 


Urine Bilirubin   1+ H   (Negative)  


 


Urine Urobilinogen   1.0   (0.2-1.0)  


 


Ur Leukocyte Esterase   3+ H   (Negative)  


 


Urine RBC   0-5   (0-5)  /hpf


 


Urine WBC   Too numerous to cnt H   (0-5)  /hpf


 


Urine WBC Clumps   Few   (NOT SEEN)  /hpf


 


Ur Squamous Epith Cells   0-5   (0-5)  /hpf


 


Urine Bacteria   Moderate H   (FEW)  /hpf


 


Urine Mucus   Not seen   (FEW)  /hpf


 


Ketones     (0.0-0.3)  mM


 


SARS-CoV-2 RNA (YASH)  Negative    (NEGATIVE)  














  05/07/21 05/07/21 05/07/21 Range/Units





  02:12 02:12 02:12 


 


WBC  23.60 H    (3.98-10.04)  K/mm3


 


RBC  4.98    (3.98-5.22)  M/mm3


 


Hgb  14.4  D    (11.2-15.7)  gm/dl


 


Hct  48.5 H    (34.1-44.9)  %


 


MCV  97.4 H    (79.4-94.8)  fl


 


MCH  28.9    (25.6-32.2)  pg


 


MCHC  29.7 L    (32.2-35.5)  g/dl


 


RDW Std Deviation  54.0 H    (36.4-46.3)  fL


 


Plt Count  393 H D    (182-369)  K/mm3


 


MPV  11.5    (9.4-12.3)  fl


 


Neutrophils % (Manual)  82 H    (40-60)  %


 


Band Neutrophils %  0    (0-10)  %


 


Lymphocytes % (Manual)  14 L    (20-40)  %


 


Atypical Lymphs %  0    %


 


Monocytes % (Manual)  4    (2-10)  %


 


Eosinophils % (Manual)  0 L    (0.7-5.8)  %


 


Basophils % (Manual)  0 L    (0.1-1.2)  


 


Platelet Estimate  Adequate    


 


Hypochromasia  1+ slight    


 


RBC Morph Comment  Normal    


 


PT    12.0  (9.7-12.0)  SECONDS


 


INR    1.12  


 


APTT    24.7  (21.7-31.4)  SECONDS


 


D-Dimer, Quantitative    1.18 H  (0.19-0.50)  mg/L


 


Puncture Site     


 


ABG pH     (7.35-7.45)  


 


ABG pCO2     (35.0-45.0)  mmHg


 


ABG pO2     (80.0-100.0)  mmHg


 


ABG HCO3     (22.0-26.0)  meq/L


 


ABG O2 Saturation     (96.0-97.0)  %


 


ABG Base Excess     (-2-2.0)  


 


Sb Test     


 


A-a Gradient     mmHg


 


O2 Delivery Device     


 


Oxygen Flow Rate     


 


FiO2     (21..00)  %


 


Sodium   152 H D   (136-145)  mEq/L


 


Potassium   4.3   (3.5-5.1)  mEq/L


 


Chloride   114 H   ()  mEq/L


 


Carbon Dioxide   23   (21-32)  mEq/L


 


Anion Gap   19.3 H   (5-15)  


 


BUN   59 H D   (7-18)  mg/dL


 


Creatinine   1.8 H   (0.55-1.02)  mg/dL


 


Est Cr Clr Drug Dosing   TNP   


 


Estimated GFR (MDRD)   27   (>60)  mL/min


 


BUN/Creatinine Ratio   32.8 H   (14-18)  


 


Glucose   294 H   ()  mg/dL


 


Lactic Acid     (0.4-2.0)  mmol/L


 


Calcium   9.4  D   (8.5-10.1)  mg/dL


 


Magnesium   2.9 H   (1.8-2.4)  mg/dl


 


Total Bilirubin   0.8   (0.2-1.0)  mg/dL


 


AST   47 H   (15-37)  U/L


 


ALT   66 H   (14-59)  U/L


 


Alkaline Phosphatase   99   ()  U/L


 


Troponin I   < 0.017   (0.00-0.056)  ng/mL


 


NT-Pro-B Natriuret Pep     (0-450)  pg/mL


 


Total Protein   8.6 H   (6.4-8.2)  g/dl


 


Albumin   3.6   (3.4-5.0)  g/dl


 


Globulin   5.0   gm/dL


 


Albumin/Globulin Ratio   0.7 L   (1-2)  


 


TSH 3rd Generation   4.041 H   (0.358-3.74)  uIU/mL


 


Urine Color     (Yellow)  


 


Urine Appearance     (Clear)  


 


Urine pH     (5.0-8.0)  


 


Ur Specific Gravity     (1.005-1.030)  


 


Urine Protein     (Negative)  


 


Urine Glucose (UA)     (Negative)  


 


Urine Ketones     (Negative)  


 


Urine Occult Blood     (Negative)  


 


Urine Nitrite     (Negative)  


 


Urine Bilirubin     (Negative)  


 


Urine Urobilinogen     (0.2-1.0)  


 


Ur Leukocyte Esterase     (Negative)  


 


Urine RBC     (0-5)  /hpf


 


Urine WBC     (0-5)  /hpf


 


Urine WBC Clumps     (NOT SEEN)  /hpf


 


Ur Squamous Epith Cells     (0-5)  /hpf


 


Urine Bacteria     (FEW)  /hpf


 


Urine Mucus     (FEW)  /hpf


 


Ketones     (0.0-0.3)  mM


 


SARS-CoV-2 RNA (YASH)     (NEGATIVE)  














  05/07/21 05/07/21 05/07/21 Range/Units





  02:12 02:12 02:12 


 


WBC     (3.98-10.04)  K/mm3


 


RBC     (3.98-5.22)  M/mm3


 


Hgb     (11.2-15.7)  gm/dl


 


Hct     (34.1-44.9)  %


 


MCV     (79.4-94.8)  fl


 


MCH     (25.6-32.2)  pg


 


MCHC     (32.2-35.5)  g/dl


 


RDW Std Deviation     (36.4-46.3)  fL


 


Plt Count     (182-369)  K/mm3


 


MPV     (9.4-12.3)  fl


 


Neutrophils % (Manual)     (40-60)  %


 


Band Neutrophils %     (0-10)  %


 


Lymphocytes % (Manual)     (20-40)  %


 


Atypical Lymphs %     %


 


Monocytes % (Manual)     (2-10)  %


 


Eosinophils % (Manual)     (0.7-5.8)  %


 


Basophils % (Manual)     (0.1-1.2)  


 


Platelet Estimate     


 


Hypochromasia     


 


RBC Morph Comment     


 


PT     (9.7-12.0)  SECONDS


 


INR     


 


APTT     (21.7-31.4)  SECONDS


 


D-Dimer, Quantitative     (0.19-0.50)  mg/L


 


Puncture Site     


 


ABG pH     (7.35-7.45)  


 


ABG pCO2     (35.0-45.0)  mmHg


 


ABG pO2     (80.0-100.0)  mmHg


 


ABG HCO3     (22.0-26.0)  meq/L


 


ABG O2 Saturation     (96.0-97.0)  %


 


ABG Base Excess     (-2-2.0)  


 


Sb Test     


 


A-a Gradient     mmHg


 


O2 Delivery Device     


 


Oxygen Flow Rate     


 


FiO2     (21..00)  %


 


Sodium     (136-145)  mEq/L


 


Potassium     (3.5-5.1)  mEq/L


 


Chloride     ()  mEq/L


 


Carbon Dioxide     (21-32)  mEq/L


 


Anion Gap     (5-15)  


 


BUN     (7-18)  mg/dL


 


Creatinine     (0.55-1.02)  mg/dL


 


Est Cr Clr Drug Dosing     


 


Estimated GFR (MDRD)     (>60)  mL/min


 


BUN/Creatinine Ratio     (14-18)  


 


Glucose     ()  mg/dL


 


Lactic Acid   2.7 H*   (0.4-2.0)  mmol/L


 


Calcium     (8.5-10.1)  mg/dL


 


Magnesium     (1.8-2.4)  mg/dl


 


Total Bilirubin     (0.2-1.0)  mg/dL


 


AST     (15-37)  U/L


 


ALT     (14-59)  U/L


 


Alkaline Phosphatase     ()  U/L


 


Troponin I     (0.00-0.056)  ng/mL


 


NT-Pro-B Natriuret Pep  1474 H    (0-450)  pg/mL


 


Total Protein     (6.4-8.2)  g/dl


 


Albumin     (3.4-5.0)  g/dl


 


Globulin     gm/dL


 


Albumin/Globulin Ratio     (1-2)  


 


TSH 3rd Generation     (0.358-3.74)  uIU/mL


 


Urine Color     (Yellow)  


 


Urine Appearance     (Clear)  


 


Urine pH     (5.0-8.0)  


 


Ur Specific Gravity     (1.005-1.030)  


 


Urine Protein     (Negative)  


 


Urine Glucose (UA)     (Negative)  


 


Urine Ketones     (Negative)  


 


Urine Occult Blood     (Negative)  


 


Urine Nitrite     (Negative)  


 


Urine Bilirubin     (Negative)  


 


Urine Urobilinogen     (0.2-1.0)  


 


Ur Leukocyte Esterase     (Negative)  


 


Urine RBC     (0-5)  /hpf


 


Urine WBC     (0-5)  /hpf


 


Urine WBC Clumps     (NOT SEEN)  /hpf


 


Ur Squamous Epith Cells     (0-5)  /hpf


 


Urine Bacteria     (FEW)  /hpf


 


Urine Mucus     (FEW)  /hpf


 


Ketones    0.27  (0.0-0.3)  mM


 


SARS-CoV-2 RNA (YASH)     (NEGATIVE)  














  05/07/21 Range/Units





  05:07 


 


WBC   (3.98-10.04)  K/mm3


 


RBC   (3.98-5.22)  M/mm3


 


Hgb   (11.2-15.7)  gm/dl


 


Hct   (34.1-44.9)  %


 


MCV   (79.4-94.8)  fl


 


MCH   (25.6-32.2)  pg


 


MCHC   (32.2-35.5)  g/dl


 


RDW Std Deviation   (36.4-46.3)  fL


 


Plt Count   (182-369)  K/mm3


 


MPV   (9.4-12.3)  fl


 


Neutrophils % (Manual)   (40-60)  %


 


Band Neutrophils %   (0-10)  %


 


Lymphocytes % (Manual)   (20-40)  %


 


Atypical Lymphs %   %


 


Monocytes % (Manual)   (2-10)  %


 


Eosinophils % (Manual)   (0.7-5.8)  %


 


Basophils % (Manual)   (0.1-1.2)  


 


Platelet Estimate   


 


Hypochromasia   


 


RBC Morph Comment   


 


PT   (9.7-12.0)  SECONDS


 


INR   


 


APTT   (21.7-31.4)  SECONDS


 


D-Dimer, Quantitative   (0.19-0.50)  mg/L


 


Puncture Site   


 


ABG pH   (7.35-7.45)  


 


ABG pCO2   (35.0-45.0)  mmHg


 


ABG pO2   (80.0-100.0)  mmHg


 


ABG HCO3   (22.0-26.0)  meq/L


 


ABG O2 Saturation   (96.0-97.0)  %


 


ABG Base Excess   (-2-2.0)  


 


Sb Test   


 


A-a Gradient   mmHg


 


O2 Delivery Device   


 


Oxygen Flow Rate   


 


FiO2   (21..00)  %


 


Sodium   (136-145)  mEq/L


 


Potassium   (3.5-5.1)  mEq/L


 


Chloride   ()  mEq/L


 


Carbon Dioxide   (21-32)  mEq/L


 


Anion Gap   (5-15)  


 


BUN   (7-18)  mg/dL


 


Creatinine   (0.55-1.02)  mg/dL


 


Est Cr Clr Drug Dosing   


 


Estimated GFR (MDRD)   (>60)  mL/min


 


BUN/Creatinine Ratio   (14-18)  


 


Glucose   ()  mg/dL


 


Lactic Acid  2.8 H*  (0.4-2.0)  mmol/L


 


Calcium   (8.5-10.1)  mg/dL


 


Magnesium   (1.8-2.4)  mg/dl


 


Total Bilirubin   (0.2-1.0)  mg/dL


 


AST   (15-37)  U/L


 


ALT   (14-59)  U/L


 


Alkaline Phosphatase   ()  U/L


 


Troponin I   (0.00-0.056)  ng/mL


 


NT-Pro-B Natriuret Pep   (0-450)  pg/mL


 


Total Protein   (6.4-8.2)  g/dl


 


Albumin   (3.4-5.0)  g/dl


 


Globulin   gm/dL


 


Albumin/Globulin Ratio   (1-2)  


 


TSH 3rd Generation   (0.358-3.74)  uIU/mL


 


Urine Color   (Yellow)  


 


Urine Appearance   (Clear)  


 


Urine pH   (5.0-8.0)  


 


Ur Specific Gravity   (1.005-1.030)  


 


Urine Protein   (Negative)  


 


Urine Glucose (UA)   (Negative)  


 


Urine Ketones   (Negative)  


 


Urine Occult Blood   (Negative)  


 


Urine Nitrite   (Negative)  


 


Urine Bilirubin   (Negative)  


 


Urine Urobilinogen   (0.2-1.0)  


 


Ur Leukocyte Esterase   (Negative)  


 


Urine RBC   (0-5)  /hpf


 


Urine WBC   (0-5)  /hpf


 


Urine WBC Clumps   (NOT SEEN)  /hpf


 


Ur Squamous Epith Cells   (0-5)  /hpf


 


Urine Bacteria   (FEW)  /hpf


 


Urine Mucus   (FEW)  /hpf


 


Ketones   (0.0-0.3)  mM


 


SARS-CoV-2 RNA (YASH)   (NEGATIVE)  











Result Diagrams: 


                                 05/07/21 02:12





                                 05/07/21 02:12





Sepsis Event Note





- Evaluation


Sepsis Screening Result: No Definite Risk





- Focused Exam


Vital Signs: 


                                   Vital Signs











  Temp Pulse Resp BP Pulse Ox


 


 05/07/21 07:40   71  20  123/76  95


 


 05/07/21 01:45   98  24 H  108/71  93 L


 


 05/07/21 01:41  97.3 F  100  24 H  92/76  88 L











Problem List Initiated/Reviewed/Updated: Yes


Orders Last 24hrs: 


                               Active Orders 24 hr











 Category Date Time Status


 


 Patient Status [ADT] Routine ADT  05/07/21 06:49 Active


 


 CULTURE BLOOD [BC] Stat Lab  05/07/21 02:35 Received


 


 CULTURE BLOOD [BC] Stat Lab  05/07/21 02:40 Received


 


 CULTURE URINE [RM] Stat Lab  05/07/21 03:13 Ordered


 


 METH-RESIST S.AUR,MRSA BY PCR [MOLEC] Routine Lab  05/07/21 08:30 Received


 


 Sodium Chloride 0.9% [Normal Saline] 45 ml Med  05/07/21 03:45 Active





 IV ASDIRECTED   


 


 Sodium Chloride 0.9% [Saline Flush] Med  05/07/21 03:39 Active





 10 ml FLUSH ONETIME PRN   


 


 Blood Culture x2 Reflex Set [OM.PC] Stat Oth  05/07/21 02:25 Ordered








                                Medication Orders





Sodium Chloride (Normal Saline)  45 mls @ 40 mls/hr IV ASDIRECTED [facility]


   Last Admin: 05/07/21 03:41  Dose: 40 mls/hr


   Documented by: CRYS


Sodium Chloride (Sodium Chloride 0.9% 10 Ml Syringe)  10 ml FLUSH ONETIME PRN


   PRN Reason: Keep Vein Open


   Last Admin: 05/07/21 03:41  Dose: 10 ml


   Documented by: CRYS








Assessment/Plan Comment:: 








A: 





Sepsis 


-Suspected Urinary Source: 3+ Leuk esterase, too numerous to count white blood 

cells, positive bacteria


-Patchy areas of increased density within the right upper lobe and right lower 

lung possibly representing pneumonia, ? aspiration 


-WBC 23.6, Lactic Acid 2.8 


-Neutrophils 82% 


-Negative COVID-19


-Recent admission on 4/6/2021 for complicated urinary tract infection at which 

time patient was started on Rocephin.  It appears patient had both Aerococcus as

 well as E. coli in the urine, both were sensitive to Rocephin.  In 2017 patient

 had a urine culture for E. coli that was resistant to fluoroquinolones





Acute Hypoxic Respiratory Failure 


-PO2 74, O2% 94.5 on ABG 





Hypernatremia 


-Na 152, suspect secondary to intravascular volume depletion 





JYOTI 


-BUN 59, Cr 1.8 in setting of intravascular volume depletion 


-CTA in the ED 





Elevated proBNP 


-1474 





Transaminitis 


-Mild AST 47, ALT 66 


-Normal Alk/Tbili 


-No evidence of obstruction/acue leeroy 





Elevated D dimer


-1.18, CTA in ED no PE 





Advanced Dementia 


-Baseline A/O x 1 








Plan: 


-Admit to inpatient 


-Rocephin, Azithromycin, DC Levaquin given resistance profile of previous 

cultures 


-D5W @ 75cc/hr given hypernatremia, intra-vascular volume depletion 


-Follow Serum Cr, Na 


-Titrate oxygen to maintain spo2 >90% 


-Blood cultures/urine cultures pending 


-SCDs 


-Cooling measures prn 


-Sepsis bundles 


-Repeat lactic acid at 1300 


-Heparin subq bid, SCD for DVT prophylaxis 


-Case mgmt 


-Regular diet when able 


-DNR/DNI 





- Mortality Measure


Prognosis:: Good

## 2021-05-07 NOTE — CT
Head CT

 

Technique: Multiple axial sections through the brain were obtained.  

Intravenous contrast was not utilized.  Reconstructed coronal and 

sagittal images were obtained.

 

Comparison: Prior head CT study of 03/03/14.

 

Findings: Ventricles along with basal cisterns and sulci over the 

convexities are moderately prominent.  Ventricular system has 

increased in size from previous exam.  Diminished density is noted 

within the periventricular white matter which is also increased from 

prior study.  There is diminished density most likely due to small 

vessel ischemic demyelination change.  No other abnormal parenchymal 

densities are seen.  No evidence of intracranial hemorrhage.  No 

midline shift or mass-effect is seen.

 

Bone window settings were reviewed which show no acute mastoid sinus 

or paranasal sinus findings.  Atherosclerotic change is seen within 

the carotid siphon and vertebral vessels.  No acute calvarial 

abnormality is appreciated.

 

Impression:

1.  Diffuse senescent change which has increased from prior head CT 

study.

2.  No acute intracranial abnormality is otherwise seen.

 

Diagnostic code #2

 

I agree with preliminary report from [facility], finalized on 05/09/21, 3:42

 AM CDT, code 1

## 2021-05-07 NOTE — CR
Chest: Portable view of the chest is obtained.

 

Comparison: Prior chest x-ray 04/08/21.

 

Chronically elevated right hemidiaphragm is seen.  

 

Patchy areas of interstitial change are seen within the right 

perihilar region.  Left lung appears to be fairly clear.  

 

Heart size appears within normal limits for portable technique.  

Tortuous thoracic aorta is seen.  Bony structures are grossly intact.

 

Impression:

1.  Increased interstitial change within the right perihilar region 

raising the possibility of diffuse bronchitis or early areas of 

pneumonia.  Please correlate if patient has infectious symptoms.

2.  Other findings believed to be chronic.

 

Diagnostic code #3

## 2021-05-08 RX ADMIN — HEPARIN SODIUM SCH UNITS: 5000 INJECTION, SOLUTION INTRAVENOUS; SUBCUTANEOUS at 21:58

## 2021-05-08 RX ADMIN — Medication SCH: at 08:40

## 2021-05-08 RX ADMIN — Medication SCH: at 21:46

## 2021-05-08 RX ADMIN — HEPARIN SODIUM SCH UNITS: 5000 INJECTION, SOLUTION INTRAVENOUS; SUBCUTANEOUS at 09:21

## 2021-05-08 NOTE — PCM.PN
- General Info


Date of Service: 05/08/21


Admission Dx/Problem (Free Text): 


                           Admission Diagnosis/Problem





Admission Diagnosis/Problem      Pyelonephritis








Subjective Update: 





Patient is a 88-year-old female with advanced dementia who resides in a nursing 

home presented to the emergency department after having a bout of hypotension at

the nursing facility. 





Patient is nonverbal and does not answer any questions.


I was reported that patient has not been eating and drinking well since 2 days 

before patient came to the hospital.


RN Oswald and I spoke to daughter Penny ROBERTSON) by phone ([phone]).  I 

explained patient condition and treatment options.  Penny would like to pursue 

palliative care for her.  Penny refused tube feeding and no longer wants blood 

tests and imaging tests.  No aggressive treatment.  But she would like to 

continue antibiotic, IV fluid and oxygen. 





- Review of Systems


Systems Review Comment:: 





Unable to obtain because patient does not answer any questions.





- Patient Data


Vitals - Most Recent: 


                                Last Vital Signs











Temp  35.4 C L  05/08/21 08:03


 


Pulse  45 L  05/08/21 08:03


 


Resp  14   05/08/21 08:03


 


BP  112/37 L  05/08/21 08:03


 


Pulse Ox  95   05/08/21 08:03











Weight - Most Recent: 58.468 kg


I&O - Last 24 Hours: 


                                 Intake & Output











 05/07/21 05/08/21 05/08/21





 22:59 06:59 14:59


 


Intake Total 1020 880 


 


Balance 1020 880 











Lab Results Last 24 Hours: 


                         Laboratory Results - last 24 hr











  05/07/21 05/07/21 05/07/21 Range/Units





  13:18 15:57 18:45 


 


WBC     (3.98-10.04)  K/mm3


 


RBC     (3.98-5.22)  M/mm3


 


Hgb     (11.2-15.7)  gm/dl


 


Hct     (34.1-44.9)  %


 


MCV     (79.4-94.8)  fl


 


MCH     (25.6-32.2)  pg


 


MCHC     (32.2-35.5)  g/dl


 


RDW Std Deviation     (36.4-46.3)  fL


 


Plt Count     (182-369)  K/mm3


 


MPV     (9.4-12.3)  fl


 


Neut % (Auto)     (34.0-71.1)  %


 


Lymph % (Auto)     (19.3-51.7)  %


 


Mono % (Auto)     (4.7-12.5)  %


 


Eos % (Auto)     (0.7-5.8)  


 


Baso % (Auto)     (0.1-1.2)  %


 


Neut # (Auto)     (1.56-6.13)  K/mm3


 


Lymph # (Auto)     (1.18-3.74)  K/mm3


 


Mono # (Auto)     (0.24-0.36)  K/mm3


 


Eos # (Auto)     (0.04-0.36)  K/mm3


 


Baso # (Auto)     (0.01-0.08)  K/mm3


 


Manual Slide Review     


 


Sodium     (136-145)  mEq/L


 


Potassium     (3.5-5.1)  mEq/L


 


Chloride     ()  mEq/L


 


Carbon Dioxide     (21-32)  mEq/L


 


Anion Gap     (5-15)  


 


BUN     (7-18)  mg/dL


 


Creatinine     (0.55-1.02)  mg/dL


 


Est Cr Clr Drug Dosing     mL/min


 


Estimated GFR (MDRD)     (>60)  mL/min


 


BUN/Creatinine Ratio     (14-18)  


 


Glucose     ()  mg/dL


 


Lactic Acid  2.4 H*  2.6 H*  2.4 H*  (0.4-2.0)  mmol/L


 


Calcium     (8.5-10.1)  mg/dL














  05/08/21 05/08/21 05/08/21 Range/Units





  01:30 04:30 04:30 


 


WBC   8.31   (3.98-10.04)  K/mm3


 


RBC   3.50 L   (3.98-5.22)  M/mm3


 


Hgb   10.2 L D   (11.2-15.7)  gm/dl


 


Hct   34.4   (34.1-44.9)  %


 


MCV   98.3 H   (79.4-94.8)  fl


 


MCH   29.1   (25.6-32.2)  pg


 


MCHC   29.7 L   (32.2-35.5)  g/dl


 


RDW Std Deviation   49.2 H   (36.4-46.3)  fL


 


Plt Count   203  D   (182-369)  K/mm3


 


MPV   11.2   (9.4-12.3)  fl


 


Neut % (Auto)   65.3   (34.0-71.1)  %


 


Lymph % (Auto)   22.9   (19.3-51.7)  %


 


Mono % (Auto)   6.1   (4.7-12.5)  %


 


Eos % (Auto)   4.9   (0.7-5.8)  


 


Baso % (Auto)   0.6   (0.1-1.2)  %


 


Neut # (Auto)   5.42   (1.56-6.13)  K/mm3


 


Lymph # (Auto)   1.90   (1.18-3.74)  K/mm3


 


Mono # (Auto)   0.51 H   (0.24-0.36)  K/mm3


 


Eos # (Auto)   0.41 H   (0.04-0.36)  K/mm3


 


Baso # (Auto)   0.05   (0.01-0.08)  K/mm3


 


Manual Slide Review   Abnormal smear   


 


Sodium    148 H  (136-145)  mEq/L


 


Potassium    3.0 L  (3.5-5.1)  mEq/L


 


Chloride    113 H  ()  mEq/L


 


Carbon Dioxide    20 L  (21-32)  mEq/L


 


Anion Gap    18.0 H  (5-15)  


 


BUN    33 H D  (7-18)  mg/dL


 


Creatinine    1.1 H  (0.55-1.02)  mg/dL


 


Est Cr Clr Drug Dosing    30.53  mL/min


 


Estimated GFR (MDRD)    47  (>60)  mL/min


 


BUN/Creatinine Ratio    30.0 H  (14-18)  


 


Glucose    217 H  ()  mg/dL


 


Lactic Acid  2.4 H*    (0.4-2.0)  mmol/L


 


Calcium    7.5 L D  (8.5-10.1)  mg/dL














  05/08/21 05/08/21 Range/Units





  04:30 07:30 


 


WBC    (3.98-10.04)  K/mm3


 


RBC    (3.98-5.22)  M/mm3


 


Hgb    (11.2-15.7)  gm/dl


 


Hct    (34.1-44.9)  %


 


MCV    (79.4-94.8)  fl


 


MCH    (25.6-32.2)  pg


 


MCHC    (32.2-35.5)  g/dl


 


RDW Std Deviation    (36.4-46.3)  fL


 


Plt Count    (182-369)  K/mm3


 


MPV    (9.4-12.3)  fl


 


Neut % (Auto)    (34.0-71.1)  %


 


Lymph % (Auto)    (19.3-51.7)  %


 


Mono % (Auto)    (4.7-12.5)  %


 


Eos % (Auto)    (0.7-5.8)  


 


Baso % (Auto)    (0.1-1.2)  %


 


Neut # (Auto)    (1.56-6.13)  K/mm3


 


Lymph # (Auto)    (1.18-3.74)  K/mm3


 


Mono # (Auto)    (0.24-0.36)  K/mm3


 


Eos # (Auto)    (0.04-0.36)  K/mm3


 


Baso # (Auto)    (0.01-0.08)  K/mm3


 


Manual Slide Review    


 


Sodium    (136-145)  mEq/L


 


Potassium    (3.5-5.1)  mEq/L


 


Chloride    ()  mEq/L


 


Carbon Dioxide    (21-32)  mEq/L


 


Anion Gap    (5-15)  


 


BUN    (7-18)  mg/dL


 


Creatinine    (0.55-1.02)  mg/dL


 


Est Cr Clr Drug Dosing    mL/min


 


Estimated GFR (MDRD)    (>60)  mL/min


 


BUN/Creatinine Ratio    (14-18)  


 


Glucose    ()  mg/dL


 


Lactic Acid  2.3 H*  2.2 H*  (0.4-2.0)  mmol/L


 


Calcium    (8.5-10.1)  mg/dL











Chris Results Last 24 Hours: 


                                  Microbiology











 05/07/21 02:40 Aerobic Blood Culture - Preliminary





 Blood - Venous - Lab Draw    NO GROWTH AFTER 1 DAY





 Anaerobic Blood Culture - Preliminary





    NO GROWTH AFTER 1 DAY


 


 05/07/21 02:35 Aerobic Blood Culture - Preliminary





 Blood - Venous    NO GROWTH AFTER 1 DAY





 Anaerobic Blood Culture - Preliminary





    NO GROWTH AFTER 1 DAY











Med Orders - Current: 


                               Current Medications





Acetaminophen (Acetaminophen 325 Mg Tab)  650 mg PO Q4H PRN


   PRN Reason: Fever


Bisacodyl (Bisacodyl 5 Mg Tab)  10 mg PO DAILY PRN


   PRN Reason: Constipation


Heparin Sodium (Porcine) (Heparin Sodium 5,000 Units/Ml Vial)  5,000 units 

SUBCUT Q12H [facility]


   Last Admin: 05/08/21 09:21 Dose:  5,000 units


   Documented by: 


Ceftriaxone Sodium 1 gm/ (Sodium Chloride)  100 mls @ 200 mls/hr IV Q24H ANA


   Last Admin: 05/08/21 09:20 Dose:  200 mls/hr


   Documented by: 


Azithromycin 500 mg/ Sodium (Chloride)  250 mls @ 250 mls/hr IV Q24H [facility]


   Last Admin: 05/08/21 10:16 Dose:  250 mls/hr


   Documented by: 


Potassium Chloride 20 meq/ (Dextrose/Water)  1,010 mls @ 75 mls/hr IV Q13H [facility]


   Last Admin: 05/08/21 08:36 Dose:  75 mls/hr


   Documented by: 


Ondansetron HCl (Ondansetron 4 Mg/2 Ml Sdv)  4 mg IVPUSH Q8H PRN


   PRN Reason: Nausea


Oxycodone/Acetaminophen (Acetaminophen/Oxycodone 325-5 Mg Tab)  1 tab PO Q4H PRN


   PRN Reason: Pain (moderate 4-6)


Pantoprazole Sodium (Pantoprazole 40 Mg Tab.Cr)  40 mg PO ACBREAKFAST [facility]


Polyethylene Glycol (Polyethylene Glycol 3350 Powder 17 Gm Packet)  17 gm PO 

DAILY [facility]


   Last Admin: 05/08/21 08:20 Dose:  Not Given


   Documented by: 


Saccharomyces Boulardii (Saccharomyces Boulardii (Probiotic) 250 Mg Cap)  250 mg

PO BID [facility]


   Last Admin: 05/08/21 08:40 Dose:  Not Given


   Documented by: 


Senna/Docusate Sodium (Docusate Sodium/Sennosides 50-8.6 Mg Tab)  1 tab PO BID 

[facility]


   Last Admin: 05/08/21 08:20 Dose:  Not Given


   Documented by: 


Sodium Chloride (Sodium Chloride 0.9% 10 Ml Syringe)  10 ml FLUSH ONETIME PRN


   PRN Reason: Keep Vein Open


   Last Admin: 05/07/21 03:41 Dose:  10 ml


   Documented by: 





Discontinued Medications





Sodium Chloride (Normal Saline)  500 mls @ 1,000 mls/hr IV .BOLUS ONE


   Stop: 05/07/21 03:05


   Last Admin: 05/07/21 02:40 Dose:  1,000 mls/hr


   Documented by: 


Sodium Chloride (Normal Saline)  1,000 mls @ 999 mls/hr IV ONETIME ONE


   Stop: 05/07/21 04:09


   Last Admin: 05/07/21 03:56 Dose:  999 mls/hr


   Documented by: 


Levofloxacin/Dextrose 750 mg/ (Premix)  150 mls @ 100 mls/hr IV ONETIME STA


   Stop: 05/07/21 04:49


   Last Admin: 05/07/21 03:47 Dose:  100 mls/hr


   Documented by: 


Sodium Chloride (Normal Saline)  45 mls @ 40 mls/hr IV ASDIRECTED ANA


   Last Admin: 05/07/21 03:41 Dose:  40 mls/hr


   Documented by: 


Sodium Chloride (Normal Saline)  1,000 mls @ 500 mls/hr IV ONETIME ONE


   Stop: 05/07/21 06:27


   Last Admin: 05/07/21 05:31 Dose:  500 mls/hr


   Documented by: 


Norepinephrine Bitartrate 4 mg (/ Dextrose/Water)  250 mls @ 7.5 mls/hr IV T

ITRATE ANA; Protocol


Dextrose/Water (Dextrose 5% In Water)  1,000 mls @ 75 mls/hr IV ASDIRECTED ANA


   Last Admin: 05/07/21 09:59 Dose:  75 mls/hr


   Documented by: 


Dextrose/Water (Dextrose 5% In Water)  1,000 mls @ 100 mls/hr IV ASDIRECTED ANA


   Last Admin: 05/07/21 21:08 Dose:  100 mls/hr


   Documented by: 


Potassium Chloride 20 meq/ (Dextrose/Water)  1,010 mls @ 75.75 mls/hr IV 

ASDIRECTED ANA


Iopamidol (Iopamidol 755 Mg/Ml 100 Ml Bottle)  100 ml IVPUSH ONETIME ONE


   Stop: 05/07/21 03:40


   Last Admin: 05/07/21 03:41 Dose:  100 ml


   Documented by: 











- Exam


General: No Acute Distress


HEENT: Pupils Equal, Pupils Reactive


Neck: No JVD


Lungs: Normal Respiratory Effort


Cardiovascular: Regular Rate, Regular Rhythm


GI/Abdominal Exam: Normal Bowel Sounds, Soft, No Organomegaly


Extremities: Normal Inspection, No Pedal Edema


Skin: Warm, Dry, Intact


Neurological: Other (Unable to complete because the patient does not answer any 

questions)


Psy/Mental Status: Other


Physical Findings Comments:: 





Unable to complete because patient does not answer any questions





- Patient Data


Lab Results Last 24 hrs: 


                         Laboratory Results - last 24 hr











  05/07/21 05/07/21 05/07/21 Range/Units





  13:18 15:57 18:45 


 


WBC     (3.98-10.04)  K/mm3


 


RBC     (3.98-5.22)  M/mm3


 


Hgb     (11.2-15.7)  gm/dl


 


Hct     (34.1-44.9)  %


 


MCV     (79.4-94.8)  fl


 


MCH     (25.6-32.2)  pg


 


MCHC     (32.2-35.5)  g/dl


 


RDW Std Deviation     (36.4-46.3)  fL


 


Plt Count     (182-369)  K/mm3


 


MPV     (9.4-12.3)  fl


 


Neut % (Auto)     (34.0-71.1)  %


 


Lymph % (Auto)     (19.3-51.7)  %


 


Mono % (Auto)     (4.7-12.5)  %


 


Eos % (Auto)     (0.7-5.8)  


 


Baso % (Auto)     (0.1-1.2)  %


 


Neut # (Auto)     (1.56-6.13)  K/mm3


 


Lymph # (Auto)     (1.18-3.74)  K/mm3


 


Mono # (Auto)     (0.24-0.36)  K/mm3


 


Eos # (Auto)     (0.04-0.36)  K/mm3


 


Baso # (Auto)     (0.01-0.08)  K/mm3


 


Manual Slide Review     


 


Sodium     (136-145)  mEq/L


 


Potassium     (3.5-5.1)  mEq/L


 


Chloride     ()  mEq/L


 


Carbon Dioxide     (21-32)  mEq/L


 


Anion Gap     (5-15)  


 


BUN     (7-18)  mg/dL


 


Creatinine     (0.55-1.02)  mg/dL


 


Est Cr Clr Drug Dosing     mL/min


 


Estimated GFR (MDRD)     (>60)  mL/min


 


BUN/Creatinine Ratio     (14-18)  


 


Glucose     ()  mg/dL


 


Lactic Acid  2.4 H*  2.6 H*  2.4 H*  (0.4-2.0)  mmol/L


 


Calcium     (8.5-10.1)  mg/dL














  05/08/21 05/08/21 05/08/21 Range/Units





  01:30 04:30 04:30 


 


WBC   8.31   (3.98-10.04)  K/mm3


 


RBC   3.50 L   (3.98-5.22)  M/mm3


 


Hgb   10.2 L D   (11.2-15.7)  gm/dl


 


Hct   34.4   (34.1-44.9)  %


 


MCV   98.3 H   (79.4-94.8)  fl


 


MCH   29.1   (25.6-32.2)  pg


 


MCHC   29.7 L   (32.2-35.5)  g/dl


 


RDW Std Deviation   49.2 H   (36.4-46.3)  fL


 


Plt Count   203  D   (182-369)  K/mm3


 


MPV   11.2   (9.4-12.3)  fl


 


Neut % (Auto)   65.3   (34.0-71.1)  %


 


Lymph % (Auto)   22.9   (19.3-51.7)  %


 


Mono % (Auto)   6.1   (4.7-12.5)  %


 


Eos % (Auto)   4.9   (0.7-5.8)  


 


Baso % (Auto)   0.6   (0.1-1.2)  %


 


Neut # (Auto)   5.42   (1.56-6.13)  K/mm3


 


Lymph # (Auto)   1.90   (1.18-3.74)  K/mm3


 


Mono # (Auto)   0.51 H   (0.24-0.36)  K/mm3


 


Eos # (Auto)   0.41 H   (0.04-0.36)  K/mm3


 


Baso # (Auto)   0.05   (0.01-0.08)  K/mm3


 


Manual Slide Review   Abnormal smear   


 


Sodium    148 H  (136-145)  mEq/L


 


Potassium    3.0 L  (3.5-5.1)  mEq/L


 


Chloride    113 H  ()  mEq/L


 


Carbon Dioxide    20 L  (21-32)  mEq/L


 


Anion Gap    18.0 H  (5-15)  


 


BUN    33 H D  (7-18)  mg/dL


 


Creatinine    1.1 H  (0.55-1.02)  mg/dL


 


Est Cr Clr Drug Dosing    30.53  mL/min


 


Estimated GFR (MDRD)    47  (>60)  mL/min


 


BUN/Creatinine Ratio    30.0 H  (14-18)  


 


Glucose    217 H  ()  mg/dL


 


Lactic Acid  2.4 H*    (0.4-2.0)  mmol/L


 


Calcium    7.5 L D  (8.5-10.1)  mg/dL














  05/08/21 05/08/21 Range/Units





  04:30 07:30 


 


WBC    (3.98-10.04)  K/mm3


 


RBC    (3.98-5.22)  M/mm3


 


Hgb    (11.2-15.7)  gm/dl


 


Hct    (34.1-44.9)  %


 


MCV    (79.4-94.8)  fl


 


MCH    (25.6-32.2)  pg


 


MCHC    (32.2-35.5)  g/dl


 


RDW Std Deviation    (36.4-46.3)  fL


 


Plt Count    (182-369)  K/mm3


 


MPV    (9.4-12.3)  fl


 


Neut % (Auto)    (34.0-71.1)  %


 


Lymph % (Auto)    (19.3-51.7)  %


 


Mono % (Auto)    (4.7-12.5)  %


 


Eos % (Auto)    (0.7-5.8)  


 


Baso % (Auto)    (0.1-1.2)  %


 


Neut # (Auto)    (1.56-6.13)  K/mm3


 


Lymph # (Auto)    (1.18-3.74)  K/mm3


 


Mono # (Auto)    (0.24-0.36)  K/mm3


 


Eos # (Auto)    (0.04-0.36)  K/mm3


 


Baso # (Auto)    (0.01-0.08)  K/mm3


 


Manual Slide Review    


 


Sodium    (136-145)  mEq/L


 


Potassium    (3.5-5.1)  mEq/L


 


Chloride    ()  mEq/L


 


Carbon Dioxide    (21-32)  mEq/L


 


Anion Gap    (5-15)  


 


BUN    (7-18)  mg/dL


 


Creatinine    (0.55-1.02)  mg/dL


 


Est Cr Clr Drug Dosing    mL/min


 


Estimated GFR (MDRD)    (>60)  mL/min


 


BUN/Creatinine Ratio    (14-18)  


 


Glucose    ()  mg/dL


 


Lactic Acid  2.3 H*  2.2 H*  (0.4-2.0)  mmol/L


 


Calcium    (8.5-10.1)  mg/dL











Result Diagrams: 


                                 05/08/21 04:30





                                 05/08/21 04:30


Chris Results Last 24 hrs: 


                                  Microbiology











 05/07/21 02:40 Aerobic Blood Culture - Preliminary





 Blood - Venous - Lab Draw    NO GROWTH AFTER 1 DAY





 Anaerobic Blood Culture - Preliminary





    NO GROWTH AFTER 1 DAY


 


 05/07/21 02:35 Aerobic Blood Culture - Preliminary





 Blood - Venous    NO GROWTH AFTER 1 DAY





 Anaerobic Blood Culture - Preliminary





    NO GROWTH AFTER 1 DAY














Sepsis Event Note





- Evaluation


Sepsis Screening Result: No Definite Risk





- Focused Exam


Vital Signs: 


                                   Vital Signs











  Temp Pulse Resp BP BP Pulse Ox Pulse Ox


 


 05/08/21 08:03  35.4 C L  45 L  14  112/37 L   95 


 


 05/08/21 07:58        95


 


 05/08/21 04:39  36.6 C  89  13   101/48 L  97 


 


 05/08/21 00:58  36.7 C  72  12   108/54 L  98 














- Problem List Review


Problem List Initiated/Reviewed/Updated: Yes





- My Orders


Last 24 Hours: 


My Active Orders





05/08/21 07:38


bisacodyL [Dulcolax]   10 mg PO DAILY PRN 





05/08/21 07:52


Venous Doppler Lwr Ext Bi [US] Routine 





05/08/21 08:45


Potassium Chloride 20 meq   Dextrose 5% in Water 1,000 ml IV Q13H 





05/08/21 09:00


Docusate Sodium/Sennosides [Senna Plus]   1 tab PO BID 


Saccharomyces Boulardii [Florastor]   250 mg PO BID 


polyethylene glycoL 3350 [MiraLAX]   17 gm PO DAILY 





05/08/21 09:16


Patient Status [ADT] Routine 





05/08/21 13:30


LACTIC ACID [CHEM] Routine 





05/09/21 05:00


CBC WITH AUTO DIFF [HEME] DAILY 


COMPREHENSIVE METABOLIC PN,CMP [CHEM] DAILY 





05/09/21 06:00


Pantoprazole [ProTONIX***]   40 mg PO ACBREAKFAST 





05/10/21 05:00


CBC WITH AUTO DIFF [HEME] DAILY 


COMPREHENSIVE METABOLIC PN,CMP [CHEM] DAILY 





05/11/21 05:00


CBC WITH AUTO DIFF [HEME] DAILY 


COMPREHENSIVE METABOLIC PN,CMP [CHEM] DAILY 





05/12/21 05:00


CBC WITH AUTO DIFF [HEME] DAILY 


COMPREHENSIVE METABOLIC PN,CMP [CHEM] DAILY 





05/13/21 05:00


CBC WITH AUTO DIFF [HEME] DAILY 


COMPREHENSIVE METABOLIC PN,CMP [CHEM] DAILY 














- Plan


Plan:: 








Assessment:





Sepsis 


-Suspected Urinary Source: 3+ Leuk esterase, too numerous to count white blood 

cells, positive bacteria


-Patchy areas of increased density within the right upper lobe and right lower 

lung possibly representing pneumonia, ? aspiration 


-Leukocytosis, elevation of lactic Acid


-Negative COVID-19


-Recent admission on 4/6/2021 for complicated urinary tract infection at which 

time patient was started on Rocephin.  It appears patient had both Aerococcus as

 well as E. coli in the urine, both were sensitive to Rocephin.  In 2017 patient

 had a urine culture for E. coli that was resistant to fluoroquinolones





Acute Hypoxic Respiratory Failure 


-PO2 74, O2% 94.5 on ABG 





Hypernatremia 


-Na 152 on admission, suspect secondary to intravascular volume depletion 





JYOTI 


-BUN 59, Cr 1.8 on admission 


-CTA in the ED 





Elevated proBNP 


-1474 





Transaminitis 


-Mild AST 47, ALT 66 on admission


-Normal Alk/Tbili 


-No evidence of obstruction/acue leeroy 





Elevated D dimer


-1.18, CTA in ED no PE 





Advanced Dementia 


-Baseline A/O x 1 








Plan: 


- Daughter Penny (POA) would like to pursue palliative care for her.  No tube 

feeding. No blood tests and imaging tests.  No aggressive treatment.  But she


   would like to continue antibiotic, IV fluid and oxygen. 


-Continue Rocephin and Azithromycin 


-D5W+KCl 20mEq @ 75cc/hr 


-continue oxygen 


-SCDs 


-Heparin subq bid, SCD for DVT prophylaxis 


-DNR/DNI/palliative care





Deposition:


/ help with initiating palliative care

## 2021-05-09 RX ADMIN — HEPARIN SODIUM SCH UNITS: 5000 INJECTION, SOLUTION INTRAVENOUS; SUBCUTANEOUS at 10:00

## 2021-05-09 RX ADMIN — Medication SCH MG: at 21:54

## 2021-05-09 RX ADMIN — HEPARIN SODIUM SCH UNITS: 5000 INJECTION, SOLUTION INTRAVENOUS; SUBCUTANEOUS at 21:54

## 2021-05-09 RX ADMIN — Medication SCH MG: at 09:51

## 2021-05-09 NOTE — PCM.PN
- General Info


Date of Service: 05/09/21


Admission Dx/Problem (Free Text): 


                           Admission Diagnosis/Problem





Admission Diagnosis/Problem      Pyelonephritis








Subjective Update: 





Patient is a 88-year-old female with advanced dementia who resides in a nursing 

home presented to the emergency department after having a bout of hypotension at

the nursing facility. 





Patient is nonverbal and does not answer any questions.  She seems to be 

comfortable.


She is now on palliative care


When I saw this patient this morning, RN was feeding her.








- Review of Systems


Systems Review Comment:: 





Unable to obtain because patient does not answer any questions.








- Patient Data


Vitals - Most Recent: 


                                Last Vital Signs











Temp  36.0 C L  05/09/21 11:25


 


Pulse  71   05/09/21 11:25


 


Resp  14   05/09/21 11:25


 


BP  95/55 L  05/09/21 11:25


 


Pulse Ox  94 L  05/09/21 11:25











Weight - Most Recent: 58.695 kg


I&O - Last 24 Hours: 


                                 Intake & Output











 05/08/21 05/09/21 05/09/21





 22:59 06:59 14:59


 


Intake Total 816 938 120


 


Balance 816 938 120











Chris Results Last 24 Hours: 


                                  Microbiology











 05/07/21 02:40 Aerobic Blood Culture - Preliminary





 Blood - Venous - Lab Draw    NO GROWTH AFTER 2 DAYS





 Anaerobic Blood Culture - Preliminary





    NO GROWTH AFTER 2 DAYS


 


 05/07/21 02:35 Aerobic Blood Culture - Preliminary





 Blood - Venous    NO GROWTH AFTER 2 DAYS





 Anaerobic Blood Culture - Preliminary





    NO GROWTH AFTER 2 DAYS











Med Orders - Current: 


                               Current Medications





Acetaminophen (Acetaminophen 325 Mg Tab)  650 mg PO Q4H PRN


   PRN Reason: Fever


Bisacodyl (Bisacodyl 5 Mg Tab)  10 mg PO DAILY PRN


   PRN Reason: Constipation


Heparin Sodium (Porcine) (Heparin Sodium 5,000 Units/Ml Vial)  5,000 units CHAPPELL

BCUT Q12H [facility]


   Last Admin: 05/09/21 10:00 Dose:  5,000 units


   Documented by: 


Ceftriaxone Sodium 1 gm/ (Sodium Chloride)  100 mls @ 200 mls/hr IV Q24H [facility]


   Last Admin: 05/09/21 10:03 Dose:  200 mls/hr


   Documented by: 


Azithromycin 500 mg/ Sodium (Chloride)  250 mls @ 250 mls/hr IV Q24H [facility]


   Last Admin: 05/09/21 10:43 Dose:  250 mls/hr


   Documented by: 


Potassium Chloride 20 meq/ (Dextrose/Water)  1,010 mls @ 75 mls/hr IV Q13H [facility]


   Last Admin: 05/09/21 12:11 Dose:  75 mls/hr


   Documented by: 


Ondansetron HCl (Ondansetron 4 Mg/2 Ml Sdv)  4 mg IVPUSH Q8H PRN


   PRN Reason: Nausea


Oxycodone/Acetaminophen (Acetaminophen/Oxycodone 325-5 Mg Tab)  1 tab PO Q4H PRN


   PRN Reason: Pain (moderate 4-6)


Pantoprazole Sodium (Pantoprazole 40 Mg Tab.Cr)  40 mg PO ACBREAKFAST [facility]


   Last Admin: 05/09/21 06:27 Dose:  Not Given


   Documented by: 


Polyethylene Glycol (Polyethylene Glycol 3350 Powder 17 Gm Packet)  17 gm PO 

DAILY [facility]


   Last Admin: 05/09/21 10:01 Dose:  17 gm


   Documented by: 


Saccharomyces Boulardii (Saccharomyces Boulardii (Probiotic) 250 Mg Cap)  250 mg

PO BID [facility]


   Last Admin: 05/09/21 09:51 Dose:  250 mg


   Documented by: 


Senna/Docusate Sodium (Docusate Sodium/Sennosides 50-8.6 Mg Tab)  1 tab PO BID 

[facility]


   Last Admin: 05/09/21 09:51 Dose:  1 tab


   Documented by: 


Sodium Chloride (Sodium Chloride 0.9% 10 Ml Syringe)  10 ml FLUSH ONETIME PRN


   PRN Reason: Keep Vein Open


   Last Admin: 05/07/21 03:41 Dose:  10 ml


   Documented by: 





Discontinued Medications





Sodium Chloride (Normal Saline)  500 mls @ 1,000 mls/hr IV .BOLUS ONE


   Stop: 05/07/21 03:05


   Last Admin: 05/07/21 02:40 Dose:  1,000 mls/hr


   Documented by: 


Sodium Chloride (Normal Saline)  1,000 mls @ 999 mls/hr IV ONETIME ONE


   Stop: 05/07/21 04:09


   Last Admin: 05/07/21 03:56 Dose:  999 mls/hr


   Documented by: 


Levofloxacin/Dextrose 750 mg/ (Premix)  150 mls @ 100 mls/hr IV ONETIME STA


   Stop: 05/07/21 04:49


   Last Admin: 05/07/21 03:47 Dose:  100 mls/hr


   Documented by: 


Sodium Chloride (Normal Saline)  45 mls @ 40 mls/hr IV ASDIRECTED ANA


   Last Admin: 05/07/21 03:41 Dose:  40 mls/hr


   Documented by: 


Sodium Chloride (Normal Saline)  1,000 mls @ 500 mls/hr IV ONETIME ONE


   Stop: 05/07/21 06:27


   Last Admin: 05/07/21 05:31 Dose:  500 mls/hr


   Documented by: 


Norepinephrine Bitartrate 4 mg (/ Dextrose/Water)  250 mls @ 7.5 mls/hr IV TITRA

TE ANA; Protocol


Dextrose/Water (Dextrose 5% In Water)  1,000 mls @ 75 mls/hr IV ASDIRECTED ANA


   Last Admin: 05/07/21 09:59 Dose:  75 mls/hr


   Documented by: 


Dextrose/Water (Dextrose 5% In Water)  1,000 mls @ 100 mls/hr IV ASDIRECTED ANA


   Last Admin: 05/07/21 21:08 Dose:  100 mls/hr


   Documented by: 


Potassium Chloride 20 meq/ (Dextrose/Water)  1,010 mls @ 75.75 mls/hr IV 

ASDIRECTED [facility]


Iopamidol (Iopamidol 755 Mg/Ml 100 Ml Bottle)  100 ml IVPUSH ONETIME ONE


   Stop: 05/07/21 03:40


   Last Admin: 05/07/21 03:41 Dose:  100 ml


   Documented by: 











- Exam


Physical Findings Comments:: 





General: No Acute Distress


HEENT: Pupils Equal, Pupils Reactive


Neck: No JVD


Lungs: Normal Respiratory Effort


Cardiovascular: Regular Rate, Regular Rhythm


GI/Abdominal Exam: Normal Bowel Sounds, Soft, No Organomegaly


Extremities: Normal Inspection, No Pedal Edema


Skin: Warm, Dry, Intact


Neurological: Other (Unable to complete because the patient does not answer any 

questions)


Psy/Mental Status: Other


Physical Findings Comments: Unable to complete because patient does not answer 

any questions





- Patient Data


Result Diagrams: 


                                 05/08/21 04:30





                                 05/08/21 04:30


Chris Results Last 24 hrs: 


                                  Microbiology











 05/07/21 02:40 Aerobic Blood Culture - Preliminary





 Blood - Venous - Lab Draw    NO GROWTH AFTER 2 DAYS





 Anaerobic Blood Culture - Preliminary





    NO GROWTH AFTER 2 DAYS


 


 05/07/21 02:35 Aerobic Blood Culture - Preliminary





 Blood - Venous    NO GROWTH AFTER 2 DAYS





 Anaerobic Blood Culture - Preliminary





    NO GROWTH AFTER 2 DAYS














Sepsis Event Note





- Evaluation


Sepsis Screening Result: No Definite Risk





- Focused Exam


Vital Signs: 


                                   Vital Signs











  Temp Pulse Resp BP BP Pulse Ox Pulse Ox


 


 05/09/21 11:25  36.0 C L  71  14  95/55 L   94 L 


 


 05/09/21 08:07        93 L


 


 05/09/21 07:20  36.2 C  62  14  105/67   94 L 


 


 05/09/21 06:26       95 


 


 05/09/21 04:11  36.9 C  57 L  13   104/52 L  100 














- Problem List Review


Problem List Initiated/Reviewed/Updated: Yes





- My Orders


Last 24 Hours: 


My Active Orders





05/09/21 06:00


Pantoprazole [ProTONIX***]   40 mg PO ACBREAKFAST 





05/09/21 09:05


Patient Status [ADT] Routine 





05/09/21 12:26


Bedrest [RC] ASDIRECTED 














- Plan


Plan:: 








Assessment:





Sepsis 


-Suspected Urinary Source: 3+ Leuk esterase, too numerous to count white blood 

cells, positive bacteria


-Patchy areas of increased density within the right upper lobe and right lower 

lung possibly representing pneumonia, ? aspiration 


-Leukocytosis, elevation of lactic Acid


-Negative COVID-19


-Recent admission on 4/6/2021 for complicated urinary tract infection at which 

time patient was started on Rocephin.  It appears patient had both Aerococcus as

 well as E. coli in the urine, both were sensitive to Rocephin.  In 2017 patient

 had a urine culture for E. coli that was resistant to fluoroquinolones





Acute Hypoxic Respiratory Failure 


-PO2 74, O2% 94.5 on ABG 





Hypernatremia 


-Na 152 on admission, suspect secondary to intravascular volume depletion 





JYOTI 


-BUN 59, Cr 1.8 on admission 


-CTA in the ED 





Elevated proBNP 


-1474 





Transaminitis 


-Mild AST 47, ALT 66 on admission


-Normal Alk/Tbili 


-No evidence of obstruction/acue leeroy 





Elevated D dimer


-1.18, CTA in ED no PE 





Advanced Dementia 


-Baseline A/O x 1 








Plan: 


-Continue palliative care.  No tube feeding. No blood tests and imaging tests.  

No aggressive treatment.  


-continue Rocephin and Azithromycin, IV fluid and oxygen if needed. 


-D5W+KCl 20mEq @ 75cc/hr 


-SCDs 


-Heparin subq bid, SCD for DVT prophylaxis 


-DNR/DNI/palliative care





Deposition:


/ help with placement for palliative care tomorrow

## 2021-05-10 VITALS — DIASTOLIC BLOOD PRESSURE: 75 MMHG | SYSTOLIC BLOOD PRESSURE: 116 MMHG | HEART RATE: 62 BPM

## 2021-05-10 RX ADMIN — HEPARIN SODIUM SCH UNITS: 5000 INJECTION, SOLUTION INTRAVENOUS; SUBCUTANEOUS at 09:15

## 2021-05-10 RX ADMIN — Medication SCH MG: at 09:15

## 2021-05-10 NOTE — [id number].[id number]
**Discharge Summary





- Hospital Course


Free Text/Narrative:: 





Assessment:





Sepsis 


-Suspected Urinary Source: 3+ Leuk esterase, too numerous to count white blood 

cells, positive bacteria


-Patchy areas of increased density within the right upper lobe and right lower 

lung possibly representing pneumonia, ? aspiration 


-Leukocytosis, elevation of lactic Acid


-Negative COVID-19


-Recent admission on 4/6/2021 for complicated urinary tract infection at which 

time patient was started on Rocephin.  It appears patient had both Aerococcus as

well as E. coli in the urine, both were sensitive to Rocephin.  In 2017 patient 

had a urine culture for E. coli that was resistant to fluoroquinolones





Acute Hypoxic Respiratory Failure 


-PO2 74, O2% 94.5 on ABG 


-She is now on RM 





Hypernatremia 


-Na 152 on admission, suspect secondary to intravascular volume depletion 





JYOTI 


-BUN 59, Cr 1.8 on admission 


-CTA in the ED 





Elevated proBNP 


-1474 





Transaminitis 


-Mild AST 47, ALT 66 on admission


-Normal Alk/Tbili 


-No evidence of obstruction/acue leeroy 





Elevated D dimer


-1.18, CTA in ED no PE 





Advanced Dementia 


-Baseline A/O x 1 








Plan: 


-Continue palliative care.  No tube feeding. No blood tests and imaging tests.  

No aggressive treatment.  She will be discharged to SNF to continue palliative 

care. 


-will discontinue continue IV Rocephin and Azithromycin and discharged on Keflex

and p.o. azithromycin.  





Patient is nonverbal.  She is now on room air.  Vital signs are stable and 

acceptable.  She will be discharged to SNF to continue palliative care.  Her 

Metformin is on hold because we are not able to monitor her renal function.  

Follow with PCP and palliative care team within 3 days.  Call PCP and palliative

care team for medical issues.  She needs to be fed for 3 meals/day. 


Diagnosis: Stroke: No





- Discharge Data


Discharge Date: 05/10/21


Discharge Disposition: DC/Tfer to SNF 03


Condition: Poor





- Referral to Home Health


Primary Care Physician: 


Alexander Kline MD








- Patient Summary/Data


Consults: 


                                  Consultations





05/10/21 09:03


Consult to Hospice [CONS] Routine 


Consult to Palliative Care [CONS] Routine 











Recommended Follow-up Testing/Procedures: 





Follow with PCP and palliative care team within 3 days.  Call PCP and palliative

 care team for medical issues.





- Patient Instructions


Diet: Diabetic Diet


Activity: As Tolerated


Driving: Do Not Drive





- Discharge Plan


*PRESCRIPTION DRUG MONITORING PROGRAM REVIEWED*: Not Applicable


*COPY OF PRESCRIPTION DRUG MONITORING REPORT IN PATIENT MICHAEL: Not Applicable


Prescriptions/Med Rec: 


Azithromycin 500 mg PO DAILY #2 tablet


cephALEXin [Keflex] 500 mg PO BID #6 cap


Acetaminophen/oxyCODONE [Percocet 325-5 MG] 1 tab PO Q6HR PRN #10 tablet


 PRN Reason: Pain (Moderate 4-6)


Home Medications: 


                                    Home Meds





Docusate Sodium/Sennosides [Senna Plus] 1 tab PO BID 04/06/21 [History]


Lactobacillus Acidophilus/Fos [Acidophilus Probiotic Tablet] 1 tab PO BID 

04/06/21 [History]


Magnesium Hydroxide [Milk of Magnesia] 30 ml PO DAILY PRN 04/06/21 [History]


Memantine [Namenda Xr] 14 mg PO DAILY 04/06/21 [History]


Omeprazole 20 mg PO ACBREAKFAST 04/06/21 [History]


bisacodyL [Dulcolax] 10 mg RECTAL DAILY PRN 04/06/21 [History]


polyethylene glycoL 3350 [MiraLAX] 17 gm PO DAILY 04/06/21 [History]


bisacodyL [Bisacodyl] 10 mg PO DAILY PRN 05/07/21 [History]


Acetaminophen/oxyCODONE [Percocet 325-5 MG] 1 tab PO Q6HR PRN #10 tablet 

05/10/21 [Rx]


Azithromycin 500 mg PO DAILY #2 tablet 05/10/21 [Rx]


cephALEXin [Keflex] 500 mg PO BID #6 cap 05/10/21 [Rx]








Forms:  ED Department Discharge


Referrals: 


Alexander Kline MD [Primary Care Provider] - 05/20/21 2:30 pm


see, palliative care within 3 days [Other]





- Discharge Summary/Plan Comment


DC Time >30 min.: Yes





- General Info


Date of Service: 05/10/21


Admission Dx/Problem (Free Text: 


                           Admission Diagnosis/Problem





Admission Diagnosis/Problem      Pyelonephritis








Subjective Update: 





Patient is a 88-year-old female with advanced dementia who resides in a nursing 

home presented to the emergency department after having a bout of hypotension at

 the nursing facility. 





Patient is nonverbal and does not answer any questions.  She seems to be 

comfortable.


She is now on palliative care








- Review of Systems


Systems Review Comment: 





Unable to obtain because patient does not answer any questions.








- Patient Data


Vitals - Most Recent: 


                                Last Vital Signs











Temp  36.2 C   05/10/21 11:48


 


Pulse  62   05/10/21 11:48


 


Resp  14   05/10/21 11:48


 


BP  116/75   05/10/21 11:48


 


Pulse Ox  95   05/10/21 11:48











Weight - Most Recent: 59.647 kg


I&O - Last 24 hours: 


                                 Intake & Output











 05/09/21 05/10/21 05/10/21





 22:59 06:59 14:59


 


Intake Total 1202 1308 


 


Balance 1202 1308 











AMRIT Results - Last 24 hrs: 


                                  Microbiology











 05/07/21 02:40 Aerobic Blood Culture - Preliminary





 Blood - Venous - Lab Draw    NO GROWTH AFTER 3 DAYS





 Anaerobic Blood Culture - Preliminary





    NO GROWTH AFTER 3 DAYS


 


 05/07/21 02:35 Aerobic Blood Culture - Preliminary





 Blood - Venous    NO GROWTH AFTER 3 DAYS





 Anaerobic Blood Culture - Preliminary





    NO GROWTH AFTER 3 DAYS











Med Orders - Current: 


                               Current Medications





Acetaminophen (Acetaminophen 325 Mg Tab)  650 mg PO Q4H PRN


   PRN Reason: Fever


Bisacodyl (Bisacodyl 5 Mg Tab)  10 mg PO DAILY PRN


   PRN Reason: Constipation


Heparin Sodium (Porcine) (Heparin Sodium 5,000 Units/Ml Vial)  5,000 units 

SUBCUT Q12H [facility]


   Last Admin: 05/10/21 09:15 Dose:  5,000 units


   Documented by: 


Ceftriaxone Sodium 1 gm/ (Sodium Chloride)  100 mls @ 200 mls/hr IV Q24H [facility]


   Last Admin: 05/10/21 09:10 Dose:  200 mls/hr


   Documented by: 


Azithromycin 500 mg/ Sodium (Chloride)  250 mls @ 250 mls/hr IV Q24H [facility]


   Last Admin: 05/10/21 09:42 Dose:  250 mls/hr


   Documented by: 


Potassium Chloride 20 meq/ (Dextrose/Water)  1,010 mls @ 75 mls/hr IV Q13H [facility]


   Last Admin: 05/10/21 02:20 Dose:  75 mls/hr


   Documented by: 


Ondansetron HCl (Ondansetron 4 Mg/2 Ml Sdv)  4 mg IVPUSH Q8H PRN


   PRN Reason: Nausea


Oxycodone/Acetaminophen (Acetaminophen/Oxycodone 325-5 Mg Tab)  1 tab PO Q4H PRN


   PRN Reason: Pain (moderate 4-6)


Pantoprazole Sodium (Pantoprazole 40 Mg Tab.Cr)  40 mg PO ACBREAKFAST [facility]


   Last Admin: 05/10/21 06:54 Dose:  40 mg


   Documented by: 


Polyethylene Glycol (Polyethylene Glycol 3350 Powder 17 Gm Packet)  17 gm PO 

DAILY [facility]


   Last Admin: 05/10/21 09:15 Dose:  17 gm


   Documented by: 


Saccharomyces Boulardii (Saccharomyces Boulardii (Probiotic) 250 Mg Cap)  250 mg

 PO BID [facility]


   Last Admin: 05/10/21 09:15 Dose:  250 mg


   Documented by: 


Senna/Docusate Sodium (Docusate Sodium/Sennosides 50-8.6 Mg Tab)  1 tab PO BID 

[facility]


   Last Admin: 05/10/21 09:15 Dose:  1 tab


   Documented by: 


Sodium Chloride (Sodium Chloride 0.9% 10 Ml Syringe)  10 ml FLUSH ONETIME PRN


   PRN Reason: Keep Vein Open


   Last Admin: 05/07/21 03:41 Dose:  10 ml


   Documented by: 





Discontinued Medications





Sodium Chloride (Normal Saline)  500 mls @ 1,000 mls/hr IV .BOLUS ONE


   Stop: 05/07/21 03:05


   Last Admin: 05/07/21 02:40 Dose:  1,000 mls/hr


   Documented by: 


Sodium Chloride (Normal Saline)  1,000 mls @ 999 mls/hr IV ONETIME ONE


   Stop: 05/07/21 04:09


   Last Admin: 05/07/21 03:56 Dose:  999 mls/hr


   Documented by: 


Levofloxacin/Dextrose 750 mg/ (Premix)  150 mls @ 100 mls/hr IV ONETIME STA


   Stop: 05/07/21 04:49


   Last Admin: 05/07/21 03:47 Dose:  100 mls/hr


   Documented by: 


Sodium Chloride (Normal Saline)  45 mls @ 40 mls/hr IV ASDIRECTED [facility]


   Last Admin: 05/07/21 03:41 Dose:  40 mls/hr


   Documented by: 


Sodium Chloride (Normal Saline)  1,000 mls @ 500 mls/hr IV ONETIME ONE


   Stop: 05/07/21 06:27


   Last Admin: 05/07/21 05:31 Dose:  500 mls/hr


   Documented by: 


Norepinephrine Bitartrate 4 mg (/ Dextrose/Water)  250 mls @ 7.5 mls/hr IV 

TITRATE ANA; Protocol


Dextrose/Water (Dextrose 5% In Water)  1,000 mls @ 75 mls/hr IV ASDIRECTED ANA


   Last Admin: 05/07/21 09:59 Dose:  75 mls/hr


   Documented by: 


Dextrose/Water (Dextrose 5% In Water)  1,000 mls @ 100 mls/hr IV ASDIRECTED ANA


   Last Admin: 05/07/21 21:08 Dose:  100 mls/hr


   Documented by: 


Potassium Chloride 20 meq/ (Dextrose/Water)  1,010 mls @ 75.75 mls/hr IV 

ASDIRECTED ANA


Iopamidol (Iopamidol 755 Mg/Ml 100 Ml Bottle)  100 ml IVPUSH ONETIME ONE


   Stop: 05/07/21 03:40


   Last Admin: 05/07/21 03:41 Dose:  100 ml


   Documented by: 











- Exam


Physical Findings Comments:: 





General: No Acute Distress


HEENT: Pupils Equal, Pupils Reactive


Neck: No JVD


Lungs: Normal Respiratory Effort


Cardiovascular: Regular Rate, Regular Rhythm


GI/Abdominal Exam: Normal Bowel Sounds, Soft, No Organomegaly


Extremities: Normal Inspection, No Pedal Edema


Skin: Warm, Dry, Intact


Neurological: Other (Unable to complete because the patient does not answer any 

questions)


Psy/Mental Status: Other


Physical Findings Comments: Unable to complete because patient does not answer 

any questions